# Patient Record
Sex: FEMALE | Race: WHITE | Employment: OTHER | URBAN - METROPOLITAN AREA
[De-identification: names, ages, dates, MRNs, and addresses within clinical notes are randomized per-mention and may not be internally consistent; named-entity substitution may affect disease eponyms.]

---

## 2021-05-28 ENCOUNTER — APPOINTMENT (OUTPATIENT)
Dept: CT IMAGING | Age: 66
DRG: 481 | End: 2021-05-28
Attending: PHYSICIAN ASSISTANT
Payer: MEDICARE

## 2021-05-28 ENCOUNTER — APPOINTMENT (OUTPATIENT)
Dept: GENERAL RADIOLOGY | Age: 66
DRG: 481 | End: 2021-05-28
Attending: PHYSICIAN ASSISTANT
Payer: MEDICARE

## 2021-05-28 ENCOUNTER — APPOINTMENT (OUTPATIENT)
Dept: ULTRASOUND IMAGING | Age: 66
DRG: 481 | End: 2021-05-28
Attending: INTERNAL MEDICINE
Payer: MEDICARE

## 2021-05-28 ENCOUNTER — APPOINTMENT (OUTPATIENT)
Dept: GENERAL RADIOLOGY | Age: 66
DRG: 481 | End: 2021-05-28
Attending: EMERGENCY MEDICINE
Payer: MEDICARE

## 2021-05-28 ENCOUNTER — HOSPITAL ENCOUNTER (INPATIENT)
Age: 66
LOS: 8 days | Discharge: HOME HEALTH | DRG: 481 | End: 2021-06-05
Attending: EMERGENCY MEDICINE | Admitting: INTERNAL MEDICINE
Payer: MEDICARE

## 2021-05-28 DIAGNOSIS — I48.0 PAF (PAROXYSMAL ATRIAL FIBRILLATION) (HCC): ICD-10-CM

## 2021-05-28 DIAGNOSIS — I10 ESSENTIAL HYPERTENSION: ICD-10-CM

## 2021-05-28 DIAGNOSIS — S42.201A CLOSED FRACTURE OF PROXIMAL END OF RIGHT HUMERUS, UNSPECIFIED FRACTURE MORPHOLOGY, INITIAL ENCOUNTER: ICD-10-CM

## 2021-05-28 DIAGNOSIS — S72.409S CLOSED FRACTURE OF DISTAL END OF FEMUR, UNSPECIFIED FRACTURE MORPHOLOGY, SEQUELA: Primary | ICD-10-CM

## 2021-05-28 PROBLEM — G40.909 SEIZURE DISORDER (HCC): Status: ACTIVE | Noted: 2021-05-28

## 2021-05-28 PROBLEM — R79.89 ELEVATED LFTS: Status: ACTIVE | Noted: 2021-05-28

## 2021-05-28 PROBLEM — S42.309A HUMERUS FRACTURE: Status: ACTIVE | Noted: 2021-05-28

## 2021-05-28 PROBLEM — E78.5 HLD (HYPERLIPIDEMIA): Status: ACTIVE | Noted: 2021-05-28

## 2021-05-28 LAB
ALBUMIN SERPL-MCNC: 3.6 G/DL (ref 3.5–5)
ALBUMIN/GLOB SERPL: 0.9 {RATIO} (ref 1.1–2.2)
ALP SERPL-CCNC: 284 U/L (ref 45–117)
ALT SERPL-CCNC: 88 U/L (ref 12–78)
ANION GAP SERPL CALC-SCNC: 5 MMOL/L (ref 5–15)
AST SERPL-CCNC: 70 U/L (ref 15–37)
BASOPHILS # BLD: 0.1 K/UL (ref 0–0.1)
BASOPHILS NFR BLD: 1 % (ref 0–1)
BILIRUB SERPL-MCNC: 0.3 MG/DL (ref 0.2–1)
BUN SERPL-MCNC: 9 MG/DL (ref 6–20)
BUN/CREAT SERPL: 12 (ref 12–20)
CALCIUM SERPL-MCNC: 8.6 MG/DL (ref 8.5–10.1)
CHLORIDE SERPL-SCNC: 100 MMOL/L (ref 97–108)
CO2 SERPL-SCNC: 28 MMOL/L (ref 21–32)
CREAT SERPL-MCNC: 0.75 MG/DL (ref 0.55–1.02)
DIFFERENTIAL METHOD BLD: ABNORMAL
EOSINOPHIL # BLD: 0.1 K/UL (ref 0–0.4)
EOSINOPHIL NFR BLD: 1 % (ref 0–7)
ERYTHROCYTE [DISTWIDTH] IN BLOOD BY AUTOMATED COUNT: 12 % (ref 11.5–14.5)
GLOBULIN SER CALC-MCNC: 3.9 G/DL (ref 2–4)
GLUCOSE SERPL-MCNC: 140 MG/DL (ref 65–100)
HCT VFR BLD AUTO: 32 % (ref 35–47)
HGB BLD-MCNC: 10.5 G/DL (ref 11.5–16)
IMM GRANULOCYTES # BLD AUTO: 0.1 K/UL (ref 0–0.04)
IMM GRANULOCYTES NFR BLD AUTO: 1 % (ref 0–0.5)
LYMPHOCYTES # BLD: 1 K/UL (ref 0.8–3.5)
LYMPHOCYTES NFR BLD: 10 % (ref 12–49)
MCH RBC QN AUTO: 31.7 PG (ref 26–34)
MCHC RBC AUTO-ENTMCNC: 32.8 G/DL (ref 30–36.5)
MCV RBC AUTO: 96.7 FL (ref 80–99)
MONOCYTES # BLD: 0.4 K/UL (ref 0–1)
MONOCYTES NFR BLD: 4 % (ref 5–13)
NEUTS SEG # BLD: 9 K/UL (ref 1.8–8)
NEUTS SEG NFR BLD: 83 % (ref 32–75)
NRBC # BLD: 0 K/UL (ref 0–0.01)
NRBC BLD-RTO: 0 PER 100 WBC
PLATELET # BLD AUTO: 289 K/UL (ref 150–400)
PMV BLD AUTO: 9.5 FL (ref 8.9–12.9)
POTASSIUM SERPL-SCNC: 3.2 MMOL/L (ref 3.5–5.1)
PROT SERPL-MCNC: 7.5 G/DL (ref 6.4–8.2)
RBC # BLD AUTO: 3.31 M/UL (ref 3.8–5.2)
SODIUM SERPL-SCNC: 133 MMOL/L (ref 136–145)
WBC # BLD AUTO: 10.7 K/UL (ref 3.6–11)

## 2021-05-28 PROCEDURE — 73060 X-RAY EXAM OF HUMERUS: CPT

## 2021-05-28 PROCEDURE — 76705 ECHO EXAM OF ABDOMEN: CPT

## 2021-05-28 PROCEDURE — 99285 EMERGENCY DEPT VISIT HI MDM: CPT

## 2021-05-28 PROCEDURE — 73706 CT ANGIO LWR EXTR W/O&W/DYE: CPT

## 2021-05-28 PROCEDURE — 80053 COMPREHEN METABOLIC PANEL: CPT

## 2021-05-28 PROCEDURE — 96374 THER/PROPH/DIAG INJ IV PUSH: CPT

## 2021-05-28 PROCEDURE — 80074 ACUTE HEPATITIS PANEL: CPT

## 2021-05-28 PROCEDURE — 74011250636 HC RX REV CODE- 250/636: Performed by: INTERNAL MEDICINE

## 2021-05-28 PROCEDURE — 85025 COMPLETE CBC W/AUTO DIFF WBC: CPT

## 2021-05-28 PROCEDURE — 73552 X-RAY EXAM OF FEMUR 2/>: CPT

## 2021-05-28 PROCEDURE — 74011250636 HC RX REV CODE- 250/636: Performed by: EMERGENCY MEDICINE

## 2021-05-28 PROCEDURE — 87635 SARS-COV-2 COVID-19 AMP PRB: CPT

## 2021-05-28 PROCEDURE — 86923 COMPATIBILITY TEST ELECTRIC: CPT

## 2021-05-28 PROCEDURE — 73562 X-RAY EXAM OF KNEE 3: CPT

## 2021-05-28 PROCEDURE — 73030 X-RAY EXAM OF SHOULDER: CPT

## 2021-05-28 PROCEDURE — 74011000636 HC RX REV CODE- 636: Performed by: EMERGENCY MEDICINE

## 2021-05-28 PROCEDURE — P9045 ALBUMIN (HUMAN), 5%, 250 ML: HCPCS

## 2021-05-28 PROCEDURE — 74011250637 HC RX REV CODE- 250/637: Performed by: INTERNAL MEDICINE

## 2021-05-28 PROCEDURE — 74011000250 HC RX REV CODE- 250: Performed by: INTERNAL MEDICINE

## 2021-05-28 PROCEDURE — 86901 BLOOD TYPING SEROLOGIC RH(D): CPT

## 2021-05-28 PROCEDURE — 71045 X-RAY EXAM CHEST 1 VIEW: CPT

## 2021-05-28 PROCEDURE — 72170 X-RAY EXAM OF PELVIS: CPT

## 2021-05-28 PROCEDURE — 93005 ELECTROCARDIOGRAM TRACING: CPT

## 2021-05-28 PROCEDURE — 94640 AIRWAY INHALATION TREATMENT: CPT

## 2021-05-28 PROCEDURE — 74011250636 HC RX REV CODE- 250/636: Performed by: PHYSICIAN ASSISTANT

## 2021-05-28 PROCEDURE — 65270000029 HC RM PRIVATE

## 2021-05-28 PROCEDURE — 36415 COLL VENOUS BLD VENIPUNCTURE: CPT

## 2021-05-28 PROCEDURE — 74011250636 HC RX REV CODE- 250/636

## 2021-05-28 RX ORDER — HEPARIN SODIUM 5000 [USP'U]/ML
5000 INJECTION, SOLUTION INTRAVENOUS; SUBCUTANEOUS ONCE
Status: COMPLETED | OUTPATIENT
Start: 2021-05-28 | End: 2021-05-28

## 2021-05-28 RX ORDER — DICLOFENAC SODIUM 75 MG/1
75 TABLET, DELAYED RELEASE ORAL 2 TIMES DAILY
COMMUNITY
End: 2021-06-05

## 2021-05-28 RX ORDER — DEXTROMETHORPHAN HYDROBROMIDE, GUAIFENESIN 5; 100 MG/5ML; MG/5ML
1300 LIQUID ORAL 2 TIMES DAILY
COMMUNITY
End: 2021-11-15

## 2021-05-28 RX ORDER — AMLODIPINE BESYLATE 5 MG/1
5 TABLET ORAL EVERY EVENING
Status: DISCONTINUED | OUTPATIENT
Start: 2021-05-28 | End: 2021-06-01

## 2021-05-28 RX ORDER — SODIUM CHLORIDE 0.9 % (FLUSH) 0.9 %
5-40 SYRINGE (ML) INJECTION EVERY 8 HOURS
Status: DISCONTINUED | OUTPATIENT
Start: 2021-05-28 | End: 2021-06-05 | Stop reason: HOSPADM

## 2021-05-28 RX ORDER — OXYCODONE HYDROCHLORIDE 5 MG/1
5 TABLET ORAL
Status: DISCONTINUED | OUTPATIENT
Start: 2021-05-28 | End: 2021-05-31 | Stop reason: SDUPTHER

## 2021-05-28 RX ORDER — ALBUTEROL SULFATE 90 UG/1
2 AEROSOL, METERED RESPIRATORY (INHALATION)
COMMUNITY

## 2021-05-28 RX ORDER — PROMETHAZINE HYDROCHLORIDE 25 MG/1
12.5 TABLET ORAL
Status: DISCONTINUED | OUTPATIENT
Start: 2021-05-28 | End: 2021-06-05 | Stop reason: HOSPADM

## 2021-05-28 RX ORDER — LISINOPRIL AND HYDROCHLOROTHIAZIDE 12.5; 2 MG/1; MG/1
2 TABLET ORAL EVERY EVENING
COMMUNITY
End: 2021-06-05

## 2021-05-28 RX ORDER — PHENOBARBITAL 32.4 MG/1
129.6 TABLET ORAL
Status: DISCONTINUED | OUTPATIENT
Start: 2021-05-28 | End: 2021-06-05 | Stop reason: HOSPADM

## 2021-05-28 RX ORDER — POTASSIUM CHLORIDE 750 MG/1
40 TABLET, FILM COATED, EXTENDED RELEASE ORAL
Status: ACTIVE | OUTPATIENT
Start: 2021-05-28 | End: 2021-05-29

## 2021-05-28 RX ORDER — HYDROMORPHONE HYDROCHLORIDE 1 MG/ML
1 INJECTION, SOLUTION INTRAMUSCULAR; INTRAVENOUS; SUBCUTANEOUS
Status: DISCONTINUED | OUTPATIENT
Start: 2021-05-28 | End: 2021-05-31

## 2021-05-28 RX ORDER — SODIUM CHLORIDE AND POTASSIUM CHLORIDE .9; .15 G/100ML; G/100ML
SOLUTION INTRAVENOUS CONTINUOUS
Status: DISCONTINUED | OUTPATIENT
Start: 2021-05-28 | End: 2021-05-31

## 2021-05-28 RX ORDER — FLUTICASONE PROPIONATE AND SALMETEROL 250; 50 UG/1; UG/1
1 POWDER RESPIRATORY (INHALATION) EVERY 12 HOURS
COMMUNITY

## 2021-05-28 RX ORDER — POLYETHYLENE GLYCOL 3350 17 G/17G
17 POWDER, FOR SOLUTION ORAL DAILY PRN
Status: DISCONTINUED | OUTPATIENT
Start: 2021-05-28 | End: 2021-06-05 | Stop reason: HOSPADM

## 2021-05-28 RX ORDER — ENOXAPARIN SODIUM 100 MG/ML
40 INJECTION SUBCUTANEOUS DAILY
Status: DISCONTINUED | OUTPATIENT
Start: 2021-05-30 | End: 2021-06-05 | Stop reason: HOSPADM

## 2021-05-28 RX ORDER — ALBUTEROL SULFATE 0.83 MG/ML
2.5 SOLUTION RESPIRATORY (INHALATION)
Status: DISCONTINUED | OUTPATIENT
Start: 2021-05-28 | End: 2021-06-05 | Stop reason: HOSPADM

## 2021-05-28 RX ORDER — MORPHINE SULFATE 2 MG/ML
2 INJECTION, SOLUTION INTRAMUSCULAR; INTRAVENOUS
Status: COMPLETED | OUTPATIENT
Start: 2021-05-28 | End: 2021-05-28

## 2021-05-28 RX ORDER — AMLODIPINE BESYLATE 5 MG/1
5 TABLET ORAL EVERY EVENING
COMMUNITY
End: 2021-06-05

## 2021-05-28 RX ORDER — SODIUM CHLORIDE 0.9 % (FLUSH) 0.9 %
5-40 SYRINGE (ML) INJECTION AS NEEDED
Status: DISCONTINUED | OUTPATIENT
Start: 2021-05-28 | End: 2021-06-05 | Stop reason: HOSPADM

## 2021-05-28 RX ORDER — PHENOBARBITAL 32.4 MG/1
129.6 TABLET ORAL
COMMUNITY

## 2021-05-28 RX ORDER — ACETAMINOPHEN 650 MG/1
650 SUPPOSITORY RECTAL
Status: DISCONTINUED | OUTPATIENT
Start: 2021-05-28 | End: 2021-06-05 | Stop reason: HOSPADM

## 2021-05-28 RX ORDER — LISINOPRIL 20 MG/1
40 TABLET ORAL EVERY EVENING
Status: DISCONTINUED | OUTPATIENT
Start: 2021-05-28 | End: 2021-06-02

## 2021-05-28 RX ORDER — ACETAMINOPHEN 325 MG/1
650 TABLET ORAL
Status: DISCONTINUED | OUTPATIENT
Start: 2021-05-28 | End: 2021-06-05 | Stop reason: HOSPADM

## 2021-05-28 RX ORDER — ONDANSETRON 2 MG/ML
4 INJECTION INTRAMUSCULAR; INTRAVENOUS
Status: DISCONTINUED | OUTPATIENT
Start: 2021-05-28 | End: 2021-06-05 | Stop reason: HOSPADM

## 2021-05-28 RX ADMIN — PHENOBARBITAL 129.6 MG: 32.4 TABLET ORAL at 22:25

## 2021-05-28 RX ADMIN — POTASSIUM CHLORIDE AND SODIUM CHLORIDE: 900; 150 INJECTION, SOLUTION INTRAVENOUS at 20:56

## 2021-05-28 RX ADMIN — OXYCODONE 5 MG: 5 TABLET ORAL at 22:28

## 2021-05-28 RX ADMIN — MORPHINE SULFATE 2 MG: 2 INJECTION, SOLUTION INTRAMUSCULAR; INTRAVENOUS at 13:53

## 2021-05-28 RX ADMIN — ARFORMOTEROL TARTRATE: 15 SOLUTION RESPIRATORY (INHALATION) at 21:19

## 2021-05-28 RX ADMIN — HEPARIN SODIUM 5000 UNITS: 5000 INJECTION INTRAVENOUS; SUBCUTANEOUS at 18:20

## 2021-05-28 RX ADMIN — IOPAMIDOL 100 ML: 755 INJECTION, SOLUTION INTRAVENOUS at 16:11

## 2021-05-28 RX ADMIN — Medication 10 ML: at 22:25

## 2021-05-28 NOTE — ED PROVIDER NOTES
The history is provided by the patient. Fall  The accident occurred less than 1 hour ago. The fall occurred while walking. She fell from a height of ground level. She landed on dirt. There was no blood loss. The point of impact was the right shoulder. The pain is present in the right shoulder. The pain is moderate. She was not ambulatory at the scene. There was no entrapment after the fall. There was no drug use involved in the accident. There was no alcohol use involved in the accident. Pertinent negatives include no visual change, no fever, no numbness, no abdominal pain, no bowel incontinence, no nausea, no vomiting, no hematuria, no headaches, no extremity weakness, no hearing loss, no loss of consciousness, no tingling and no laceration. The risk factors include being elderly. The symptoms are aggravated by activity, use of injured limb, ambulation and pressure on injury. She has tried nothing for the symptoms. The treatment provided no relief. It is unknown when the patient last had a tetanus shot. No past medical history on file. No past surgical history on file. No family history on file. Social History     Socioeconomic History    Marital status: Not on file     Spouse name: Not on file    Number of children: Not on file    Years of education: Not on file    Highest education level: Not on file   Occupational History    Not on file   Tobacco Use    Smoking status: Not on file   Substance and Sexual Activity    Alcohol use: Not on file    Drug use: Not on file    Sexual activity: Not on file   Other Topics Concern    Not on file   Social History Narrative    Not on file     Social Determinants of Health     Financial Resource Strain:     Difficulty of Paying Living Expenses:    Food Insecurity:     Worried About Running Out of Food in the Last Year:     [address] in the Last Year:    Transportation Needs:     Lack of Transportation (Medical):      Lack of Transportation (Non-Medical):    Physical Activity:     Days of Exercise per Week:     Minutes of Exercise per Session:    Stress:     Feeling of Stress :    Social Connections:     Frequency of Communication with Friends and Family:     Frequency of Social Gatherings with Friends and Family:     Attends Sikh Services:     Active Member of Clubs or Organizations:     Attends Club or Organization Meetings:     Marital Status:    Intimate Partner Violence:     Fear of Current or Ex-Partner:     Emotionally Abused:     Physically Abused:     Sexually Abused: ALLERGIES: Seasonale [levonorgestrel-ethinyl estrad]    Review of Systems   Constitutional: Negative for activity change, chills and fever. HENT: Negative for nosebleeds, sore throat, trouble swallowing and voice change. Eyes: Negative for visual disturbance. Respiratory: Negative for shortness of breath. Cardiovascular: Negative for chest pain and palpitations. Gastrointestinal: Negative for abdominal pain, bowel incontinence, constipation, diarrhea, nausea and vomiting. Genitourinary: Negative for difficulty urinating, dysuria, hematuria and urgency. Musculoskeletal: Positive for arthralgias. Negative for back pain, extremity weakness, neck pain and neck stiffness. Skin: Negative for color change. Allergic/Immunologic: Negative for immunocompromised state. Neurological: Negative for dizziness, tingling, seizures, loss of consciousness, syncope, weakness, light-headedness, numbness and headaches. Psychiatric/Behavioral: Negative for behavioral problems, confusion, hallucinations, self-injury and suicidal ideas. Vitals:    05/28/21 1312   BP: (!) 175/52   Pulse: 70   Resp: 16   Temp: 98.1 °F (36.7 °C)   SpO2: 98%   Weight: 90.7 kg (200 lb)   Height: 5' 3\" (1.6 m)            Physical Exam  Vitals and nursing note reviewed. Constitutional:       General: She is not in acute distress.      Appearance: She is well-developed. She is not diaphoretic. HENT:      Head: Normocephalic and atraumatic. Eyes:      Pupils: Pupils are equal, round, and reactive to light. Cardiovascular:      Rate and Rhythm: Normal rate and regular rhythm. Heart sounds: Normal heart sounds. No murmur heard. No friction rub. No gallop. Pulmonary:      Effort: Pulmonary effort is normal. No respiratory distress. Breath sounds: Normal breath sounds. No wheezing. Abdominal:      General: Bowel sounds are normal. There is no distension. Palpations: Abdomen is soft. Tenderness: There is no abdominal tenderness. There is no guarding or rebound. Musculoskeletal:      Right shoulder: Bony tenderness present. Decreased range of motion. Right upper arm: Tenderness present. Right elbow: Normal.      Cervical back: Normal range of motion and neck supple. Right upper leg: Bony tenderness present. Right knee: Decreased range of motion. Skin:     General: Skin is warm. Findings: No laceration or rash. Neurological:      Mental Status: She is alert and oriented to person, place, and time. Psychiatric:         Behavior: Behavior normal.         Thought Content: Thought content normal.         Judgment: Judgment normal.          MDM     This is a 58-year-old female with past medical history, review of systems, physical exam as above, presenting with complaints of right arm pain, right leg pain following a ground-level fall. Patient states she was stepped outside onto an uneven sidewalk causing her to roll her right ankle and fall to her right side. She endorses immediate onset of right thigh pain, right arm pain. She endorse striking her nose but denies loss of consciousness or bleeding. She denies nose pain.   Physical exam remarkable for a well-appearing elderly female, in no acute distress with decreased range of motion and tenderness involving the right shoulder and right humerus, distal pulse motor and sensation intact, right elbow and right wrist without apparent pathology. Tenderness and decreased range of motion of the right upper leg, with normal tib-fib and nontender right ankle with free range of motion. Differential includes contusion versus fracture. Concerning for humerus fracture versus femur fracture following fall. Plan to provide pain control, and obtain plain films, preadmission lab work. We will reassess, and make a disposition. Procedures    SIGN OUT:  3:07 PM  Discussed pt's hx, disposition, and available diagnostic and imaging results with Dr. Bhanu Max. Reviewed care plans. Both providers and patient are in agreement with care plan. Dr. Delvis West is transferring care of the pt to Dr. Bhanu Max at this time.

## 2021-05-28 NOTE — PROGRESS NOTES
[facility]: MED RECONCILIATION    Information obtained from: patient, Rx query    Significant PMH/Disease States:   Past Medical History:   Diagnosis Date    Hyperlipidemia     Hypertension     Seizures ([address]      Chief Complaint for this Admission:   Chief Complaint   Patient presents with    Fall     Allergies: Seasonale [levonorgestrel-ethinyl estrad]    Prior to Admission Medications:     Medication Documentation Review Audit       Reviewed by MARLINE CamachoD (Pharmacist) on 05/28/21 at 1821      Medication Sig Documenting Provider Last Dose Status Taking?   acetaminophen (Tylenol Arthritis Pain) 650 mg TbER Take 1,300 mg by mouth two (2) times a day. Takes with diclofenac Provider, Historical 5/28/2021 am Active Yes   albuterol (PROVENTIL HFA, VENTOLIN HFA, PROAIR HFA) 90 mcg/actuation inhaler Take 2 Puffs by inhalation every four (4) hours as needed for Wheezing. Provider, Historical 5/28/2021 am Active Yes   amLODIPine (NORVASC) 5 mg tablet Take 5 mg by mouth every evening. Provider, Historical 5/27/2021 pm Active Yes   diclofenac EC (VOLTAREN) 75 mg EC tablet Take 75 mg by mouth two (2) times a day. Takes with Tylenol Arthritis Provider, Historical 5/28/2021 am Active Yes   fluticasone propion-salmeteroL (Wixela Inhub) 250-50 mcg/dose diskus inhaler Take 1 Puff by inhalation every twelve (12) hours. Provider, Historical 5/28/2021 am Active Yes   lisinopril-hydroCHLOROthiazide (PRINZIDE, ZESTORETIC) 20-12.5 mg per tablet Take 2 Tablets by mouth every evening. Provider, Historical 5/27/2021 pm Active Yes   PHENobarbitaL (LUMINAL) 32.4 mg tablet Take 129.6 mg by mouth nightly. 129.6 mg = 4 tablets Provider, Historical 5/27/2021 pm Active Yes                  Thank you,  Juaquin BHAKTA [facility]

## 2021-05-28 NOTE — ED NOTES
3:30 PM  I received signout from Dr. Jennifer Church for Ms. Grande. Pt. With a humerus fx and femur fx. I re-examined her. 2+ DP pulse on the right. Normal sensation. Cap refill less than 2 seconds. Will order CT, per ortho recs. Pt.  To be evaluated for admission by the hospitalist

## 2021-05-28 NOTE — ED TRIAGE NOTES
Pt to ED via ems from home for GLF onto grass. Pt reports mis stepping and fell to the ground, denies LOC, does not take blood thinners  Reports right upper arm pain and right knee pain.  +PMS

## 2021-05-28 NOTE — ED NOTES
TRANSFER - OUT REPORT:    Verbal report given to [facility] RN(name) on Krishna Oneal  being transferred to 413(unit) for routine progression of care       Report consisted of patients Situation, Background, Assessment and   Recommendations(SBAR). Information from the following report(s) SBAR, Kardex, ED Summary and MAR was reviewed with the receiving nurse. Lines:   Peripheral IV 05/28/21 Left Antecubital (Active)        Opportunity for questions and clarification was provided.       Patient transported with:   Monitor  Registered Nurse

## 2021-05-28 NOTE — ED NOTES
Perfect Serve Consult for Admission  3:33 PM    ED Room Number: ER07/07  Patient Name and age:  Michaela Morataya 76 y.o.  female  Working Diagnosis:   1. Closed fracture of distal end of femur, unspecified fracture morphology, sequela    2. Closed fracture of proximal end of right humerus, unspecified fracture morphology, initial encounter        COVID-19 Suspicion:  no  Sepsis present:  no  Reassessment needed: no  Readmission: no  Isolation Requirements:  no  Recommended Level of Care:  med/surg  Department:[facility] - [phone]  Other:  Ortho to see.

## 2021-05-28 NOTE — H&P
[address]  [address] [facility], [address] [place] [address]  [phone]    Admission History and Physical      NAME:  Malika Grimes   :   1955   MRN:  [id number]     PCP:  Caroline Luna MD     Date/Time of service:  2021  6:24 PM        Subjective:     CHIEF COMPLAINT: Fall    HISTORY OF PRESENT ILLNESS:     Ms. Martin Aldridge is a 76 y.o.  female the past medical history of hypertension, hyperlipidemia and seizure disorder who is admitted with a humeral and femoral fracture. Ms. Martin Aldridge states that she was walking today on level ground when she fell. She denies any prior chest pain or syncopal episode; she states she recollects the entire episode. She states she landed on her right side hitting her right shoulder and lower extremity. Allergies   Allergen Reactions    Seasonale [Levonorgestrel-Ethinyl Estrad] Runny Nose       Prior to Admission medications    Medication Sig Start Date End Date Taking? Authorizing Provider   albuterol (PROVENTIL HFA, VENTOLIN HFA, PROAIR HFA) 90 mcg/actuation inhaler Take 2 Puffs by inhalation every four (4) hours as needed for Wheezing. Yes Provider, Historical   amLODIPine (NORVASC) 5 mg tablet Take 5 mg by mouth every evening. Yes Provider, Historical   diclofenac EC (VOLTAREN) 75 mg EC tablet Take 75 mg by mouth two (2) times a day. Takes with Tylenol Arthritis   Yes Provider, Historical   fluticasone propion-salmeteroL (Wixela Inhub) 250-50 mcg/dose diskus inhaler Take 1 Puff by inhalation every twelve (12) hours. Yes Provider, Historical   lisinopril-hydroCHLOROthiazide (PRINZIDE, ZESTORETIC) 20-12.5 mg per tablet Take 2 Tablets by mouth every evening. Yes Provider, Historical   PHENobarbitaL (LUMINAL) 32.4 mg tablet Take 129.6 mg by mouth nightly. 129.6 mg = 4 tablets   Yes Provider, Historical   acetaminophen (Tylenol Arthritis Pain) 650 mg TbER Take 1,300 mg by mouth two (2) times a day.  Takes with diclofenac   Yes Provider, Historical       Past Medical History:   Diagnosis Date    Hyperlipidemia     Hypertension     Seizures ([address]         Past Surgical History:   Procedure Laterality Date    HX GYN      HX ORTHOPAEDIC         Social History     Tobacco Use    Smoking status: Never Smoker   Substance Use Topics    Alcohol use: Never        Family History   Problem Relation Age of Onset    Heart Disease Mother     Heart Disease Father         Review of Systems:  (bold if positive, if negative)    Gen:  Eyes:  ENT:  CVS:  Pulm:  GI:  :  MS: Pain of right upper and lower extremitySkin:  Psych:  Endo:  Hem:  Renal:  Neuro:            Objective:      VITALS:    Vital signs reviewed; most recent are:    Visit Vitals  BP (!) 136/39   Pulse 70   Temp 98.1 °F (36.7 °C)   Resp 16   Ht 5' 3\" (1.6 m)   Wt 90.7 kg (200 lb)   SpO2 97%   BMI 35.43 kg/m²     SpO2 Readings from Last 6 Encounters:   05/28/21 97%        No intake or output data in the 24 hours ending 05/28/21 1824     Exam:     Physical Exam:    Gen:  Well-developed, well-nourished, in no acute distress  HEENT:  Pink conjunctivae, PERRL, hearing intact to voice  Resp:  No accessory muscle use, clear breath sounds without wheezes rales or rhonchi  Card:  RRR, No murmurs, normal S1, S2, bilateral peripheral edema  Abd:  Soft, non-tender, non-distended, normoactive bowel sounds are present, no palpable organomegaly   Lymph:  No cervical adenopathy  Musc:  No cyanosis or clubbing  Skin:  No rashes or ulcers, skin turgor is good  Neuro:  Cranial nerves 3-12 are grossly intact, limited range of motion of right upper lower extremity, follows commands appropriately  Psych:  Oriented to person, place, and time, Alert with good insight      Labs:    Recent Labs     05/28/21  1354   WBC 10.7   HGB 10.5*   HCT 32.0*        Recent Labs     05/28/21  1354   *   K 3.2*      CO2 28   *   BUN 9   CREA 0.75   CA 8.6   ALB 3.6   TBILI 0.3   ALT 88*     No results found for: GLUCPOC  No results for input(s): PH, PCO2, PO2, HCO3, FIO2 in the last 72 hours. No results for input(s): INR, INREXT in the last 72 hours. Radiology and EKG reviewed:   CT with right femoral fracture   Old Records reviewed in [facility]       Assessment/Plan:           Right Humerus fracture (5/28/2021)/ right femoral fracture: Orthopedics consult. Acute pain of right humerus and femur:Oxycodone and IV Dilaudid for pain as needed. Elevated LFTs: obtain ABD US and acute hepatitis panel. Monitor     Hypokalemia: Replete as needed. Hold home HCTZ. Seizure disorder ([address] (5/28/2021): Continue home phenobarbital.      HTN (hypertension) (5/28/2021): Continue home amlodipine. Continue home losartan. HLD (hyperlipidemia) (5/28/2021): Not on home statin.          Risk of deterioration: high      Total time spent with patient: 79 Minutes **I personally saw and examined the patient during this time period**                 Care Plan discussed with: Patient, Family and Consultant/Specialist    Discussed:  Care Plan    Prophylaxis:  Hep SQ    Probable Disposition:   PT, OT, RN VERSUS REHAB           ___________________________________________________    Attending Physician: Yamileth Mcgee DO

## 2021-05-29 ENCOUNTER — APPOINTMENT (OUTPATIENT)
Dept: GENERAL RADIOLOGY | Age: 66
DRG: 481 | End: 2021-05-29
Attending: INTERNAL MEDICINE
Payer: MEDICARE

## 2021-05-29 ENCOUNTER — ANESTHESIA (OUTPATIENT)
Dept: SURGERY | Age: 66
DRG: 481 | End: 2021-05-29
Payer: MEDICARE

## 2021-05-29 ENCOUNTER — ANESTHESIA EVENT (OUTPATIENT)
Dept: SURGERY | Age: 66
DRG: 481 | End: 2021-05-29
Payer: MEDICARE

## 2021-05-29 LAB
ALBUMIN SERPL-MCNC: 3.4 G/DL (ref 3.5–5)
ALBUMIN/GLOB SERPL: 1 {RATIO} (ref 1.1–2.2)
ALP SERPL-CCNC: 260 U/L (ref 45–117)
ALT SERPL-CCNC: 68 U/L (ref 12–78)
ANION GAP SERPL CALC-SCNC: 7 MMOL/L (ref 5–15)
AST SERPL-CCNC: 36 U/L (ref 15–37)
ATRIAL RATE: 76 BPM
BASOPHILS # BLD: 0 K/UL (ref 0–0.1)
BASOPHILS NFR BLD: 0 % (ref 0–1)
BILIRUB SERPL-MCNC: 0.4 MG/DL (ref 0.2–1)
BUN SERPL-MCNC: 10 MG/DL (ref 6–20)
BUN/CREAT SERPL: 17 (ref 12–20)
CALCIUM SERPL-MCNC: 8.5 MG/DL (ref 8.5–10.1)
CALCULATED P AXIS, ECG09: 48 DEGREES
CALCULATED R AXIS, ECG10: 22 DEGREES
CALCULATED T AXIS, ECG11: 66 DEGREES
CHLORIDE SERPL-SCNC: 102 MMOL/L (ref 97–108)
CO2 SERPL-SCNC: 25 MMOL/L (ref 21–32)
COVID-19 RAPID TEST, COVR: NOT DETECTED
CREAT SERPL-MCNC: 0.59 MG/DL (ref 0.55–1.02)
DIAGNOSIS, 93000: NORMAL
DIFFERENTIAL METHOD BLD: ABNORMAL
EOSINOPHIL # BLD: 0 K/UL (ref 0–0.4)
EOSINOPHIL NFR BLD: 0 % (ref 0–7)
ERYTHROCYTE [DISTWIDTH] IN BLOOD BY AUTOMATED COUNT: 12 % (ref 11.5–14.5)
GLOBULIN SER CALC-MCNC: 3.5 G/DL (ref 2–4)
GLUCOSE SERPL-MCNC: 132 MG/DL (ref 65–100)
HAV IGM SER QL: NONREACTIVE
HBV CORE IGM SER QL: NONREACTIVE
HBV SURFACE AG SER QL: <0.1 INDEX
HBV SURFACE AG SER QL: NEGATIVE
HCT VFR BLD AUTO: 28 % (ref 35–47)
HCV AB SERPL QL IA: NONREACTIVE
HCV COMMENT,HCGAC: NORMAL
HGB BLD-MCNC: 9.2 G/DL (ref 11.5–16)
IMM GRANULOCYTES # BLD AUTO: 0 K/UL (ref 0–0.04)
IMM GRANULOCYTES NFR BLD AUTO: 0 % (ref 0–0.5)
LYMPHOCYTES # BLD: 0.9 K/UL (ref 0.8–3.5)
LYMPHOCYTES NFR BLD: 9 % (ref 12–49)
MAGNESIUM SERPL-MCNC: 2.1 MG/DL (ref 1.6–2.4)
MCH RBC QN AUTO: 31.4 PG (ref 26–34)
MCHC RBC AUTO-ENTMCNC: 32.9 G/DL (ref 30–36.5)
MCV RBC AUTO: 95.6 FL (ref 80–99)
MONOCYTES # BLD: 0.5 K/UL (ref 0–1)
MONOCYTES NFR BLD: 5 % (ref 5–13)
NEUTS SEG # BLD: 8.7 K/UL (ref 1.8–8)
NEUTS SEG NFR BLD: 86 % (ref 32–75)
NRBC # BLD: 0 K/UL (ref 0–0.01)
NRBC BLD-RTO: 0 PER 100 WBC
P-R INTERVAL, ECG05: 156 MS
PLATELET # BLD AUTO: 297 K/UL (ref 150–400)
PMV BLD AUTO: 9.9 FL (ref 8.9–12.9)
POTASSIUM SERPL-SCNC: 3.7 MMOL/L (ref 3.5–5.1)
PROT SERPL-MCNC: 6.9 G/DL (ref 6.4–8.2)
Q-T INTERVAL, ECG07: 434 MS
QRS DURATION, ECG06: 82 MS
QTC CALCULATION (BEZET), ECG08: 488 MS
RBC # BLD AUTO: 2.93 M/UL (ref 3.8–5.2)
SODIUM SERPL-SCNC: 134 MMOL/L (ref 136–145)
SOURCE, COVRS: NORMAL
SP1: NORMAL
SP2: NORMAL
SP3: NORMAL
VENTRICULAR RATE, ECG03: 76 BPM
WBC # BLD AUTO: 10.1 K/UL (ref 3.6–11)

## 2021-05-29 PROCEDURE — 77030031139 HC SUT VCRL2 J&J -A: Performed by: ORTHOPAEDIC SURGERY

## 2021-05-29 PROCEDURE — 74011250636 HC RX REV CODE- 250/636: Performed by: ORTHOPAEDIC SURGERY

## 2021-05-29 PROCEDURE — 77030020061 HC IV BLD WRMR ADMIN SET 3M -B: Performed by: ANESTHESIOLOGY

## 2021-05-29 PROCEDURE — 77030003862 HC BIT DRL SYNT -B: Performed by: ORTHOPAEDIC SURGERY

## 2021-05-29 PROCEDURE — 76010000132 HC OR TIME 2.5 TO 3 HR: Performed by: ORTHOPAEDIC SURGERY

## 2021-05-29 PROCEDURE — 74011250636 HC RX REV CODE- 250/636: Performed by: NURSE ANESTHETIST, CERTIFIED REGISTERED

## 2021-05-29 PROCEDURE — 74011250636 HC RX REV CODE- 250/636: Performed by: ANESTHESIOLOGY

## 2021-05-29 PROCEDURE — C1713 ANCHOR/SCREW BN/BN,TIS/BN: HCPCS | Performed by: ORTHOPAEDIC SURGERY

## 2021-05-29 PROCEDURE — C1769 GUIDE WIRE: HCPCS | Performed by: ORTHOPAEDIC SURGERY

## 2021-05-29 PROCEDURE — 74011250637 HC RX REV CODE- 250/637: Performed by: INTERNAL MEDICINE

## 2021-05-29 PROCEDURE — 77030000032 HC CUF TRNQT ZIMM -B: Performed by: ORTHOPAEDIC SURGERY

## 2021-05-29 PROCEDURE — 77030002933 HC SUT MCRYL J&J -A: Performed by: ORTHOPAEDIC SURGERY

## 2021-05-29 PROCEDURE — 77030020788: Performed by: ORTHOPAEDIC SURGERY

## 2021-05-29 PROCEDURE — 74011000250 HC RX REV CODE- 250: Performed by: INTERNAL MEDICINE

## 2021-05-29 PROCEDURE — 2709999900 HC NON-CHARGEABLE SUPPLY: Performed by: ORTHOPAEDIC SURGERY

## 2021-05-29 PROCEDURE — 74011000258 HC RX REV CODE- 258: Performed by: NURSE ANESTHETIST, CERTIFIED REGISTERED

## 2021-05-29 PROCEDURE — 77030026352 HC BIT DRL QC PERC SYNT -C: Performed by: ORTHOPAEDIC SURGERY

## 2021-05-29 PROCEDURE — 85025 COMPLETE CBC W/AUTO DIFF WBC: CPT

## 2021-05-29 PROCEDURE — 77030013079 HC BLNKT BAIR HGGR 3M -A: Performed by: ANESTHESIOLOGY

## 2021-05-29 PROCEDURE — 74011250636 HC RX REV CODE- 250/636: Performed by: INTERNAL MEDICINE

## 2021-05-29 PROCEDURE — 3E0T3BZ INTRODUCTION OF ANESTHETIC AGENT INTO PERIPHERAL NERVES AND PLEXI, PERCUTANEOUS APPROACH: ICD-10-PCS | Performed by: ANESTHESIOLOGY

## 2021-05-29 PROCEDURE — 80053 COMPREHEN METABOLIC PANEL: CPT

## 2021-05-29 PROCEDURE — 94640 AIRWAY INHALATION TREATMENT: CPT

## 2021-05-29 PROCEDURE — 77030010507 HC ADH SKN DERMBND J&J -B: Performed by: ORTHOPAEDIC SURGERY

## 2021-05-29 PROCEDURE — 65270000029 HC RM PRIVATE

## 2021-05-29 PROCEDURE — 36415 COLL VENOUS BLD VENIPUNCTURE: CPT

## 2021-05-29 PROCEDURE — 83735 ASSAY OF MAGNESIUM: CPT

## 2021-05-29 PROCEDURE — 65660000000 HC RM CCU STEPDOWN

## 2021-05-29 PROCEDURE — 77030033067 HC SUT PDO STRATFX SPIR J&J -B: Performed by: ORTHOPAEDIC SURGERY

## 2021-05-29 PROCEDURE — 77030026438 HC STYL ET INTUB CARD -A: Performed by: ANESTHESIOLOGY

## 2021-05-29 PROCEDURE — 76210000006 HC OR PH I REC 0.5 TO 1 HR: Performed by: ORTHOPAEDIC SURGERY

## 2021-05-29 PROCEDURE — 0QSB04Z REPOSITION RIGHT LOWER FEMUR WITH INTERNAL FIXATION DEVICE, OPEN APPROACH: ICD-10-PCS | Performed by: PHYSICIAN ASSISTANT

## 2021-05-29 PROCEDURE — 77030008684 HC TU ET CUF COVD -B: Performed by: ANESTHESIOLOGY

## 2021-05-29 PROCEDURE — 74011000250 HC RX REV CODE- 250: Performed by: NURSE ANESTHETIST, CERTIFIED REGISTERED

## 2021-05-29 PROCEDURE — 76060000036 HC ANESTHESIA 2.5 TO 3 HR: Performed by: ORTHOPAEDIC SURGERY

## 2021-05-29 DEVICE — IMPLANTABLE DEVICE
Type: IMPLANTABLE DEVICE | Site: FEMUR | Status: NON-FUNCTIONAL
Removed: 2021-11-29

## 2021-05-29 DEVICE — SCREW BNE L32MM DIA4.5MM PROX CORT TIB S STL ST LOK FULL
Type: IMPLANTABLE DEVICE | Site: FEMUR | Status: NON-FUNCTIONAL
Removed: 2021-11-29

## 2021-05-29 DEVICE — SCREW BNE L55MM DIA5MM CNDYL S STL ST VAR ANG LOK FULL THRD
Type: IMPLANTABLE DEVICE | Site: FEMUR | Status: NON-FUNCTIONAL
Removed: 2021-11-29

## 2021-05-29 DEVICE — SCREW BNE L36MM DIA4.5MM PROX CORT TIB S STL ST LOK FULL
Type: IMPLANTABLE DEVICE | Site: FEMUR | Status: NON-FUNCTIONAL
Removed: 2021-11-29

## 2021-05-29 DEVICE — SCREW BNE L34MM DIA4.5MM PROX CORT TIB S STL ST LOK FULL
Type: IMPLANTABLE DEVICE | Site: FEMUR | Status: NON-FUNCTIONAL
Removed: 2021-11-29

## 2021-05-29 DEVICE — PLATE BNE L230MM 10 H NONSTERILE R CNDYL S STL CRV LOK
Type: IMPLANTABLE DEVICE | Site: FEMUR | Status: NON-FUNCTIONAL
Removed: 2021-11-29

## 2021-05-29 DEVICE — SCREW BNE L70MM DIA5MM CNDYL S STL ST VAR ANG LOK FULL THRD
Type: IMPLANTABLE DEVICE | Site: FEMUR | Status: NON-FUNCTIONAL
Removed: 2021-11-29

## 2021-05-29 RX ORDER — DIPHENHYDRAMINE HYDROCHLORIDE 50 MG/ML
12.5 INJECTION, SOLUTION INTRAMUSCULAR; INTRAVENOUS AS NEEDED
Status: DISCONTINUED | OUTPATIENT
Start: 2021-05-29 | End: 2021-05-29 | Stop reason: HOSPADM

## 2021-05-29 RX ORDER — SODIUM CHLORIDE, SODIUM LACTATE, POTASSIUM CHLORIDE, CALCIUM CHLORIDE 600; 310; 30; 20 MG/100ML; MG/100ML; MG/100ML; MG/100ML
125 INJECTION, SOLUTION INTRAVENOUS CONTINUOUS
Status: DISCONTINUED | OUTPATIENT
Start: 2021-05-29 | End: 2021-05-30

## 2021-05-29 RX ORDER — GLYCOPYRROLATE 0.2 MG/ML
INJECTION INTRAMUSCULAR; INTRAVENOUS AS NEEDED
Status: DISCONTINUED | OUTPATIENT
Start: 2021-05-29 | End: 2021-05-29 | Stop reason: HOSPADM

## 2021-05-29 RX ORDER — NEOSTIGMINE METHYLSULFATE 1 MG/ML
INJECTION, SOLUTION INTRAVENOUS AS NEEDED
Status: DISCONTINUED | OUTPATIENT
Start: 2021-05-29 | End: 2021-05-29 | Stop reason: HOSPADM

## 2021-05-29 RX ORDER — SODIUM CHLORIDE 9 MG/ML
125 INJECTION, SOLUTION INTRAVENOUS CONTINUOUS
Status: DISCONTINUED | OUTPATIENT
Start: 2021-05-29 | End: 2021-05-30

## 2021-05-29 RX ORDER — ROCURONIUM BROMIDE 10 MG/ML
INJECTION, SOLUTION INTRAVENOUS AS NEEDED
Status: DISCONTINUED | OUTPATIENT
Start: 2021-05-29 | End: 2021-05-29 | Stop reason: HOSPADM

## 2021-05-29 RX ORDER — HYDROMORPHONE HYDROCHLORIDE 1 MG/ML
.25-1 INJECTION, SOLUTION INTRAMUSCULAR; INTRAVENOUS; SUBCUTANEOUS
Status: DISCONTINUED | OUTPATIENT
Start: 2021-05-29 | End: 2021-05-29 | Stop reason: HOSPADM

## 2021-05-29 RX ORDER — SODIUM CHLORIDE, SODIUM LACTATE, POTASSIUM CHLORIDE, CALCIUM CHLORIDE 600; 310; 30; 20 MG/100ML; MG/100ML; MG/100ML; MG/100ML
INJECTION, SOLUTION INTRAVENOUS
Status: DISCONTINUED | OUTPATIENT
Start: 2021-05-29 | End: 2021-05-29 | Stop reason: HOSPADM

## 2021-05-29 RX ORDER — HYDROMORPHONE HYDROCHLORIDE 2 MG/ML
INJECTION, SOLUTION INTRAMUSCULAR; INTRAVENOUS; SUBCUTANEOUS AS NEEDED
Status: DISCONTINUED | OUTPATIENT
Start: 2021-05-29 | End: 2021-05-29 | Stop reason: HOSPADM

## 2021-05-29 RX ORDER — FENTANYL CITRATE 50 UG/ML
INJECTION, SOLUTION INTRAMUSCULAR; INTRAVENOUS AS NEEDED
Status: DISCONTINUED | OUTPATIENT
Start: 2021-05-29 | End: 2021-05-29 | Stop reason: HOSPADM

## 2021-05-29 RX ORDER — CEFAZOLIN SODIUM 1 G/3ML
INJECTION, POWDER, FOR SOLUTION INTRAMUSCULAR; INTRAVENOUS AS NEEDED
Status: DISCONTINUED | OUTPATIENT
Start: 2021-05-29 | End: 2021-05-29 | Stop reason: HOSPADM

## 2021-05-29 RX ORDER — EPHEDRINE SULFATE/0.9% NACL/PF 50 MG/5 ML
SYRINGE (ML) INTRAVENOUS AS NEEDED
Status: DISCONTINUED | OUTPATIENT
Start: 2021-05-29 | End: 2021-05-29 | Stop reason: HOSPADM

## 2021-05-29 RX ORDER — ROPIVACAINE HYDROCHLORIDE 5 MG/ML
INJECTION, SOLUTION EPIDURAL; INFILTRATION; PERINEURAL AS NEEDED
Status: DISCONTINUED | OUTPATIENT
Start: 2021-05-29 | End: 2021-05-29 | Stop reason: HOSPADM

## 2021-05-29 RX ORDER — LIDOCAINE HYDROCHLORIDE 20 MG/ML
INJECTION, SOLUTION EPIDURAL; INFILTRATION; INTRACAUDAL; PERINEURAL AS NEEDED
Status: DISCONTINUED | OUTPATIENT
Start: 2021-05-29 | End: 2021-05-29 | Stop reason: HOSPADM

## 2021-05-29 RX ORDER — PROPOFOL 10 MG/ML
INJECTION, EMULSION INTRAVENOUS AS NEEDED
Status: DISCONTINUED | OUTPATIENT
Start: 2021-05-29 | End: 2021-05-29 | Stop reason: HOSPADM

## 2021-05-29 RX ORDER — DEXAMETHASONE SODIUM PHOSPHATE 4 MG/ML
INJECTION, SOLUTION INTRA-ARTICULAR; INTRALESIONAL; INTRAMUSCULAR; INTRAVENOUS; SOFT TISSUE AS NEEDED
Status: DISCONTINUED | OUTPATIENT
Start: 2021-05-29 | End: 2021-05-29 | Stop reason: HOSPADM

## 2021-05-29 RX ORDER — PHENYLEPHRINE HCL IN 0.9% NACL 0.4MG/10ML
SYRINGE (ML) INTRAVENOUS AS NEEDED
Status: DISCONTINUED | OUTPATIENT
Start: 2021-05-29 | End: 2021-05-29 | Stop reason: HOSPADM

## 2021-05-29 RX ORDER — SUCCINYLCHOLINE CHLORIDE 20 MG/ML
INJECTION INTRAMUSCULAR; INTRAVENOUS AS NEEDED
Status: DISCONTINUED | OUTPATIENT
Start: 2021-05-29 | End: 2021-05-29 | Stop reason: HOSPADM

## 2021-05-29 RX ORDER — MIDAZOLAM HYDROCHLORIDE 1 MG/ML
INJECTION, SOLUTION INTRAMUSCULAR; INTRAVENOUS AS NEEDED
Status: DISCONTINUED | OUTPATIENT
Start: 2021-05-29 | End: 2021-05-29 | Stop reason: HOSPADM

## 2021-05-29 RX ORDER — ALBUMIN HUMAN 50 G/1000ML
SOLUTION INTRAVENOUS
Status: DISCONTINUED | OUTPATIENT
Start: 2021-05-29 | End: 2021-05-29 | Stop reason: HOSPADM

## 2021-05-29 RX ORDER — PROPOFOL 10 MG/ML
INJECTION, EMULSION INTRAVENOUS
Status: DISCONTINUED | OUTPATIENT
Start: 2021-05-29 | End: 2021-05-29 | Stop reason: HOSPADM

## 2021-05-29 RX ADMIN — ROCURONIUM BROMIDE 20 MG: 10 INJECTION INTRAVENOUS at 16:31

## 2021-05-29 RX ADMIN — POTASSIUM CHLORIDE AND SODIUM CHLORIDE: 900; 150 INJECTION, SOLUTION INTRAVENOUS at 12:32

## 2021-05-29 RX ADMIN — MIDAZOLAM HYDROCHLORIDE 2 MG: 2 INJECTION, SOLUTION INTRAMUSCULAR; INTRAVENOUS at 15:24

## 2021-05-29 RX ADMIN — ROCURONIUM BROMIDE 10 MG: 10 INJECTION INTRAVENOUS at 16:10

## 2021-05-29 RX ADMIN — ALBUMIN HUMAN 500 ML/HR: 50 SOLUTION INTRAVENOUS at 17:17

## 2021-05-29 RX ADMIN — DEXAMETHASONE SODIUM PHOSPHATE 4 MG: 4 INJECTION, SOLUTION INTRAMUSCULAR; INTRAVENOUS at 16:19

## 2021-05-29 RX ADMIN — SUCCINYLCHOLINE CHLORIDE 140 MG: 20 INJECTION, SOLUTION INTRAMUSCULAR; INTRAVENOUS at 16:11

## 2021-05-29 RX ADMIN — PROPOFOL 50 MCG/KG/MIN: 10 INJECTION, EMULSION INTRAVENOUS at 16:20

## 2021-05-29 RX ADMIN — HYDROMORPHONE HYDROCHLORIDE 0.5 MG: 2 INJECTION INTRAMUSCULAR; INTRAVENOUS; SUBCUTANEOUS at 16:05

## 2021-05-29 RX ADMIN — Medication 10 MG: at 16:25

## 2021-05-29 RX ADMIN — GLYCOPYRROLATE 0.4 MG: 0.2 INJECTION INTRAMUSCULAR; INTRAVENOUS at 18:15

## 2021-05-29 RX ADMIN — Medication 10 MG: at 16:50

## 2021-05-29 RX ADMIN — FENTANYL CITRATE 50 MCG: 0.05 INJECTION, SOLUTION INTRAMUSCULAR; INTRAVENOUS at 15:24

## 2021-05-29 RX ADMIN — SODIUM CHLORIDE, POTASSIUM CHLORIDE, SODIUM LACTATE AND CALCIUM CHLORIDE: 600; 310; 30; 20 INJECTION, SOLUTION INTRAVENOUS at 16:00

## 2021-05-29 RX ADMIN — Medication 10 ML: at 12:31

## 2021-05-29 RX ADMIN — Medication 3 MG: at 18:15

## 2021-05-29 RX ADMIN — Medication 100 MCG: at 17:16

## 2021-05-29 RX ADMIN — SODIUM CHLORIDE, POTASSIUM CHLORIDE, SODIUM LACTATE AND CALCIUM CHLORIDE: 600; 310; 30; 20 INJECTION, SOLUTION INTRAVENOUS at 16:15

## 2021-05-29 RX ADMIN — TRANEXAMIC ACID 1 G: 100 INJECTION, SOLUTION INTRAVENOUS at 16:25

## 2021-05-29 RX ADMIN — LIDOCAINE HYDROCHLORIDE 100 MG: 20 INJECTION, SOLUTION EPIDURAL; INFILTRATION; INTRACAUDAL; PERINEURAL at 16:10

## 2021-05-29 RX ADMIN — Medication 200 MCG: at 17:28

## 2021-05-29 RX ADMIN — MIDAZOLAM HYDROCHLORIDE 2 MG: 2 INJECTION, SOLUTION INTRAMUSCULAR; INTRAVENOUS at 16:00

## 2021-05-29 RX ADMIN — Medication 10 ML: at 06:05

## 2021-05-29 RX ADMIN — PHENOBARBITAL 129.6 MG: 32.4 TABLET ORAL at 21:39

## 2021-05-29 RX ADMIN — Medication 100 MCG: at 17:41

## 2021-05-29 RX ADMIN — ARFORMOTEROL TARTRATE: 15 SOLUTION RESPIRATORY (INHALATION) at 08:39

## 2021-05-29 RX ADMIN — PROPOFOL 150 MG: 10 INJECTION, EMULSION INTRAVENOUS at 16:10

## 2021-05-29 RX ADMIN — SODIUM CHLORIDE 125 ML/HR: 9 INJECTION, SOLUTION INTRAVENOUS at 20:02

## 2021-05-29 RX ADMIN — ROPIVACAINE HYDROCHLORIDE 30 ML: 5 INJECTION, SOLUTION EPIDURAL; INFILTRATION; PERINEURAL at 15:33

## 2021-05-29 RX ADMIN — FENTANYL CITRATE 50 MCG: 0.05 INJECTION, SOLUTION INTRAMUSCULAR; INTRAVENOUS at 16:00

## 2021-05-29 RX ADMIN — ARFORMOTEROL TARTRATE: 15 SOLUTION RESPIRATORY (INHALATION) at 22:01

## 2021-05-29 RX ADMIN — Medication 100 MCG: at 16:47

## 2021-05-29 RX ADMIN — TRANEXAMIC ACID 1 G: 100 INJECTION, SOLUTION INTRAVENOUS at 18:05

## 2021-05-29 RX ADMIN — Medication 10 ML: at 21:39

## 2021-05-29 RX ADMIN — OXYCODONE 5 MG: 5 TABLET ORAL at 06:05

## 2021-05-29 RX ADMIN — CEFAZOLIN SODIUM 2 G: 1 POWDER, FOR SOLUTION INTRAMUSCULAR; INTRAVENOUS at 16:25

## 2021-05-29 NOTE — OP NOTES
OPERATIVE REPORT    Admit Date: 5/28/2021  Admit Diagnosis: Right Distal Femur Fracture  Date of Procedure: 5/29/2021   Preoperative Diagnosis: RIGHT DISTAL FEMUR FRACTURE  Postoperative Diagnosis: RIGHT DISTAL FEMUR FRACTURE    Procedure: Procedure(s):  RIGHT DISTAL FEMUR OPEN REDUCTION INTERNAL FIXATION  Surgeon: Charlene Lizama MD  Assistant(s): none  Anesthesia: General   Estimated Blood Loss: 600cc  Specimens: * No specimens in log *   Complications: None      INDICATIONS:  Karlie Delgado is a patient with a distal femur fracture with severe comminution and was indicated for surgery for a fracture around an implant which was stable. We discussed risks, alternatives and expectations prior to surgery. The patient was seen for medical clearance. PROCEDURE PERFORMED :   The patient was seen in the pre-operative holding area and the proper limb initialized. Questions were answered and the patient was taken to the operating room for anesthesia. They were positioned in the supine position with a bump under the hip on the OR table and the operative extremity was prepped and draped in a sterile fashion. The appropriate antibiotics were given and time-out was performed prior to the incision. Using a lateral based incision from the distal joint line and along the shaft of the femur the skin was incised. The Iliotibial band was identified and splint longitudinally along with the incision toward the knee exposing the fracture and implant. The vastus lateralus was incised and bleeders coagulated. A reduction was obtained with reduction clamps and the appropriate plate [id number] was applied to the lateral cortex of the femur and centered on the lateral view over the knee and shaft of the femur. The plate was fixed proximally and distally with guide-wires and the reduction again verified under fluoroscopy.      A proximal non-locking cortical screw was placed to reduced the plate to bone and then a separate screw just distal to the fracture was placed. Once the reduction had been obtained the distal locking screws were placed sequentially. The final reduction was verified under fluoroscopic imaging. Cerclage wires were passed to secure the reduction. The plate was brought down to bone and the cables were sequentially tightened. The wound was copiously irrigated with normal saline and closed in layers with 0-Vicryl, #5 Strata-fix, 2-0 Vicryl and Monocryl. A sterile dressing and splint were applied the patient then the patient recovered from anesthesia and was taken to the post-operative holding area in a stable condition. IMPLANTS :   Implant Name Type Inv.  Item Serial No.  Lot No. LRB No. Used Action   PLATE BNE [id number] 10 H NONSTERILE R CNDYL S STL CRV VIRAL - SN/A  PLATE BNE [id number] 10 H NONSTERILE R CNDYL S STL CRV VIRAL N/A DEPU[employer] [place] N/A Right 1 Implanted   SCREW BNE L80MM DIA5MM CNDYL S STL ST MARCIN ANG EUGENIO VIRAL FULL - SN/A  SCREW BNE L80MM DIA5MM CNDYL S STL ST MARCIN ANG EUGENIO VIRAL FULL N/A [employer] [place] N/A Right 3 Implanted   SCREW BNE L35MM DIA5MM CNDYL S STL ST MARCIN ANG EUGENIO VIRAL FULL - SN/A  SCREW BNE L35MM DIA5MM CNDYL S STL ST MARCIN ANG EUGENIO VIRAL FULL N/A [employer] [place] N/A Right 1 Implanted   SCREW BNE L70MM DIA5MM CNDYL S STL ST MARCIN ANG VIRAL FULL THRD - SN/A  SCREW BNE L70MM DIA5MM CNDYL S STL ST MARCIN ANG VIRAL FULL THRD N/A [employer] [place] N/A Right 1 Implanted   SCREW BNE L55MM DIA5MM CNDYL S STL ST MARCIN ANG VIRAL FULL THRD - SN/A  SCREW BNE L55MM DIA5MM CNDYL S STL ST MARCIN ANG VIRAL FULL THRD N/A [employer] [place] N/A Right 1 Implanted   SCR BNE CRTX ST 4.5X36MM SS --  - SN/A  SCR BNE CRTX ST 4.5X36MM SS --  N/A SYN[employer] [place] N/A Right 2 Implanted   SCR BNE CRTX ST 4.5X32MM SS --  - SN/A  SCR BNE CRTX ST 4.5X32MM SS --  N/A SYN[employer] [place] N/A Right 3 Implanted   SCR BNE CRTX ST 4.5X34MM SS --  - SN/A  SCR BNE CRTX ST 4.5X34MM SS --  N/A SYNTHES [place] N/A Right 2 Implanted       POST OPERATIVE CONSIDERATIONS :   TTWB w/ a cane on the left.  No walker due to a right proximal humerus fracture    Danie Arteaga MD  Pager [phone]

## 2021-05-29 NOTE — ANESTHESIA PROCEDURE NOTES
Peripheral Block    Start time: 5/29/2021 3:24 PM  End time: 5/29/2021 3:37 PM  Performed by: Giorgi Hope MD  Authorized by: Giorgi Hope MD       Pre-procedure:    Indications: post-op pain management    Preanesthetic Checklist: patient identified, risks and benefits discussed, site marked, timeout performed, anesthesia consent given and patient being monitored      Block Type:   Block Type:  Femoral single shot  Laterality:  Right  Monitoring:  Continuous pulse ox, frequent vital sign checks, heart rate and responsive to questions  Injection Technique:  Single shot  Procedures: ultrasound guided and nerve stimulator    Patient Position: supine  Prep: chlorhexidine    Needle Type:  Stimuplex  Needle Gauge:  21 G  Needle Localization:  Nerve stimulator and ultrasound guidance  Motor Response comment:   Motor Response: minimal motor response >0.4 mA     Assessment:  Number of attempts:  1  Injection Assessment:  Incremental injection every 5 mL, no paresthesia and no intravascular symptoms  Patient tolerance:  Patient tolerated the procedure well with no immediate complications

## 2021-05-29 NOTE — PROGRESS NOTES
SUBJECTIVE:     Dalia Vaughn is a 76 y.o. female  evaluated for a right distal femur fracture and right proximal . The patient does not have dementia and they are able to verbally converse during the exam. Their mechanism of injury was GLF - tripped while descending stairs. The patient localizes their pain to right shoulder and right knee. Intensity is noted to be severe with movement. This injury occured yesterday. Other concerns include previous ankle fx ORIF.     Past Medical History :   Past Medical History:   Diagnosis Date    Hyperlipidemia     Hypertension     Seizures ([facility] [address]        Past Surgical History :   Past Surgical History:   Procedure Laterality Date    HX GYN      HX ORTHOPAEDIC          Medications :  See med reconciliation    Allergies :   Seasonale [levonorgestrel-ethinyl estrad]     Social History :  Social History     Socioeconomic History    Marital status:      Spouse name: Not on file    Number of children: Not on file    Years of education: Not on file    Highest education level: Not on file   Occupational History    Not on file   Tobacco Use    Smoking status: Never Smoker   Substance and Sexual Activity    Alcohol use: Never    Drug use: Never    Sexual activity: Not on file   Other Topics Concern     Service Not Asked    Blood Transfusions Not Asked    Caffeine Concern Not Asked    Occupational Exposure Not Asked    Hobby Hazards Not Asked    Sleep Concern Not Asked    Stress Concern Not Asked    Weight Concern Not Asked    Special Diet Not Asked    Back Care Not Asked    Exercise Not Asked    Bike Helmet Not Asked   [address] Not Asked    Self-Exams Not Asked   Social History Narrative    Not on file     Social Determinants of Health     Financial Resource Strain:     Difficulty of Paying Living Expenses:    Food Insecurity:     Worried About Running Out of Food in the Last Year:     Nicolle of Food in the Last Year:    Transportation Needs:     Lack of Transportation (Medical):  Lack of Transportation (Non-Medical):    Physical Activity:     Days of Exercise per Week:     Minutes of Exercise per Session:    Stress:     Feeling of Stress :    Social Connections:     Frequency of Communication with Friends and Family:     Frequency of Social Gatherings with Friends and Family:     Attends Temple Services:     Active Member of Clubs or Organizations:     Attends Club or Organization Meetings:     Marital Status:    Intimate Partner Violence:     Fear of Current or Ex-Partner:     Emotionally Abused:     Physically Abused:     Sexually Abused:        Review of Systems:  (bold if positive, if negative)    Gen:  Eyes:  ENT:  CVS:  Pulm:  GI:  :  MS:  Skin:  Psych:  Endo:  Hem:  Renal:  Neuro:         FAMILY HISTORY :  Family History   Problem Relation Age of Onset    Heart Disease Mother     Heart Disease Father        SOCIAL :  Occupation - Homemaker, 2 sons      Objective:     Vitals :  Patient Vitals for the past 12 hrs:   BP Temp Pulse Resp SpO2   05/29/21 1518 (!) 162/56 98.9 °F (37.2 °C) 82 17 95 %   05/29/21 1133 (!) 147/77 99 °F (37.2 °C) 84 16 94 %   05/29/21 0800 (!) 119/57 99.4 °F (37.4 °C) 78 16 95 %   05/29/21 0700   77         Eyes with conjugate movement, PEARLA  Alert and Oriented x 3, Dementia is not present. No Acute Respiratory Distress  Heart and Lung exam normal    Focused Physical Exam :   R, Shoulder - MS intact R/U/M, ROM deferred. Right knee w/ thigh - ROM deferred, intact DF / PF of the foot / ankle. Palpable distal pulses of the UE / LE. No Lymphadenopathy   Palpable pulses  No skin trophic changes    Labs :   Recent Labs     05/29/21  0254   HGB 9.2*   HCT 28.0*   *   K 3.7      CO2 25   BUN 10   CREA 0.59   *       X-rays :   Right shoulder - min displaced / impracted prox. Humerus fx. Right femur / CT - displaced distal 1/3 femur fracture.  No evidence of a vascular injury. ASSESSMENT :  Right shoulder non-displaced prox humerus fx and right supracondylar femur fracture. 1.   Therapy / Weight Bearing Recommendations following surgery : WBAT w/ a cane for the left. 2.    DVT Prophylaxis (Following surgery) : Mechanical and Lovenox  3. Anticipated Disposition : Home  4. Surgery Recommendations : ORIF distal femur fracture. Thank you for allowing to participate in this patients care. Please do not hesitate to contact my office or contact me with any questions or concerns. I will continue to follow the patient in the hospital and arrange for follow-up after the surgery.               Claudette Love MD  Cell [phone]  Ulysses Pearce PA-C  Cell [phone]  Medical Staff : Araceli Mayes  Office : [phone]

## 2021-05-29 NOTE — PROGRESS NOTES
[facility]  [address] [facility], [address] [place] [address]  [phone]      Medical Progress Note      NAME: Andres Gomez   :  1955  MRM:  [id number]    Date of service: 2021  6:59 AM       Assessment and Plan:   1. Right femoral fracture: evaluated by orthopedics and plan for ORIF. Pain management     2. Right Humerus fracture (2021). Non operative management for now. Sling. NWB RUE. Pain management     3. Transaminitis. Resolved. ABD US showed Cholelithiasis. No evidence of acute cholecystitis. acute hepatitis panel is negative. Monitor      4.   Seizure disorder ([address] (2021): Continue home phenobarbital.     5. HTN (hypertension) (2021): Continue home amlodipine, lisinopril.      6. HLD (hyperlipidemia) (2021): Not on home statin. Subjective:     Chief Complaint[de-identified] Patient was seen and examined as a follow up for humeral and femoral fracture. Chart was reviewed. feels well     ROS:  (bold if positive, if negative)    Tolerating PT  Tolerating Diet        Objective:     Last 24hrs VS reviewed since prior progress note.  Most recent are:    Visit Vitals  /72 (BP 1 Location: Left upper arm, BP Patient Position: At rest)   Pulse 82   Temp 98.4 °F (36.9 °C)   Resp 15   Ht 5' 3\" (1.6 m)   Wt 90.7 kg (200 lb)   SpO2 94%   BMI 35.43 kg/m²     SpO2 Readings from Last 6 Encounters:   21 94%        No intake or output data in the 24 hours ending 21 0659     Physical Exam:    Gen:  Well-developed, well-nourished, in no acute distress  HEENT:  Pink conjunctivae, PERRL, hearing intact to voice, moist mucous membranes  Neck:  Supple, without masses, thyroid non-tender  Resp:  No accessory muscle use, clear breath sounds without wheezes rales or rhonchi  Card:  No murmurs, normal S1, S2 without thrills, bruits or peripheral edema  Abd:  Soft, non-tender, non-distended, normoactive bowel sounds are present, no palpable organomegaly and no detectable hernias  Lymph:  No cervical or inguinal adenopathy  Musc:  No cyanosis or clubbing  Skin:  No rashes or ulcers, skin turgor is good  Neuro:  Cranial nerves are grossly intact, no focal motor weakness, follows commands appropriately  Psych:  Good insight, oriented to person, place and time, alert  __________________________________________________________________  Medications Reviewed: (see below)  Medications:     Current Facility-Administered Medications   Medication Dose Route Frequency    sodium chloride (NS) flush 5-40 mL  5-40 mL IntraVENous Q8H    sodium chloride (NS) flush 5-40 mL  5-40 mL IntraVENous PRN    acetaminophen (TYLENOL) tablet 650 mg  650 mg Oral Q6H PRN    Or    acetaminophen (TYLENOL) suppository 650 mg  650 mg Rectal Q6H PRN    polyethylene glycol (MIRALAX) packet 17 g  17 g Oral DAILY PRN    promethazine (PHENERGAN) tablet 12.5 mg  12.5 mg Oral Q6H PRN    Or    ondansetron (ZOFRAN) injection 4 mg  4 mg IntraVENous Q6H PRN    [START ON 5/30/2021] enoxaparin (LOVENOX) injection 40 mg  40 mg SubCUTAneous DAILY    0.9% sodium chloride with KCl 20 mEq/L infusion   IntraVENous CONTINUOUS    HYDROmorphone (DILAUDID) syringe 1 mg  1 mg IntraVENous Q6H PRN    oxyCODONE IR (ROXICODONE) tablet 5 mg  5 mg Oral Q6H PRN    amLODIPine (NORVASC) tablet 5 mg  5 mg Oral QPM    albuterol (PROVENTIL VENTOLIN) nebulizer solution 2.5 mg  2.5 mg Nebulization Q4H PRN    PHENobarbitaL (LUMINAL) tablet 129.6 mg  129.6 mg Oral QHS    lisinopriL (PRINIVIL, ZESTRIL) tablet 40 mg  40 mg Oral QPM    arformoterol 15 mcg/budesonide 0.5 mg neb solution   Nebulization BID RT        Lab Data Reviewed: (see below)  Lab Review:     Recent Labs     05/29/21  0254 05/28/21  1354   WBC 10.1 10.7   HGB 9.2* 10.5*   HCT 28.0* 32.0*    289     Recent Labs     05/29/21  0254 05/28/21  1354   * 133*   K 3.7 3.2*    100   CO2 25 28   * 140*   BUN 10 9   CREA 0.59 0.75   CA 8.5 8.6   MG 2.1  --    ALB 3.4* 3.6   TBILI 0.4 0.3   ALT 68 88*     No results found for: GLUCPOC  No results for input(s): PH, PCO2, PO2, HCO3, FIO2 in the last 72 hours. No results for input(s): INR, INREXT in the last 72 hours. All Micro Results     Procedure Component Value Units Date/Time    COVID-19 RAPID TEST [[id number]] Collected: 05/28/21 5002    Order Status: Completed Specimen: Nasopharyngeal Updated: 05/29/21 0043     Specimen source Nasopharyngeal        COVID-19 rapid test Not detected        Comment: Rapid Abbott ID Now       Rapid NAAT:  The specimen is NEGATIVE for SARS-CoV-2, the novel coronavirus associated with COVID-19. Negative results should be treated as presumptive and, if inconsistent with clinical signs and symptoms or necessary for patient management, should be tested with an alternative molecular assay. Negative results do not preclude SARS-CoV-2 infection and should not be used as the sole basis for patient management decisions. This test has been authorized by the FDA under an Emergency Use Authorization (EUA) for use by authorized laboratories. Fact sheet for Healthcare Providers: ConventionUpdate.co.nz  Fact sheet for Patients: ConventionUpdate.co.nz       Methodology: Isothermal Nucleic Acid Amplification               I have reviewed notes of prior 24hr. Other pertinent lab: Total time spent with patient: 28 I personally reviewed chart, notes, data and current medications in the medical record. I have personally examined and treated the patient at bedside during this period.                  Care Plan discussed with: Patient, Family, Nursing Staff and >50% of time spent in counseling and coordination of care    Discussed:  Care Plan    Prophylaxis:  Lovenox    Disposition:  SNF/LTC           ___________________________________________________    Attending Physician: Agnes Calles MD

## 2021-05-29 NOTE — ANESTHESIA POSTPROCEDURE EVALUATION
Procedure(s):  RIGHT DISTAL FEMUR OPEN REDUCTION INTERNAL FIXATION. general, regional    Anesthesia Post Evaluation      Multimodal analgesia: multimodal analgesia not used between 6 hours prior to anesthesia start to PACU discharge  Patient location during evaluation: PACU  Patient participation: complete - patient participated  Level of consciousness: awake  Pain management: adequate  Airway patency: patent  Anesthetic complications: no  Cardiovascular status: acceptable, blood pressure returned to baseline and hemodynamically stable  Respiratory status: acceptable  Hydration status: acceptable  Post anesthesia nausea and vomiting:  controlled      INITIAL Post-op Vital signs:   Vitals Value Taken Time   /41 05/29/21 1915   Temp 36.8 °C (98.2 °F) 05/29/21 1920   Pulse 77 05/29/21 1931   Resp 12 05/29/21 1931   SpO2 97 % 05/29/21 1931   Vitals shown include unvalidated device data.

## 2021-05-29 NOTE — PROGRESS NOTES
Problem: Falls - Risk of  Goal: *Absence of Falls  Description: Document Manav Leonard Fall Risk and appropriate interventions in the flowsheet. Outcome: Progressing Towards Goal  Note: Fall Risk Interventions:  Mobility Interventions: Patient to call before getting OOB, Strengthening exercises (ROM-active/passive), Utilize walker, cane, or other assistive device         Medication Interventions: Patient to call before getting OOB, Teach patient to arise slowly    Elimination Interventions: Call light in reach, Bed/chair exit alarm, Patient to call for help with toileting needs, Toilet paper/wipes in reach, Toileting schedule/hourly rounds    History of Falls Interventions: Bed/chair exit alarm, Utilize gait belt for transfer/ambulation         Problem: Pressure Injury - Risk of  Goal: *Prevention of pressure injury  Description: Document Elbert Scale and appropriate interventions in the flowsheet.   Outcome: Progressing Towards Goal  Note: Pressure Injury Interventions:  Sensory Interventions: Float heels, Minimize linen layers    Moisture Interventions: Absorbent underpads, Minimize layers    Activity Interventions: Increase time out of bed, PT/OT evaluation    Mobility Interventions: Float heels, HOB 30 degrees or less, PT/OT evaluation    Nutrition Interventions: Document food/fluid/supplement intake, Offer support with meals,snacks and hydration    Friction and Shear Interventions: HOB 30 degrees or less, Lift sheet, Minimize layers

## 2021-05-29 NOTE — CONSULTS
ORTHOPEDIC SURGERY CONSULT    Subjective:     Date of Consultation:  May 28, 2021    Referring Physician:  Dr. Kd Curtis is a 76 y.o. female who is being seen for a displaced and comminuted right distal femur fracture and a comminuted and minimally displaced right proximal humerus fracture. She has a past medical history of HLD, hypertension, and seizure disorder. She presented to the [facility] ER via EMS after suffering a ground level fall at home today. Per the patient and her  she was ambulating down some steps and then was walking near her house when she tripped over a 6 inch tall step in the yard. She fell landing awkwardly on her right side. She suffered the above fractures. We have been asked to manage the fractures. She had a right ankle ORIF done by Dr. José Miguel Manzo previously. She reports she ambulates daily without walker or cane. She did not have precipitating pain before the fall. She denies a history chest pain or syncopal episodes. She denies head trauma. Patient Active Problem List    Diagnosis Date Noted    Humerus fracture 05/28/2021    HTN (hypertension) 05/28/2021    HLD (hyperlipidemia) 05/28/2021    Seizure disorder ([address] 05/28/2021    Elevated LFTs 05/28/2021     Family History   Problem Relation Age of Onset    Heart Disease Mother     Heart Disease Father       Social History     Tobacco Use    Smoking status: Never Smoker   Substance Use Topics    Alcohol use: Never     Past Medical History:   Diagnosis Date    Hyperlipidemia     Hypertension     Seizures ([address]       Past Surgical History:   Procedure Laterality Date    HX GYN      HX ORTHOPAEDIC        Prior to Admission medications    Medication Sig Start Date End Date Taking? Authorizing Provider   albuterol (PROVENTIL HFA, VENTOLIN HFA, PROAIR HFA) 90 mcg/actuation inhaler Take 2 Puffs by inhalation every four (4) hours as needed for Wheezing.    Yes Provider, Historical   amLODIPine (NORVASC) 5 mg tablet Take 5 mg by mouth every evening. Yes Provider, Historical   diclofenac EC (VOLTAREN) 75 mg EC tablet Take 75 mg by mouth two (2) times a day. Takes with Tylenol Arthritis   Yes Provider, Historical   fluticasone propion-salmeteroL (Wixela Inhub) 250-50 mcg/dose diskus inhaler Take 1 Puff by inhalation every twelve (12) hours. Yes Provider, Historical   lisinopril-hydroCHLOROthiazide (PRINZIDE, ZESTORETIC) 20-12.5 mg per tablet Take 2 Tablets by mouth every evening. Yes Provider, Historical   PHENobarbitaL (LUMINAL) 32.4 mg tablet Take 129.6 mg by mouth nightly. 129.6 mg = 4 tablets   Yes Provider, Historical   acetaminophen (Tylenol Arthritis Pain) 650 mg TbER Take 1,300 mg by mouth two (2) times a day.  Takes with diclofenac   Yes Provider, Historical     Current Facility-Administered Medications   Medication Dose Route Frequency    sodium chloride (NS) flush 5-40 mL  5-40 mL IntraVENous Q8H    sodium chloride (NS) flush 5-40 mL  5-40 mL IntraVENous PRN    acetaminophen (TYLENOL) tablet 650 mg  650 mg Oral Q6H PRN    Or    acetaminophen (TYLENOL) suppository 650 mg  650 mg Rectal Q6H PRN    polyethylene glycol (MIRALAX) packet 17 g  17 g Oral DAILY PRN    promethazine (PHENERGAN) tablet 12.5 mg  12.5 mg Oral Q6H PRN    Or    ondansetron (ZOFRAN) injection 4 mg  4 mg IntraVENous Q6H PRN    [START ON 5/30/2021] enoxaparin (LOVENOX) injection 40 mg  40 mg SubCUTAneous DAILY    potassium chloride SR (KLOR-CON 10) tablet 40 mEq  40 mEq Oral NOW    0.9% sodium chloride with KCl 20 mEq/L infusion   IntraVENous CONTINUOUS    HYDROmorphone (DILAUDID) syringe 1 mg  1 mg IntraVENous Q6H PRN    oxyCODONE IR (ROXICODONE) tablet 5 mg  5 mg Oral Q6H PRN    amLODIPine (NORVASC) tablet 5 mg  5 mg Oral QPM    albuterol (PROVENTIL VENTOLIN) nebulizer solution 2.5 mg  2.5 mg Nebulization Q4H PRN    PHENobarbitaL (LUMINAL) tablet 129.6 mg  129.6 mg Oral QHS    lisinopriL (PRINIVIL, ZESTRIL) tablet 40 mg  40 mg Oral QPM    arformoterol 15 mcg/budesonide 0.5 mg neb solution   Nebulization BID RT     Allergies   Allergen Reactions    Seasonale [Levonorgestrel-Ethinyl Estrad] Runny Nose        Review of Systems:  Pertinent items are noted in HPI. Objective:     Patient Vitals for the past 8 hrs:   BP Temp Pulse Resp SpO2   21 2120     98 %   21 2018 (!) 141/59 98.3 °F (36.8 °C) 70 15 97 %   21 1845 (!) 140/33  69 14 100 %   21 1745 (!) 136/39    97 %   21 1715 (!) 132/37    96 %   21 1700 (!) 139/35    96 %   21 1645 (!) 152/47    98 %   21 1631 (!) 160/45    100 %   21 1545 (!) 157/43    97 %   21 1345 (!) 176/46    99 %     Temp (24hrs), Av.2 °F (36.8 °C), Min:98.1 °F (36.7 °C), Max:98.3 °F (36.8 °C)        EXAM: GEN: Well developed and nurtured in NAD  PSYCH:  AAO x 4  MUSC: Right leg with obvious deformity of the right distal femur. Skin is intact. There are no abrasions. Hip and knee ROM not tested due to fracture. DP and PT pulses present and palpable. BCR of all digits  Of the foot. SILT right LE. Right humerus: no abrasions. Deltoid contracts. +radial and ulnar pulses. ROM not tested due to fracture. SILT RUE. BCR of all digits. IMAGING:        FINDINGS:    There is no evidence for traumatic injury to the right lower extremity vessels. There is mild atherosclerosis of the right superficial femoral artery and  popliteal artery. There is three-vessel runoff to the right lower extremity. There is a comminuted distal right femur fracture with lateral rotation of the  distal fracture fragment and medial displacement. There is interarticular  extension. There is no evidence of an underlying bone lesion. There is  evidence of prior distal tibial and fibular ORIF. There are old right superior  and inferior pubic ramus fractures. There is a lipehemarthrosis.     IMPRESSION  1.  No evidence for vascular injury of the right lower extremity. 2. Comminuted intra-articular displaced rotated distal right femur fracture. No  underlying bone lesion. 3. Lipohemarthrosis.     Exam: Three views of the right shoulder and AP and lateral views of the right  humerus.     FINDINGS: There is an acute, comminuted, mildly displaced fracture of the right  humeral head. There is no dislocation. Osseous structures are diffusely  demineralized.     IMPRESSION  Acute, comminuted, mildly displaced fracture of the right humeral  head. Data Review   Recent Results (from the past 24 hour(s))   CBC WITH AUTOMATED DIFF    Collection Time: 05/28/21  1:54 PM   Result Value Ref Range    WBC 10.7 3.6 - 11.0 K/uL    RBC 3.31 (L) 3.80 - 5.20 M/uL    HGB 10.5 (L) 11.5 - 16.0 g/dL    HCT 32.0 (L) 35.0 - 47.0 %    MCV 96.7 80.0 - 99.0 FL    MCH 31.7 26.0 - 34.0 PG    MCHC 32.8 30.0 - 36.5 g/dL    RDW 12.0 11.5 - 14.5 %    PLATELET 777 775 - 556 K/uL    MPV 9.5 8.9 - 12.9 FL    NRBC 0.0 0  WBC    ABSOLUTE NRBC 0.00 0.00 - 0.01 K/uL    NEUTROPHILS 83 (H) 32 - 75 %    LYMPHOCYTES 10 (L) 12 - 49 %    MONOCYTES 4 (L) 5 - 13 %    EOSINOPHILS 1 0 - 7 %    BASOPHILS 1 0 - 1 %    IMMATURE GRANULOCYTES 1 (H) 0.0 - 0.5 %    ABS. NEUTROPHILS 9.0 (H) 1.8 - 8.0 K/UL    ABS. LYMPHOCYTES 1.0 0.8 - 3.5 K/UL    ABS. MONOCYTES 0.4 0.0 - 1.0 K/UL    ABS. EOSINOPHILS 0.1 0.0 - 0.4 K/UL    ABS. BASOPHILS 0.1 0.0 - 0.1 K/UL    ABS. IMM.  GRANS. 0.1 (H) 0.00 - 0.04 K/UL    DF AUTOMATED     METABOLIC PANEL, COMPREHENSIVE    Collection Time: 05/28/21  1:54 PM   Result Value Ref Range    Sodium 133 (L) 136 - 145 mmol/L    Potassium 3.2 (L) 3.5 - 5.1 mmol/L    Chloride 100 97 - 108 mmol/L    CO2 28 21 - 32 mmol/L    Anion gap 5 5 - 15 mmol/L    Glucose 140 (H) 65 - 100 mg/dL    BUN 9 6 - 20 MG/DL    Creatinine 0.75 0.55 - 1.02 MG/DL    BUN/Creatinine ratio 12 12 - 20      GFR est AA >60 >60 ml/min/1.73m2    GFR est non-AA >60 >60 ml/min/1.73m2    Calcium 8.6 8.5 - 10.1 MG/DL    Bilirubin, total 0.3 0.2 - 1.0 MG/DL    ALT (SGPT) 88 (H) 12 - 78 U/L    AST (SGOT) 70 (H) 15 - 37 U/L    Alk. phosphatase 284 (H) 45 - 117 U/L    Protein, total 7.5 6.4 - 8.2 g/dL    Albumin 3.6 3.5 - 5.0 g/dL    Globulin 3.9 2.0 - 4.0 g/dL    A-G Ratio 0.9 (L) 1.1 - 2.2           Assessment/Plan:   A: 1. Displaced and comminuted intra-articular distal femur fracture, right  2. Right proximal humerus surgical neck fracture displaced    P: 1. Right femur: Plan for ORIF of femur tomorrow with Dr. Cassy Devine. NPO after midnight. Case posted. Consent place. 1 dose of Heparin 5000 SC tonight. NWB. Bed rest.  Preop labs. Knee immobilizer placed in the ER. Neurovascular checks. 2. Right proximal humerus: Non operative management for now. Sling. NWB RUE. 3. Analgesics: Dilaudid IV 1 mg PRN. Roxicodone 5 mg PO q4 hours. 4. DISPO: SNF likely. 5. Orthopedics to follow. Discussed case with Dr. Cassy Devine who agrees with plan.        Emani Duty, [address] [facility]   Orthopaedic Surgery [facility]  [address] [facility]

## 2021-05-29 NOTE — PROGRESS NOTES
Problem: Falls - Risk of  Goal: *Absence of Falls  Description: Document Kranthi Oseiire Fall Risk and appropriate interventions in the flowsheet.   Outcome: Progressing Towards Goal  Note: Fall Risk Interventions:  Mobility Interventions: Bed/chair exit alarm, Patient to call before getting OOB         Medication Interventions: Bed/chair exit alarm    Elimination Interventions: Bed/chair exit alarm, Call light in reach, Patient to call for help with toileting needs    History of Falls Interventions: Bed/chair exit alarm         Problem: Patient Education: Go to Patient Education Activity  Goal: Patient/Family Education  Outcome: Progressing Towards Goal

## 2021-05-29 NOTE — PERIOP NOTES
Spoke to patients  Chantel Meneses and notified him of patient condition, any and all questions answered. Patient being transferred back to room 413 for further medical management.

## 2021-05-29 NOTE — ANESTHESIA PREPROCEDURE EVALUATION
Relevant Problems   No relevant active problems       Anesthetic History   No history of anesthetic complications            Review of Systems / Medical History  Patient summary reviewed, nursing notes reviewed and pertinent labs reviewed    Pulmonary  Within defined limits                 Neuro/Psych     seizures: well controlled         Cardiovascular    Hypertension              Exercise tolerance: >4 METS     GI/Hepatic/Renal  Within defined limits              Endo/Other        Obesity     Other Findings   Comments: Deconditioning - s/p ground level fall with femur and humerus fractures           Physical Exam    Airway  Mallampati: III    Neck ROM: normal range of motion   Mouth opening: Normal     Cardiovascular  Regular rate and rhythm,  S1 and S2 normal,  no murmur, click, rub, or gallop  Rhythm: regular  Rate: normal         Dental  No notable dental hx       Pulmonary  Breath sounds clear to auscultation               Abdominal  GI exam deferred       Other Findings            Anesthetic Plan    ASA: 3  Anesthesia type: general and regional - femoral single shot          Induction: Intravenous  Anesthetic plan and risks discussed with: Patient      Discussed fem/sciatic and spinal with patient - she would like to proceed with a femoral block and general anesthesia

## 2021-05-30 ENCOUNTER — APPOINTMENT (OUTPATIENT)
Dept: GENERAL RADIOLOGY | Age: 66
DRG: 481 | End: 2021-05-30
Attending: PHYSICIAN ASSISTANT
Payer: MEDICARE

## 2021-05-30 LAB
ALBUMIN SERPL-MCNC: 3.6 G/DL (ref 3.5–5)
ALBUMIN/GLOB SERPL: 1.3 {RATIO} (ref 1.1–2.2)
ALP SERPL-CCNC: 191 U/L (ref 45–117)
ALT SERPL-CCNC: 44 U/L (ref 12–78)
ANION GAP SERPL CALC-SCNC: 5 MMOL/L (ref 5–15)
AST SERPL-CCNC: 29 U/L (ref 15–37)
BASOPHILS # BLD: 0 K/UL (ref 0–0.1)
BASOPHILS NFR BLD: 0 % (ref 0–1)
BILIRUB SERPL-MCNC: 0.4 MG/DL (ref 0.2–1)
BUN SERPL-MCNC: 14 MG/DL (ref 6–20)
BUN/CREAT SERPL: 23 (ref 12–20)
CALCIUM SERPL-MCNC: 8 MG/DL (ref 8.5–10.1)
CHLORIDE SERPL-SCNC: 106 MMOL/L (ref 97–108)
CO2 SERPL-SCNC: 25 MMOL/L (ref 21–32)
CREAT SERPL-MCNC: 0.61 MG/DL (ref 0.55–1.02)
DIFFERENTIAL METHOD BLD: ABNORMAL
EOSINOPHIL # BLD: 0 K/UL (ref 0–0.4)
EOSINOPHIL NFR BLD: 0 % (ref 0–7)
ERYTHROCYTE [DISTWIDTH] IN BLOOD BY AUTOMATED COUNT: 12.1 % (ref 11.5–14.5)
GLOBULIN SER CALC-MCNC: 2.8 G/DL (ref 2–4)
GLUCOSE SERPL-MCNC: 118 MG/DL (ref 65–100)
HCT VFR BLD AUTO: 19.6 % (ref 35–47)
HCT VFR BLD AUTO: 20.3 % (ref 35–47)
HGB BLD-MCNC: 6.4 G/DL (ref 11.5–16)
HGB BLD-MCNC: 6.5 G/DL (ref 11.5–16)
HISTORY CHECKED?,CKHIST: NORMAL
IMM GRANULOCYTES # BLD AUTO: 0.1 K/UL (ref 0–0.04)
IMM GRANULOCYTES NFR BLD AUTO: 1 % (ref 0–0.5)
LYMPHOCYTES # BLD: 1.2 K/UL (ref 0.8–3.5)
LYMPHOCYTES NFR BLD: 10 % (ref 12–49)
MAGNESIUM SERPL-MCNC: 1.9 MG/DL (ref 1.6–2.4)
MCH RBC QN AUTO: 31.9 PG (ref 26–34)
MCHC RBC AUTO-ENTMCNC: 32 G/DL (ref 30–36.5)
MCV RBC AUTO: 99.5 FL (ref 80–99)
MONOCYTES # BLD: 0.8 K/UL (ref 0–1)
MONOCYTES NFR BLD: 7 % (ref 5–13)
NEUTS SEG # BLD: 9.8 K/UL (ref 1.8–8)
NEUTS SEG NFR BLD: 82 % (ref 32–75)
NRBC # BLD: 0 K/UL (ref 0–0.01)
NRBC BLD-RTO: 0 PER 100 WBC
PLATELET # BLD AUTO: 199 K/UL (ref 150–400)
PMV BLD AUTO: 10.5 FL (ref 8.9–12.9)
POTASSIUM SERPL-SCNC: 4.1 MMOL/L (ref 3.5–5.1)
PROT SERPL-MCNC: 6.4 G/DL (ref 6.4–8.2)
RBC # BLD AUTO: 2.04 M/UL (ref 3.8–5.2)
RBC MORPH BLD: ABNORMAL
SODIUM SERPL-SCNC: 136 MMOL/L (ref 136–145)
WBC # BLD AUTO: 11.9 K/UL (ref 3.6–11)

## 2021-05-30 PROCEDURE — 77010033678 HC OXYGEN DAILY

## 2021-05-30 PROCEDURE — 73552 X-RAY EXAM OF FEMUR 2/>: CPT

## 2021-05-30 PROCEDURE — 94760 N-INVAS EAR/PLS OXIMETRY 1: CPT

## 2021-05-30 PROCEDURE — 30233N1 TRANSFUSION OF NONAUTOLOGOUS RED BLOOD CELLS INTO PERIPHERAL VEIN, PERCUTANEOUS APPROACH: ICD-10-PCS | Performed by: INTERNAL MEDICINE

## 2021-05-30 PROCEDURE — 94640 AIRWAY INHALATION TREATMENT: CPT

## 2021-05-30 PROCEDURE — 80053 COMPREHEN METABOLIC PANEL: CPT

## 2021-05-30 PROCEDURE — 74011000250 HC RX REV CODE- 250: Performed by: ORTHOPAEDIC SURGERY

## 2021-05-30 PROCEDURE — 65660000000 HC RM CCU STEPDOWN

## 2021-05-30 PROCEDURE — 74011250636 HC RX REV CODE- 250/636: Performed by: INTERNAL MEDICINE

## 2021-05-30 PROCEDURE — 74011250636 HC RX REV CODE- 250/636: Performed by: ORTHOPAEDIC SURGERY

## 2021-05-30 PROCEDURE — 85025 COMPLETE CBC W/AUTO DIFF WBC: CPT

## 2021-05-30 PROCEDURE — 83735 ASSAY OF MAGNESIUM: CPT

## 2021-05-30 PROCEDURE — 74011000250 HC RX REV CODE- 250: Performed by: INTERNAL MEDICINE

## 2021-05-30 PROCEDURE — P9016 RBC LEUKOCYTES REDUCED: HCPCS

## 2021-05-30 PROCEDURE — 85018 HEMOGLOBIN: CPT

## 2021-05-30 PROCEDURE — 74011250637 HC RX REV CODE- 250/637: Performed by: INTERNAL MEDICINE

## 2021-05-30 PROCEDURE — 36415 COLL VENOUS BLD VENIPUNCTURE: CPT

## 2021-05-30 PROCEDURE — 36430 TRANSFUSION BLD/BLD COMPNT: CPT

## 2021-05-30 RX ORDER — SODIUM CHLORIDE 9 MG/ML
250 INJECTION, SOLUTION INTRAVENOUS AS NEEDED
Status: DISCONTINUED | OUTPATIENT
Start: 2021-05-30 | End: 2021-06-05 | Stop reason: HOSPADM

## 2021-05-30 RX ADMIN — CEFAZOLIN 2 G: 1 INJECTION, POWDER, FOR SOLUTION INTRAMUSCULAR; INTRAVENOUS at 09:06

## 2021-05-30 RX ADMIN — Medication 10 ML: at 05:22

## 2021-05-30 RX ADMIN — LISINOPRIL 40 MG: 20 TABLET ORAL at 16:52

## 2021-05-30 RX ADMIN — ENOXAPARIN SODIUM 40 MG: 40 INJECTION SUBCUTANEOUS at 09:06

## 2021-05-30 RX ADMIN — Medication 10 ML: at 22:14

## 2021-05-30 RX ADMIN — CEFAZOLIN 2 G: 1 INJECTION, POWDER, FOR SOLUTION INTRAMUSCULAR; INTRAVENOUS at 16:51

## 2021-05-30 RX ADMIN — Medication 10 ML: at 11:52

## 2021-05-30 RX ADMIN — ACETAMINOPHEN 650 MG: 325 TABLET ORAL at 05:19

## 2021-05-30 RX ADMIN — AMLODIPINE BESYLATE 5 MG: 5 TABLET ORAL at 16:53

## 2021-05-30 RX ADMIN — CEFAZOLIN 2 G: 1 INJECTION, POWDER, FOR SOLUTION INTRAMUSCULAR; INTRAVENOUS at 00:14

## 2021-05-30 RX ADMIN — SODIUM CHLORIDE 125 ML/HR: 9 INJECTION, SOLUTION INTRAVENOUS at 05:21

## 2021-05-30 RX ADMIN — ARFORMOTEROL TARTRATE: 15 SOLUTION RESPIRATORY (INHALATION) at 20:00

## 2021-05-30 RX ADMIN — ARFORMOTEROL TARTRATE: 15 SOLUTION RESPIRATORY (INHALATION) at 07:44

## 2021-05-30 RX ADMIN — PHENOBARBITAL 129.6 MG: 32.4 TABLET ORAL at 22:14

## 2021-05-30 RX ADMIN — OXYCODONE 5 MG: 5 TABLET ORAL at 00:15

## 2021-05-30 NOTE — PROGRESS NOTES
Orthopedic NP Progress Note  Post Op day: 1 Day Post-Op    May 30, 2021 9:24 AM     Hamida Jorden    Attending Physician: Treatment Team: Attending Provider: Aneesh Lynne MD; Consulting Provider: Nakia Contreras MD; Consulting Provider: Nirali Laughlin DO; Consulting Provider: JANETTE Glass; Primary Nurse: Rafael He RN; Care Manager: Colleen Clark     Vital Signs:    Patient Vitals for the past 8 hrs:   BP Temp Pulse Resp SpO2   05/30/21 0917 (!) 115/51 98.8 °F (37.1 °C) (!) 107 16 94 %   05/30/21 0744     95 %   05/30/21 0700   (!) 104     05/30/21 0404 116/69 (!) 100.8 °F (38.2 °C) 97 20    05/30/21 0206   80       BMI (calculated): 35.4 (05/28/21 1312)    Intake/Output:  No intake/output data recorded. 05/28 1901 - 05/30 0700  In: 5196.3 [I.V.:5196.3]  Out: 1550 [Urine:950]    Pain Control:   Pain Assessment  Pain Scale 1: Numeric (0 - 10)  Pain Intensity 1: 3  Pain Location 1: Leg  Pain Orientation 1: Right  Pain Description 1: Aching  Pain Intervention(s) 1: Medication (see MAR)    LAB:    Recent Labs     05/30/21  0404   HCT 20.3*   HGB 6.5*     Lab Results   Component Value Date/Time    Sodium 136 05/30/2021 12:40 AM    Potassium 4.1 05/30/2021 12:40 AM    Chloride 106 05/30/2021 12:40 AM    CO2 25 05/30/2021 12:40 AM    Glucose 118 (H) 05/30/2021 12:40 AM    BUN 14 05/30/2021 12:40 AM    Creatinine 0.61 05/30/2021 12:40 AM    Calcium 8.0 (L) 05/30/2021 12:40 AM       Subjective:  Hamida Leonardo is a 76 y.o. female s/p a  Procedure(s):  RIGHT DISTAL FEMUR OPEN REDUCTION INTERNAL FIXATION   Procedure(s):  RIGHT DISTAL FEMUR OPEN REDUCTION INTERNAL FIXATION. Tolerating diet. Reports pain well managed at this time        Objective: General: alert, cooperative, no distress. Neuro/Vascular: CNS Intact. Sensation stable. Brisk cap refill, 2+ pulses UE/LE  Musculoskeletal:  +ROM LUE/LLE, RUE in position of comfort with pillows supporting/ splinting, NVI .  RLE - +DF/PF, with min edema, NVI   Skin: Incision - clean, dry and intact. No significant erythema or swelling.     Dressing: clean, dry, and intact    Andrews - n  Drain - n       PT/OT:   Gait:                      Assessment:    s/p Procedure(s):  RIGHT DISTAL FEMUR OPEN REDUCTION INTERNAL FIXATION    Active Problems:    Humerus fracture (5/28/2021)      HTN (hypertension) (5/28/2021)      HLD (hyperlipidemia) (5/28/2021)      Seizure disorder ([facility] [address] (5/28/2021)      Elevated LFTs (5/28/2021)         Plan:   -  Continue PT/OT - TTWB RLE with cane, NWB RUE   -  Continue established methods of pain control  -  VTE Prophylaxes - TEDS &/or SCDs with lovenox 40 mg daily for 30 days   -  Acute Blood Anemia with chronic Anemia - hgb 6.4 this AM, monitoring       Discharge To:  TBD     Signed By: Melyssa Cruz NP    Orthopedic Nurse Practitioner

## 2021-05-30 NOTE — PROGRESS NOTES
Problem: Falls - Risk of  Goal: *Absence of Falls  Description: Document Alexis Schnicoláske Fall Risk and appropriate interventions in the flowsheet. Outcome: Progressing Towards Goal  Note: Fall Risk Interventions:  Mobility Interventions: Bed/chair exit alarm, Patient to call before getting OOB, Strengthening exercises (ROM-active/passive), Utilize walker, cane, or other assistive device         Medication Interventions: Bed/chair exit alarm, Patient to call before getting OOB, Teach patient to arise slowly    Elimination Interventions: Call light in reach    History of Falls Interventions: Utilize gait belt for transfer/ambulation         Problem: Pressure Injury - Risk of  Goal: *Prevention of pressure injury  Description: Document Elbert Scale and appropriate interventions in the flowsheet.   Outcome: Progressing Towards Goal  Note: Pressure Injury Interventions:  Sensory Interventions: Float heels, Minimize linen layers    Moisture Interventions: Absorbent underpads, Minimize layers    Activity Interventions: Increase time out of bed, PT/OT evaluation    Mobility Interventions: Float heels, HOB 30 degrees or less    Nutrition Interventions: Document food/fluid/supplement intake, Offer support with meals,snacks and hydration    Friction and Shear Interventions: HOB 30 degrees or less, Lift sheet, Minimize layers

## 2021-05-30 NOTE — PROGRESS NOTES
Bhavin Ndiaye [facility] [address]  [address]  [phone]      Medical Progress Note      NAME: Samina Boyer   :  1955  MRM:  [id number]    Date of service: 2021  6:59 AM       Assessment and Plan:   1. Right femoral fracture: evaluated by orthopedics and had ORIF on . Pain management     2. Right Humerus fracture (2021). Non operative management for now. Sling. NWB RUE. Pain management     3. Anemia. Significant drop from baseline. Likely blood loss from surgery. Repeat Hb and transfuse as needed to keep Hb > 7.     4.  Transaminitis. Resolved. ABD US showed Cholelithiasis. No evidence of acute cholecystitis. acute hepatitis panel is negative. Monitor      5.   Seizure disorder ([address] (2021): Continue home phenobarbital.     6. HTN (hypertension) (2021): Continue home amlodipine, lisinopril.      7. HLD (hyperlipidemia) (2021): Not on home statin. Addendum  Her repeated Hb is still low. Will transfuse one unit of PRBC. Subjective:     Chief Complaint[de-identified] Patient was seen and examined as a follow up for humeral and femoral fracture. Chart was reviewed. Fells well       ROS:  (bold if positive, if negative)    Tolerating PT  Tolerating Diet        Objective:     Last 24hrs VS reviewed since prior progress note.  Most recent are:    Visit Vitals  /69   Pulse 97   Temp (!) 100.8 °F (38.2 °C)   Resp 20   Ht 5' 3\" (1.6 m)   Wt 90.7 kg (200 lb)   SpO2 92%   BMI 35.43 kg/m²     SpO2 Readings from Last 6 Encounters:   21 92%    O2 Flow Rate (L/min): 2 l/min       Intake/Output Summary (Last 24 hours) at 2021 0716  Last data filed at 2021 1007  Gross per 24 hour   Intake 5196.25 ml   Output 1550 ml   Net 3646.25 ml        Physical Exam:    Gen:  Well-developed, well-nourished, in no acute distress  HEENT:  Pink conjunctivae, PERRL, hearing intact to voice, moist mucous membranes  Neck:  Supple, without masses, thyroid non-tender  Resp:  No accessory muscle use, clear breath sounds without wheezes rales or rhonchi  Card:  No murmurs, normal S1, S2 without thrills, bruits or peripheral edema  Abd:  Soft, non-tender, non-distended, normoactive bowel sounds are present, no palpable organomegaly and no detectable hernias  Lymph:  No cervical or inguinal adenopathy  Musc:  No cyanosis or clubbing  Skin:  No rashes or ulcers, skin turgor is good  Neuro:  Cranial nerves are grossly intact, no focal motor weakness, follows commands appropriately  Psych:  Good insight, oriented to person, place and time, alert  __________________________________________________________________  Medications Reviewed: (see below)  Medications:     Current Facility-Administered Medications   Medication Dose Route Frequency    lactated Ringers infusion  125 mL/hr IntraVENous CONTINUOUS    0.9% sodium chloride infusion  125 mL/hr IntraVENous CONTINUOUS    ceFAZolin (ANCEF) 2 g in sterile water (preservative free) 20 mL IV syringe  2 g IntraVENous Q8H    sodium chloride (NS) flush 5-40 mL  5-40 mL IntraVENous Q8H    sodium chloride (NS) flush 5-40 mL  5-40 mL IntraVENous PRN    acetaminophen (TYLENOL) tablet 650 mg  650 mg Oral Q6H PRN    Or    acetaminophen (TYLENOL) suppository 650 mg  650 mg Rectal Q6H PRN    polyethylene glycol (MIRALAX) packet 17 g  17 g Oral DAILY PRN    promethazine (PHENERGAN) tablet 12.5 mg  12.5 mg Oral Q6H PRN    Or    ondansetron (ZOFRAN) injection 4 mg  4 mg IntraVENous Q6H PRN    enoxaparin (LOVENOX) injection 40 mg  40 mg SubCUTAneous DAILY    0.9% sodium chloride with KCl 20 mEq/L infusion   IntraVENous CONTINUOUS    HYDROmorphone (DILAUDID) syringe 1 mg  1 mg IntraVENous Q6H PRN    oxyCODONE IR (ROXICODONE) tablet 5 mg  5 mg Oral Q6H PRN    amLODIPine (NORVASC) tablet 5 mg  5 mg Oral QPM    albuterol (PROVENTIL VENTOLIN) nebulizer solution 2.5 mg  2.5 mg Nebulization Q4H PRN    PHENobarbitaL (LUMINAL) tablet 129.6 mg  129.6 mg Oral QHS    lisinopriL (PRINIVIL, ZESTRIL) tablet 40 mg  40 mg Oral QPM    arformoterol 15 mcg/budesonide 0.5 mg neb solution   Nebulization BID RT        Lab Data Reviewed: (see below)  Lab Review:     Recent Labs     05/30/21  0404 05/29/21  0254 05/28/21  1354   WBC 11.9* 10.1 10.7   HGB 6.5* 9.2* 10.5*   HCT 20.3* 28.0* 32.0*    297 289     Recent Labs     05/30/21  0040 05/29/21  0254 05/28/21  1354    134* 133*   K 4.1 3.7 3.2*    102 100   CO2 25 25 28   * 132* 140*   BUN 14 10 9   CREA 0.61 0.59 0.75   CA 8.0* 8.5 8.6   MG 1.9 2.1  --    ALB 3.6 3.4* 3.6   TBILI 0.4 0.4 0.3   ALT 44 68 88*     No results found for: GLUCPOC  No results for input(s): PH, PCO2, PO2, HCO3, FIO2 in the last 72 hours. No results for input(s): INR, INREXT, INREXT in the last 72 hours. All Micro Results     Procedure Component Value Units Date/Time    COVID-19 RAPID TEST [[id number]] Collected: 05/28/21 6936    Order Status: Completed Specimen: Nasopharyngeal Updated: 05/29/21 0043     Specimen source Nasopharyngeal        COVID-19 rapid test Not detected        Comment: Rapid Abbott ID Now       Rapid NAAT:  The specimen is NEGATIVE for SARS-CoV-2, the novel coronavirus associated with COVID-19. Negative results should be treated as presumptive and, if inconsistent with clinical signs and symptoms or necessary for patient management, should be tested with an alternative molecular assay. Negative results do not preclude SARS-CoV-2 infection and should not be used as the sole basis for patient management decisions. This test has been authorized by the FDA under an Emergency Use Authorization (EUA) for use by authorized laboratories.    Fact sheet for Healthcare Providers: ConventionUpdate.co.nz  Fact sheet for Patients: ConventionUpdate.co.nz       Methodology: Isothermal Nucleic Acid Amplification               I have reviewed notes of prior 24hr. Other pertinent lab: Total time spent with patient: 28 I personally reviewed chart, notes, data and current medications in the medical record. I have personally examined and treated the patient at bedside during this period.                  Care Plan discussed with: Patient, Family, Nursing Staff and >50% of time spent in counseling and coordination of care    Discussed:  Care Plan    Prophylaxis:  Lovenox    Disposition:  SNF/LTC           ___________________________________________________    Attending Physician: Warden Sagar MD

## 2021-05-30 NOTE — PROGRESS NOTES
5/30/2021  8:38 AM  Case management note    Reason for Admission:  Femur and humerus fracture   Patient had a GLF resulting in femur and shoulder fracture. Prior to admission patient was able to drive, used a cane and . Patient has history of HLD, HTN and seizure disorder. [facility] [place]              RUR Score:          7%           Plan for utilizing home health:      PT/OT to eval    PCP: First and Last name:  Anita Zeng MD     Name of Practice:    Are you a current patient: Yes/No:    Approximate date of last visit:    Can you participate in a virtual visit with your PCP:                     Current Advanced Directive/Advance Care Plan: Full Code  No AD    Healthcare Decision MakerTelford Farheen [phone] spouse                    Transition of Care Plan:                      1. Home with family assistance  2. PCP follow up  3. Ortho follow up  4. AD planning  5. CM to follow for discharge planning    Care Management Interventions  Mode of Transport at Discharge:  Other (see comment)  Current Support Network: Lives with Spouse  Confirm Follow Up Transport: Family  The Plan for Transition of Care is Related to the Following Treatment Goals : rt distal femur fracture and humerous fracture  Discharge Location  Discharge Placement: Home with family assistance  Laura Navarrete

## 2021-05-30 NOTE — PROGRESS NOTES
ORTHO DAILY PROGRESS NOTE     ASSESSMENT / PLAN :   1. Pain Control : Acceptable - Some pain at times, but the patient does not wish to increase their medications  2. Overnight Issues : none reported  3. Wound or incisional issue : Healing incision with no visible drainage  4. Therapy / Weight Bearing Recommendations : TTWB RLE with use of a cane in left hand and two person assist while mobilizing. NWB RUE secondary to humerus fracture. 5. DVT Prophylaxis : Mechanical and Lovenox and mechanical lower extremity compression device  6. Disposition : [facility] with daily rehab  7. Medical Concerns : Stable - Seizure disorder, continue PTA phenobarbital  8. Comments :  No x-rays available for review - Post-op right femur films ordered. HgB 6.4. Dr. Sr has ordered transfusion 1 unit PRBCs - Dr. Isrrael Newton agrees with plan for transfusion. POD  1 Day Post-Op s/p Procedure(s):  ORIF RIGHT DISTAL FEMUR      SUBJECTIVE :     Patient notes the following issues : Right arm pain (humerus fx being treated non-op). Patient reports very little discomfort in the operative leg (R). OBJECTIVE :     Patient Vitals for the past 12 hrs:   BP Temp Pulse Resp SpO2   05/30/21 1139 128/61 98.8 °F (37.1 °C) (!) 107 16 93 %   05/30/21 0917 (!) 115/51 98.8 °F (37.1 °C) (!) 107 16 94 %   05/30/21 0744     95 %   05/30/21 0700   (!) 104     05/30/21 0404 116/69 (!) 100.8 °F (38.2 °C) 97 20    05/30/21 0206   84         Alert and oriented x3. Examination of the right leg reveals that the dressing is clean, dry and intact. Motor Exam is intact and normal  Sensation is intact to light touch. No calf pain. Patient complains of right upper arm pain w/ TTP. No AROM - Sling for comfort - Remain NWB RUE.     Labs:  Recent Labs     05/30/21  1016 05/30/21  0040   HGB 6.4*  --    HCT 19.6*  --    NA  --  136   K  --  4.1   CL  --  106   CO2  --  25   BUN  --  14   CREA  --  0.61   GLU  --  118* CULTURES :  No results found for: SDES  No results found for: CULT       Patient mobility           Chuy Salguero MD  Cell [phone]  Noelle Alonso  Cell [phone]  Medical Assistants: Estevan1 Niecy Walden [phone]                 Surgery Scheduler: RAFAELA Palomo [phone] ext [phone]

## 2021-05-30 NOTE — PROGRESS NOTES
Problem: Falls - Risk of  Goal: *Absence of Falls  Description: Document Derrek Christy Fall Risk and appropriate interventions in the flowsheet. Outcome: Progressing Towards Goal  Note: Fall Risk Interventions:  Mobility Interventions: Bed/chair exit alarm, Patient to call before getting OOB         Medication Interventions: Bed/chair exit alarm, Teach patient to arise slowly, Utilize gait belt for transfers/ambulation, Patient to call before getting OOB    Elimination Interventions: Bed/chair exit alarm, Call light in reach, Patient to call for help with toileting needs    History of Falls Interventions: Bed/chair exit alarm, Utilize gait belt for transfer/ambulation         Problem: Patient Education: Go to Patient Education Activity  Goal: Patient/Family Education  Outcome: Progressing Towards Goal     Problem: Pressure Injury - Risk of  Goal: *Prevention of pressure injury  Description: Document Elbert Scale and appropriate interventions in the flowsheet.   Outcome: Progressing Towards Goal  Note: Pressure Injury Interventions:  Sensory Interventions: Assess changes in LOC, Float heels, Keep linens dry and wrinkle-free    Moisture Interventions: Absorbent underpads, Check for incontinence Q2 hours and as needed    Activity Interventions: PT/OT evaluation, Increase time out of bed    Mobility Interventions: Float heels, HOB 30 degrees or less, PT/OT evaluation    Nutrition Interventions: Document food/fluid/supplement intake, Offer support with meals,snacks and hydration    Friction and Shear Interventions: HOB 30 degrees or less, Minimize layers, Transferring/repositioning devices                Problem: Patient Education: Go to Patient Education Activity  Goal: Patient/Family Education  Outcome: Progressing Towards Goal

## 2021-05-30 NOTE — ROUTINE PROCESS
TRANSFER - OUT REPORT: 
 
Verbal report given to Yakelin Luciano RN(name) on Siva Batch  being transferred to [facility](unit) for routine post - op Report consisted of patients Situation, Background, Assessment and  
Recommendations(SBAR). Information from the following report(s) SBAR and MAR was reviewed with the receiving nurse. Opportunity for questions and clarification was provided. Patient transported with: 
 Registered Nurse

## 2021-05-30 NOTE — PROGRESS NOTES
Paged Ortho On Call-patient's hemoglobin is 6.5 and temp at 100.8. Ortho on call was paged at 0225 with no return call.      0427: Ortho on call paged again

## 2021-05-31 LAB
ABO + RH BLD: NORMAL
ALBUMIN SERPL-MCNC: 3 G/DL (ref 3.5–5)
ALBUMIN/GLOB SERPL: 0.8 {RATIO} (ref 1.1–2.2)
ALP SERPL-CCNC: 194 U/L (ref 45–117)
ALT SERPL-CCNC: 45 U/L (ref 12–78)
ANION GAP SERPL CALC-SCNC: 8 MMOL/L (ref 5–15)
AST SERPL-CCNC: 214 U/L (ref 15–37)
BASOPHILS # BLD: 0 K/UL (ref 0–0.1)
BASOPHILS NFR BLD: 0 % (ref 0–1)
BILIRUB SERPL-MCNC: 0.6 MG/DL (ref 0.2–1)
BLD PROD TYP BPU: NORMAL
BLOOD GROUP ANTIBODIES SERPL: NORMAL
BPU ID: NORMAL
BUN SERPL-MCNC: 12 MG/DL (ref 6–20)
BUN/CREAT SERPL: 20 (ref 12–20)
CALCIUM SERPL-MCNC: 7.8 MG/DL (ref 8.5–10.1)
CHLORIDE SERPL-SCNC: 106 MMOL/L (ref 97–108)
CO2 SERPL-SCNC: 23 MMOL/L (ref 21–32)
CREAT SERPL-MCNC: 0.6 MG/DL (ref 0.55–1.02)
CROSSMATCH RESULT,%XM: NORMAL
DIFFERENTIAL METHOD BLD: ABNORMAL
EOSINOPHIL # BLD: 0 K/UL (ref 0–0.4)
EOSINOPHIL NFR BLD: 0 % (ref 0–7)
ERYTHROCYTE [DISTWIDTH] IN BLOOD BY AUTOMATED COUNT: 15.1 % (ref 11.5–14.5)
GLOBULIN SER CALC-MCNC: 3.6 G/DL (ref 2–4)
GLUCOSE SERPL-MCNC: 119 MG/DL (ref 65–100)
HCT VFR BLD AUTO: 24.6 % (ref 35–47)
HGB BLD-MCNC: 8 G/DL (ref 11.5–16)
IMM GRANULOCYTES # BLD AUTO: 0.1 K/UL (ref 0–0.04)
IMM GRANULOCYTES NFR BLD AUTO: 1 % (ref 0–0.5)
LYMPHOCYTES # BLD: 2 K/UL (ref 0.8–3.5)
LYMPHOCYTES NFR BLD: 15 % (ref 12–49)
MAGNESIUM SERPL-MCNC: 2.3 MG/DL (ref 1.6–2.4)
MCH RBC QN AUTO: 30.4 PG (ref 26–34)
MCHC RBC AUTO-ENTMCNC: 32.5 G/DL (ref 30–36.5)
MCV RBC AUTO: 93.5 FL (ref 80–99)
MONOCYTES # BLD: 1.2 K/UL (ref 0–1)
MONOCYTES NFR BLD: 9 % (ref 5–13)
NEUTS SEG # BLD: 10 K/UL (ref 1.8–8)
NEUTS SEG NFR BLD: 75 % (ref 32–75)
NRBC # BLD: 0.03 K/UL (ref 0–0.01)
NRBC BLD-RTO: 0.2 PER 100 WBC
PLATELET # BLD AUTO: 197 K/UL (ref 150–400)
PMV BLD AUTO: 10.8 FL (ref 8.9–12.9)
POTASSIUM SERPL-SCNC: 3.4 MMOL/L (ref 3.5–5.1)
PROT SERPL-MCNC: 6.6 G/DL (ref 6.4–8.2)
RBC # BLD AUTO: 2.63 M/UL (ref 3.8–5.2)
SODIUM SERPL-SCNC: 137 MMOL/L (ref 136–145)
SPECIMEN EXP DATE BLD: NORMAL
STATUS OF UNIT,%ST: NORMAL
UNIT DIVISION, %UDIV: 0
WBC # BLD AUTO: 13.3 K/UL (ref 3.6–11)

## 2021-05-31 PROCEDURE — 80053 COMPREHEN METABOLIC PANEL: CPT

## 2021-05-31 PROCEDURE — 74011250637 HC RX REV CODE- 250/637: Performed by: INTERNAL MEDICINE

## 2021-05-31 PROCEDURE — 97165 OT EVAL LOW COMPLEX 30 MIN: CPT

## 2021-05-31 PROCEDURE — 74011250637 HC RX REV CODE- 250/637: Performed by: ORTHOPAEDIC SURGERY

## 2021-05-31 PROCEDURE — 74011000250 HC RX REV CODE- 250: Performed by: ORTHOPAEDIC SURGERY

## 2021-05-31 PROCEDURE — 74011250636 HC RX REV CODE- 250/636: Performed by: ORTHOPAEDIC SURGERY

## 2021-05-31 PROCEDURE — 77030038269 HC DRN EXT URIN PURWCK BARD -A

## 2021-05-31 PROCEDURE — 85025 COMPLETE CBC W/AUTO DIFF WBC: CPT

## 2021-05-31 PROCEDURE — 2709999900 HC NON-CHARGEABLE SUPPLY

## 2021-05-31 PROCEDURE — 97116 GAIT TRAINING THERAPY: CPT

## 2021-05-31 PROCEDURE — 94640 AIRWAY INHALATION TREATMENT: CPT

## 2021-05-31 PROCEDURE — 97530 THERAPEUTIC ACTIVITIES: CPT

## 2021-05-31 PROCEDURE — 65660000000 HC RM CCU STEPDOWN

## 2021-05-31 PROCEDURE — 97535 SELF CARE MNGMENT TRAINING: CPT

## 2021-05-31 PROCEDURE — 74011000250 HC RX REV CODE- 250: Performed by: INTERNAL MEDICINE

## 2021-05-31 PROCEDURE — 94760 N-INVAS EAR/PLS OXIMETRY 1: CPT

## 2021-05-31 PROCEDURE — 97161 PT EVAL LOW COMPLEX 20 MIN: CPT

## 2021-05-31 PROCEDURE — 83735 ASSAY OF MAGNESIUM: CPT

## 2021-05-31 PROCEDURE — 36415 COLL VENOUS BLD VENIPUNCTURE: CPT

## 2021-05-31 RX ORDER — ACETAMINOPHEN 325 MG/1
650 TABLET ORAL EVERY 6 HOURS
Status: DISCONTINUED | OUTPATIENT
Start: 2021-05-31 | End: 2021-06-05 | Stop reason: HOSPADM

## 2021-05-31 RX ORDER — FAMOTIDINE 20 MG/1
20 TABLET, FILM COATED ORAL 2 TIMES DAILY
Status: DISCONTINUED | OUTPATIENT
Start: 2021-05-31 | End: 2021-06-05 | Stop reason: HOSPADM

## 2021-05-31 RX ORDER — ENOXAPARIN SODIUM 100 MG/ML
40 INJECTION SUBCUTANEOUS DAILY
Status: DISCONTINUED | OUTPATIENT
Start: 2021-05-31 | End: 2021-05-31

## 2021-05-31 RX ORDER — POTASSIUM CHLORIDE 750 MG/1
40 TABLET, FILM COATED, EXTENDED RELEASE ORAL
Status: COMPLETED | OUTPATIENT
Start: 2021-05-31 | End: 2021-05-31

## 2021-05-31 RX ORDER — OXYCODONE HYDROCHLORIDE 5 MG/1
10 TABLET ORAL
Status: DISCONTINUED | OUTPATIENT
Start: 2021-05-31 | End: 2021-06-05 | Stop reason: HOSPADM

## 2021-05-31 RX ORDER — NALOXONE HYDROCHLORIDE 0.4 MG/ML
0.4 INJECTION, SOLUTION INTRAMUSCULAR; INTRAVENOUS; SUBCUTANEOUS AS NEEDED
Status: DISCONTINUED | OUTPATIENT
Start: 2021-05-31 | End: 2021-06-05 | Stop reason: HOSPADM

## 2021-05-31 RX ORDER — SODIUM CHLORIDE 0.9 % (FLUSH) 0.9 %
5-40 SYRINGE (ML) INJECTION AS NEEDED
Status: DISCONTINUED | OUTPATIENT
Start: 2021-05-31 | End: 2021-06-05 | Stop reason: HOSPADM

## 2021-05-31 RX ORDER — CELECOXIB 100 MG/1
200 CAPSULE ORAL 2 TIMES DAILY
Status: DISCONTINUED | OUTPATIENT
Start: 2021-05-31 | End: 2021-06-05 | Stop reason: HOSPADM

## 2021-05-31 RX ORDER — OXYCODONE HYDROCHLORIDE 5 MG/1
5 TABLET ORAL
Status: DISCONTINUED | OUTPATIENT
Start: 2021-05-31 | End: 2021-06-05 | Stop reason: HOSPADM

## 2021-05-31 RX ORDER — SODIUM CHLORIDE 0.9 % (FLUSH) 0.9 %
5-40 SYRINGE (ML) INJECTION EVERY 8 HOURS
Status: DISCONTINUED | OUTPATIENT
Start: 2021-05-31 | End: 2021-06-05 | Stop reason: HOSPADM

## 2021-05-31 RX ORDER — HYDROMORPHONE HYDROCHLORIDE 1 MG/ML
0.5 INJECTION, SOLUTION INTRAMUSCULAR; INTRAVENOUS; SUBCUTANEOUS
Status: ACTIVE | OUTPATIENT
Start: 2021-05-31 | End: 2021-06-01

## 2021-05-31 RX ORDER — ASPIRIN 81 MG/1
81 TABLET ORAL 2 TIMES DAILY
Status: DISCONTINUED | OUTPATIENT
Start: 2021-05-31 | End: 2021-06-02

## 2021-05-31 RX ORDER — ONDANSETRON 2 MG/ML
4 INJECTION INTRAMUSCULAR; INTRAVENOUS
Status: DISCONTINUED | OUTPATIENT
Start: 2021-05-31 | End: 2021-05-31

## 2021-05-31 RX ORDER — FACIAL-BODY WIPES
10 EACH TOPICAL DAILY PRN
Status: DISCONTINUED | OUTPATIENT
Start: 2021-06-02 | End: 2021-06-05 | Stop reason: HOSPADM

## 2021-05-31 RX ORDER — POLYETHYLENE GLYCOL 3350 17 G/17G
17 POWDER, FOR SOLUTION ORAL DAILY
Status: DISCONTINUED | OUTPATIENT
Start: 2021-05-31 | End: 2021-06-05 | Stop reason: HOSPADM

## 2021-05-31 RX ORDER — AMOXICILLIN 250 MG
1 CAPSULE ORAL 2 TIMES DAILY
Status: DISCONTINUED | OUTPATIENT
Start: 2021-05-31 | End: 2021-06-05 | Stop reason: HOSPADM

## 2021-05-31 RX ORDER — DIPHENHYDRAMINE HYDROCHLORIDE 50 MG/ML
12.5 INJECTION, SOLUTION INTRAMUSCULAR; INTRAVENOUS
Status: ACTIVE | OUTPATIENT
Start: 2021-05-31 | End: 2021-06-01

## 2021-05-31 RX ADMIN — ENOXAPARIN SODIUM 40 MG: 40 INJECTION SUBCUTANEOUS at 08:52

## 2021-05-31 RX ADMIN — FAMOTIDINE 20 MG: 20 TABLET ORAL at 08:52

## 2021-05-31 RX ADMIN — Medication 10 ML: at 21:09

## 2021-05-31 RX ADMIN — AMLODIPINE BESYLATE 5 MG: 5 TABLET ORAL at 17:12

## 2021-05-31 RX ADMIN — PHENOBARBITAL 129.6 MG: 32.4 TABLET ORAL at 21:10

## 2021-05-31 RX ADMIN — ACETAMINOPHEN 650 MG: 325 TABLET ORAL at 09:00

## 2021-05-31 RX ADMIN — Medication 81 MG: at 08:52

## 2021-05-31 RX ADMIN — ACETAMINOPHEN 650 MG: 325 TABLET ORAL at 21:09

## 2021-05-31 RX ADMIN — CELECOXIB 200 MG: 100 CAPSULE ORAL at 08:52

## 2021-05-31 RX ADMIN — LISINOPRIL 40 MG: 20 TABLET ORAL at 17:12

## 2021-05-31 RX ADMIN — FAMOTIDINE 20 MG: 20 TABLET ORAL at 17:12

## 2021-05-31 RX ADMIN — Medication 10 ML: at 08:53

## 2021-05-31 RX ADMIN — ARFORMOTEROL TARTRATE: 15 SOLUTION RESPIRATORY (INHALATION) at 19:41

## 2021-05-31 RX ADMIN — ACETAMINOPHEN 650 MG: 325 TABLET ORAL at 04:33

## 2021-05-31 RX ADMIN — ACETAMINOPHEN 650 MG: 325 TABLET ORAL at 17:12

## 2021-05-31 RX ADMIN — Medication 10 ML: at 11:33

## 2021-05-31 RX ADMIN — POTASSIUM CHLORIDE 40 MEQ: 750 TABLET, EXTENDED RELEASE ORAL at 11:32

## 2021-05-31 RX ADMIN — ARFORMOTEROL TARTRATE: 15 SOLUTION RESPIRATORY (INHALATION) at 07:32

## 2021-05-31 RX ADMIN — POLYETHYLENE GLYCOL 3350 17 G: 17 POWDER, FOR SOLUTION ORAL at 08:52

## 2021-05-31 RX ADMIN — DOCUSATE SODIUM 50MG AND SENNOSIDES 8.6MG 1 TABLET: 8.6; 5 TABLET, FILM COATED ORAL at 08:52

## 2021-05-31 RX ADMIN — Medication 81 MG: at 17:13

## 2021-05-31 RX ADMIN — CELECOXIB 200 MG: 100 CAPSULE ORAL at 17:12

## 2021-05-31 RX ADMIN — Medication 10 ML: at 05:07

## 2021-05-31 NOTE — PROGRESS NOTES
ORIF DISTAL FEMUR DAILY NOTE     ASSESSMENT / PLAN :   1. Pain Control : Good  2. Overnight Issues : none  3. Wound or incisional issue : WHSS, dressing C/D/I  4. Therapy / Weight Bearing Recommendations : TTWB R LE, NWB R UE  5. DVT Prophylaxis : Mechanical and Lovenox  6. Disposition : SNF  7. Medical Concerns : MMP, elevated BMI  8. Comments : Likely slow to mobilize due to NWB status. Pt may require further surgery if not progressing to union. Once her Prox humerus fracture has progressed to the point of weight bearing (2-4 weeks) she will likely have an easier time with mobility. Given the large bony void at the site of the fracture, I would consider a DFR at 6 weeks if she is not demonstrating callus formation. POD  2 Days Post-Op s/p Procedure(s):  RIGHT DISTAL FEMUR OPEN REDUCTION INTERNAL FIXATION     SUBJECTIVE :     Overnight events, issues noted by the patient or family : none. Dementia is not present. OBJECTIVE :     Patient Vitals for the past 12 hrs:   BP Temp Pulse Resp SpO2   05/31/21 0733     94 %   05/31/21 0700   87     05/31/21 0334 106/70 99.4 °F (37.4 °C) 97 16 93 %   05/30/21 2302   93     05/30/21 2259 115/70 99.6 °F (37.6 °C) 97 16 91 %       Alert and oriented x 3. Examination of the right thigh reveals that the dressing is C/D/I  Motor Exam is intact and normal  Sensation is intact to light touch. No calf pain.      Labs:  Recent Labs     05/31/21  0115   HGB 8.0*   HCT 24.6*      K 3.4*      CO2 23   BUN 12   CREA 0.60   *      Patient mobility                                Eligio Gilmore MD  Cell [phone]  Davina Bello PA-C  Cell [phone]  Medical Staff : Cira Saavedra  Office : [phone]

## 2021-05-31 NOTE — PROGRESS NOTES
Problem: Mobility Impaired (Adult and Pediatric)  Goal: *Acute Goals and Plan of Care (Insert Text)  Note: FUNCTIONAL STATUS PRIOR TO ADMISSION: Patient was independent and active without use of DME.    HOME SUPPORT PRIOR TO ADMISSION: The patient lived with  but did not require assist.    Physical Therapy Goals  Initiated 5/31/2021  1. Patient will move from supine to sit and sit to supine  in bed with modified independence within 7 day(s). 2.  Patient will transfer from bed to chair and chair to bed with supervision/set-up using the least restrictive device within 7 day(s). 3.  Patient will perform sit to stand with supervision/set-up within 7 day(s). 4.  Patient will ambulate with minimal assistance/contact guard assist for 5 feet with the least restrictive device within 7 day(s). PHYSICAL THERAPY EVALUATION  Patient: Florecita Barahona (53 y.o. female)  Date: 5/31/2021  Primary Diagnosis: Humerus fracture [S42.309A]  Procedure(s) (LRB):  RIGHT DISTAL FEMUR OPEN REDUCTION INTERNAL FIXATION (Right) 2 Days Post-Op   Precautions:   Fall (NWB RUE; TTWB RLE)    ASSESSMENT  Based on the objective data described below, the patient presents with decreased ROM and strength to RLE, decreased bed mobility, transfers and gait following admission for GLF resulting in fracture to right humerus and right distal femur. Patient is s/p ORIF to right distal femur POD #2 and right humeral fracture being treated conservatively with sling. Patient prefers to go home with HHPT. She will need a hemiwalker for home use. Patient has good support and adequate help at home, as well as ramp, Wheelchair, shower bench and BSC. Current Level of Function Impacting Discharge (mobility/balance): Educated patient and her  regarding TTWB on RLE and NWB to RLE. OT present and secured RUE in sling. Patient sat on edge of bed with min assist of 2.   She was able to transfer to [facility], then to recliner using hemwalker +2 min assist. She maintains TTWB on RLE. Vitals stable. Functional Outcome Measure: The patient scored 40/100 on the Barthel outcome measure. Other factors to consider for discharge: will need 24/7 assist for safety     Patient will benefit from skilled therapy intervention to address the above noted impairments. PLAN :  Recommendations and Planned Interventions: bed mobility training, transfer training, gait training, and therapeutic exercises      Frequency/Duration: Patient will be followed by physical therapy:  daily to address goals. Recommendation for discharge: (in order for the patient to meet his/her long term goals)  To be determined: Patient preference is to go home with HHPT and 24/7 assist of family and friends    This discharge recommendation:  Has been made in collaboration with the attending provider and/or case management    IF patient discharges home will need the following DME: hemiwalker         SUBJECTIVE:   Patient stated It feels good to be out of that bed.     OBJECTIVE DATA SUMMARY:   HISTORY:    Past Medical History:   Diagnosis Date    Hyperlipidemia     Hypertension     Seizures ([facility] [address]      Past Surgical History:   Procedure Laterality Date    HX GYN      HX ORTHOPAEDIC         Personal factors and/or comorbidities impacting plan of care: NWB to RUE; TTWB RLE, fall risk    Home Situation  Home Environment: Private residence  # Steps to Enter: 6  Wheelchair Ramp: Yes  One/Two Story Residence: One story  Living Alone: No  Support Systems: Spouse/Significant Other/Partner, Family member(s), Friends \ neighbors  Patient Expects to be Discharged to[de-identified] Private residence  Current DME Used/Available at Home: Wheelchair, Walker, rollator, Commode, bedside, Cane, straight, Transfer bench, Lift chair    EXAMINATION/PRESENTATION/DECISION MAKING:   Critical Behavior:  Neurologic State: Alert  Orientation Level: Oriented X4  Cognition: Appropriate decision making, Appropriate for age attention/concentration, Appropriate safety awareness, Follows commands  Safety/Judgement: Insight into deficits, Good awareness of safety precautions  Hearing: Auditory  Auditory Impairment: None  Skin:  not fully observed    Range Of Motion:  AROM: Generally decreased, functional (LUE and LLE functional.  RUE in sling; decreased ROM to RLE due to surgery)                       Strength:    Strength: Generally decreased, functional                    Tone & Sensation:   Tone: Normal              Sensation: Intact                       Functional Mobility:  Bed Mobility:     Supine to Sit: Additional time;Assist x2; Moderate assistance     Scooting: Moderate assistance  Transfers:  Sit to Stand: Assist x2; Additional time; Moderate assistance  Stand to Sit: Assist x2;Minimum assistance        Bed to Chair: Assist x2; Moderate assistance              Balance:   Sitting: Intact  Standing: Impaired  Standing - Static: Constant support  Standing - Dynamic : Fair  Ambulation/Gait Training:  Distance (ft): 3 Feet (ft)  Assistive Device: Gait belt;Walker pia (sling to RUE)  Ambulation - Level of Assistance: Assist x2;Minimal assistance        Gait Abnormalities: Decreased step clearance; Step to gait  Right Side Weight Bearing: Toe touch     Base of Support: Widened  Stance: Right decreased  Speed/Laurence: Slow  Step Length: Right shortened;Left shortened                                     Functional Measure:  Barthel Index:    Bathin  Bladder: 5  Bowels: 10  Groomin  Dressin  Feedin  Mobility: 0  Stairs: 0  Toilet Use: 5  Transfer (Bed to Chair and Back): 5  Total: 40/100       The Barthel ADL Index: Guidelines  1. The index should be used as a record of what a patient does, not as a record of what a patient could do. 2. The main aim is to establish degree of independence from any help, physical or verbal, however minor and for whatever reason.   3. The need for supervision renders the patient not independent. 4. A patient's performance should be established using the best available evidence. Asking the patient, friends/relatives and nurses are the usual sources, but direct observation and common sense are also important. However direct testing is not needed. 5. Usually the patient's performance over the preceding 24-48 hours is important, but occasionally longer periods will be relevant. 6. Middle categories imply that the patient supplies over 50 per cent of the effort. 7. Use of aids to be independent is allowed. Naldo Regalado., Barthel, DSharronW. (3580). Functional evaluation: the Barthel Index. [address] [facility] (14)2. Saranya Arthur viridiana RAYA Hare, Lulu Reich., Semaj Li., Haris, [facility] Kennedy Spencer (1999). Measuring the change indisability after inpatient rehabilitation; comparison of the responsiveness of the Barthel Index and Functional [place] Measure. Journal of Neurology, Neurosurgery, and Psychiatry, 66(4), 638-041. ARMANI DelacruzJ.MARYCARMEN, YOKASTA Perez, & Fiona Cuba MAMARILIS. (2004.) Assessment of post-stroke quality of life in cost-effectiveness studies: The usefulness of the Barthel Index and the EuroQoL-5D.  Quality of Life Research, 15, 231-55           Physical Therapy Evaluation Charge Determination   History Examination Presentation Decision-Making   MEDIUM  Complexity : 1-2 comorbidities / personal factors will impact the outcome/ POC  LOW Complexity : 1-2 Standardized tests and measures addressing body structure, function, activity limitation and / or participation in recreation  LOW Complexity : Stable, uncomplicated  Other outcome measures Barthel  MEDIUM      Based on the above components, the patient evaluation is determined to be of the following complexity level: LOW     Pain Rating:  None at this time    Activity Tolerance:   Good    After treatment patient left in no apparent distress:   Sitting in chair, Call bell within reach, Bed / chair alarm activated, and Caregiver / family present    COMMUNICATION/EDUCATION:   The patients plan of care was discussed with: Occupational therapist, Registered nurse, and Physician. Fall prevention education was provided and the patient/caregiver indicated understanding. and Patient/family agree to work toward stated goals and plan of care.     Thank you for this referral.  Tej Kilgore, PT   Time Calculation: 40 mins

## 2021-05-31 NOTE — PROGRESS NOTES
Bhavin Ndiaye [facility] [address]  [address] [facility] [place], [place], [address] [place] [address]  [phone]      Medical Progress Note      NAME: Angie Priest   :  1955  MRM:  [id number]    Date of service: 2021  3:09 PM       Assessment and Plan:     Right femoral fracture: evaluated by orthopedics and had ORIF on . Oxycodone Prn and IV dilaudid PRN for pain and monitor for sedation. Right Humerus fracture (2021). Non operative management for now. Sling. NWB RUE. Oxycodone Prn and IV dilaudid PRN for pain and monitor for sedation. Anemia. Significant drop from baseline. Likely blood loss from surgery. transfuse as needed to keep Hb > 7. Transaminitis. Resolved. ABD US showed Cholelithiasis. No evidence of acute cholecystitis. acute hepatitis panel is negative. Monitor      Seizure disorder ([facility] [address] (2021): Continue home phenobarbital.     HTN (hypertension) (2021): Continue home amlodipine, lisinopril.      HLD (hyperlipidemia) (2021): Not on home statin. Subjective:     Chief Complaint[de-identified] Patient was seen and examined as a follow up for humeral and femoral fracture. Chart was reviewed. States pain controlled. ROS:  (bold if positive, if negative)    Tolerating PT  Tolerating Diet        Objective:     Last 24hrs VS reviewed since prior progress note.  Most recent are:    Visit Vitals  /65 (BP 1 Location: Left lower arm, BP Patient Position: At rest)   Pulse (!) 101   Temp 98.4 °F (36.9 °C)   Resp 16   Ht 5' 3\" (1.6 m)   Wt 90.7 kg (200 lb)   SpO2 97%   BMI 35.43 kg/m²     SpO2 Readings from Last 6 Encounters:   21 97%    O2 Flow Rate (L/min): 2 l/min       Intake/Output Summary (Last 24 hours) at 2021 1509  Last data filed at 2021 1438  Gross per 24 hour   Intake 1145 ml   Output 1100 ml   Net 45 ml        Physical Exam:    Gen:  Well-developed, well-nourished, in no acute distress  HEENT:  Pink conjunctivae, PERRL, hearing intact to voice, moist mucous membranes  Neck:  Supple, without masses, thyroid non-tender  Resp:  No accessory muscle use, clear breath sounds without wheezes rales or rhonchi  Card:  No murmurs, normal S1, S2 without thrills, bruits or peripheral edema  Abd:  Soft, non-tender, non-distended, normoactive bowel sounds are present, no palpable organomegaly and no detectable hernias  Lymph:  No cervical or inguinal adenopathy  Musc:  No cyanosis or clubbing  Skin:  No rashes or ulcers, skin turgor is good  Neuro:  Cranial nerves are grossly intact, no focal motor weakness, follows commands appropriately  Psych:  Good insight, oriented to person, place and time, alert  __________________________________________________________________  Medications Reviewed: (see below)  Medications:     Current Facility-Administered Medications   Medication Dose Route Frequency    sodium chloride 0.9 % bolus infusion 500 mL  500 mL IntraVENous ONCE PRN    sodium chloride (NS) flush 5-40 mL  5-40 mL IntraVENous Q8H    sodium chloride (NS) flush 5-40 mL  5-40 mL IntraVENous PRN    acetaminophen (TYLENOL) tablet 650 mg  650 mg Oral Q6H    oxyCODONE IR (ROXICODONE) tablet 5 mg  5 mg Oral Q3H PRN    oxyCODONE IR (ROXICODONE) tablet 10 mg  10 mg Oral Q3H PRN    celecoxib (CELEBREX) capsule 200 mg  200 mg Oral BID    HYDROmorphone (DILAUDID) syringe 0.5 mg  0.5 mg IntraVENous Q4H PRN    naloxone (NARCAN) injection 0.4 mg  0.4 mg IntraVENous PRN    diphenhydrAMINE (BENADRYL) injection 12.5 mg  12.5 mg IntraVENous Q6H PRN    famotidine (PEPCID) tablet 20 mg  20 mg Oral BID    senna-docusate (PERICOLACE) 8.6-50 mg per tablet 1 Tablet  1 Tablet Oral BID    polyethylene glycol (MIRALAX) packet 17 g  17 g Oral DAILY    [START ON 6/2/2021] bisacodyL (DULCOLAX) suppository 10 mg  10 mg Rectal DAILY PRN    aspirin delayed-release tablet 81 mg  81 mg Oral BID    0.9% sodium chloride infusion 250 mL  250 mL IntraVENous PRN    sodium chloride (NS) flush 5-40 mL  5-40 mL IntraVENous Q8H    sodium chloride (NS) flush 5-40 mL  5-40 mL IntraVENous PRN    acetaminophen (TYLENOL) tablet 650 mg  650 mg Oral Q6H PRN    Or    acetaminophen (TYLENOL) suppository 650 mg  650 mg Rectal Q6H PRN    polyethylene glycol (MIRALAX) packet 17 g  17 g Oral DAILY PRN    promethazine (PHENERGAN) tablet 12.5 mg  12.5 mg Oral Q6H PRN    Or    ondansetron (ZOFRAN) injection 4 mg  4 mg IntraVENous Q6H PRN    enoxaparin (LOVENOX) injection 40 mg  40 mg SubCUTAneous DAILY    amLODIPine (NORVASC) tablet 5 mg  5 mg Oral QPM    albuterol (PROVENTIL VENTOLIN) nebulizer solution 2.5 mg  2.5 mg Nebulization Q4H PRN    PHENobarbitaL (LUMINAL) tablet 129.6 mg  129.6 mg Oral QHS    lisinopriL (PRINIVIL, ZESTRIL) tablet 40 mg  40 mg Oral QPM    arformoterol 15 mcg/budesonide 0.5 mg neb solution   Nebulization BID RT        Lab Data Reviewed: (see below)  Lab Review:     Recent Labs     05/31/21  0115 05/30/21  1016 05/30/21  0404 05/29/21  0254   WBC 13.3*  --  11.9* 10.1   HGB 8.0* 6.4* 6.5* 9.2*   HCT 24.6* 19.6* 20.3* 28.0*     --  199 297     Recent Labs     05/31/21  0115 05/30/21  0040 05/29/21  0254    136 134*   K 3.4* 4.1 3.7    106 102   CO2 23 25 25   * 118* 132*   BUN 12 14 10   CREA 0.60 0.61 0.59   CA 7.8* 8.0* 8.5   MG 2.3 1.9 2.1   ALB 3.0* 3.6 3.4*   TBILI 0.6 0.4 0.4   ALT 45 44 68     No results found for: GLUCPOC  No results for input(s): PH, PCO2, PO2, HCO3, FIO2 in the last 72 hours. No results for input(s): INR, INREXT, INREXT in the last 72 hours.   All Micro Results     Procedure Component Value Units Date/Time    COVID-19 RAPID TEST [[id number]] Collected: 05/28/21 2631    Order Status: Completed Specimen: Nasopharyngeal Updated: 05/29/21 0043     Specimen source Nasopharyngeal        COVID-19 rapid test Not detected        Comment: Rapid Abbott ID Now       Rapid NAAT:  The specimen is NEGATIVE for SARS-CoV-2, the novel coronavirus associated with COVID-19. Negative results should be treated as presumptive and, if inconsistent with clinical signs and symptoms or necessary for patient management, should be tested with an alternative molecular assay. Negative results do not preclude SARS-CoV-2 infection and should not be used as the sole basis for patient management decisions. This test has been authorized by the FDA under an Emergency Use Authorization (EUA) for use by authorized laboratories. Fact sheet for Healthcare Providers: [employer]  Fact sheet for Patients: [employer]       Methodology: Isothermal Nucleic Acid Amplification               I have reviewed notes of prior 24hr. Other pertinent lab: Total time spent with patient: 28 I personally reviewed chart, notes, data and current medications in the medical record. I have personally examined and treated the patient at bedside during this period.                  Care Plan discussed with: Patient, Family, Nursing Staff and >50% of time spent in counseling and coordination of care    Discussed:  Care Plan    Prophylaxis:  Lovenox    Disposition:  SNF/LTC versus HH            ___________________________________________________    Attending Physician: Yuly Bridges DO

## 2021-05-31 NOTE — PROGRESS NOTES
Problem: Self Care Deficits Care Plan (Adult)  Goal: *Acute Goals and Plan of Care (Insert Text)  Description:   FUNCTIONAL STATUS PRIOR TO ADMISSION: Patient was independent and active without use of DME. Patient was independent for basic and instrumental ADLs. HOME SUPPORT: The patient lived with  but did not require assist.    Occupational Therapy Goals  Initiated 5/31/2021  1. Patient will perform grooming, seated, with supervision/set-up using R UE as fine motor assist within 7 day(s). 2.  Patient will perform upper body ADLs with minimal assistance/contact guard assist within 7 day(s). 3.  Patient will don/doff arm sling at minimal assistance/contact guard assist level within 7 days. 4.  Patient will perform toilet transfers to [facility] with minimal assistance/contact guard assist within 7 day(s). 5.  Patient will perform all aspects of toileting with minimal assistance within 7 day(s). 6.  Patient will utilize energy conservation techniques during functional activities with verbal cues within 7 day(s). Outcome: Progressing Towards Goal     OCCUPATIONAL THERAPY EVALUATION  Patient: Shubham Kim (89 y.o. female)  Date: 5/31/2021  Primary Diagnosis: Humerus fracture [S42.309A]  Procedure(s) (LRB):  RIGHT DISTAL FEMUR OPEN REDUCTION INTERNAL FIXATION (Right) 2 Days Post-Op   Precautions:  Fall (NWB RUE; TTWB RLE)    ASSESSMENT  Based on the objective data described below, the patient presents with decreased ROM and strength to R LE, and new NWB to R UE impacting performance with ADLs and mobility following admission for GLF resulting in R humerus fx and R femur fx. Pt is POD 2 s/p ORIF of R distal femur and currently with conservative tx for R UE with sling use. Pt is alert, oriented, and motivated to participate with therapy. She offers excellent efforts and is receptive to education regarding R UE sling use and mgmt, ADL adaptations, pia-walker use for ADL transfers, and energy conservation. Pt able to perform SPT to [facility] with A x 2 with good maintenance of TTWB to R LE while using pia-walker. She requires max A to manage R sling and UB dressing and increased assistance for standing portions of ADL tasks. VSS throughout and pt is eager to return home at discharge where she will have good family support. Current Level of Function Impacting Discharge (ADLs/self-care): up to max A for UB ADLs and LB dressing; mod to max A for toileting    Functional Outcome Measure: The patient scored 40/100 on the Barthel outcome measure which is indicative of moderate functional impairment. Other factors to consider for discharge: NWB to R UE; TTWB to R LE; fall risk; good family support; motivated and eager to participate and improve function; good pain control; will require 24 hour assist for safety and physical assist for all ADLs     Patient will benefit from skilled therapy intervention to address the above noted impairments. PLAN :  Recommendations and Planned Interventions: self care training, functional mobility training, therapeutic exercise, balance training, therapeutic activities, endurance activities, patient education, home safety training and family training/education    Frequency/Duration: Patient will be followed by occupational therapy 5 times a week to address goals. Recommendation for discharge: (in order for the patient to meet his/her long term goals)  To be determined: pt preference to retuurn home with HHOT/PT and 24/7 Assist of family/friends; if 24 hour assist unavailable, she will require rehab    This discharge recommendation:  Has been made in collaboration with the attending provider and/or case management    IF patient discharges home will need the following DME: BSC, pia-walker, shower chair, 24 hour assistance       SUBJECTIVE:   Patient stated I know I need to get up and move around or this won't get better.     OBJECTIVE DATA SUMMARY:   HISTORY:   Past Medical History:   Diagnosis Date    Hyperlipidemia     Hypertension     Seizures (HCC)      Past Surgical History:   Procedure Laterality Date    HX GYN      HX ORTHOPAEDIC         Expanded or extensive additional review of patient history:     Home Situation  Home Environment: Private residence  # Steps to Enter: 6  Wheelchair Ramp: Yes  One/Two Story Residence: One story  Living Alone: No  Support Systems: Spouse/Significant Other/Partner, Family member(s), Friends \ neighbors  Patient Expects to be Discharged to[de-identified] Private residence  Current DME Used/Available at [facility]: Wheelchair, Walker, rollator, Commode, bedside, Cane, straight, Transfer bench, Lift chair    Hand dominance: Right    EXAMINATION OF PERFORMANCE DEFICITS:  Cognitive/Behavioral Status:  Neurologic State: Alert; Appropriate for age  Orientation Level: Oriented X4  Cognition: Follows commands; Appropriate decision making; Appropriate for age attention/concentration; Appropriate safety awareness  Perception: Appears intact  Perseveration: No perseveration noted  Safety/Judgement: Insight into deficits;Good awareness of safety precautions    Hearing: Auditory  Auditory Impairment: None    Range of Motion:  AROM: Generally decreased, functional (LUE and LLE functional.  RUE in sling; decreased ROM to RLE due to surgery)    Strength:  Strength: Generally decreased, functional    Coordination:  Fine Motor Skills-Upper: Left Intact; Right Intact    Gross Motor Skills-Upper: Left Intact    Tone & Sensation:  Tone: Normal  Sensation: Intact    Balance:  Sitting: Intact  Standing: Impaired  Standing - Static: Constant support  Standing - Dynamic : Fair    Functional Mobility and Transfers for ADLs:  Bed Mobility:  Supine to Sit: Additional time;Assist x2; Moderate assistance  Scooting: Moderate assistance    Transfers:  Sit to Stand: Assist x2; Additional time; Moderate assistance  Stand to Sit: Assist x2;Minimum assistance  Bed to Chair: Assist x2; Moderate assistance  Toilet Transfer : Moderate assistance;Assist x2 (for SPT to [facility] with pia walker)    ADL Assessment:  Feeding: Setup;Minimum assistance    Oral Facial Hygiene/Grooming: Setup;Minimum assistance    Bathing: Moderate assistance    Upper Body Dressing: Moderate assistance;Maximum assistance (including R sling mgmt)    Lower Body Dressing: Maximum assistance    Toileting: Moderate assistance;Maximum assistance    ADL Intervention and task modifications:  Patient instructed and demonstrated R UE NWB and R LE TTWB during ADLs with verbal cues. Patient instructed and indicated understanding dress R UE first/undress last. Patient instructed and indicated understanding of compensatory strategies to don sling. Upper Body Dressing Assistance  Orthotics(Brace): Maximum assistance; Compensatory technique training (to don R UE sling)  Hospital Gown: Maximum assistance; Compensatory technique training  Cues: Physical assistance; Tactile cues provided;Verbal cues provided;Visual cues provided    Lower Body Dressing Assistance  Socks: Total assistance (dependent)  Position Performed: Seated edge of bed  Cues: Don;Physical assistance    Toileting  Bladder Hygiene: Maximum assistance  Clothing Management: Maximum assistance  Cues: Physical assistance for pants down;Physical assistance for pants up; Tactile cues provided;Verbal cues provided;Visual cues provided (physical assist for hygiene)    Cognitive Retraining  Safety/Judgement: Insight into deficits;Good awareness of safety precautions    Functional Measure:  Barthel Index:    Bathin  Bladder: 5  Bowels: 10  Groomin  Dressin  Feedin  Mobility: 0  Stairs: 0  Toilet Use: 5  Transfer (Bed to Chair and Back): 5  Total: 40/100        The Barthel ADL Index: Guidelines  1. The index should be used as a record of what a patient does, not as a record of what a patient could do.   2. The main aim is to establish degree of independence from any help, physical or verbal, however minor and for whatever reason. 3. The need for supervision renders the patient not independent. 4. A patient's performance should be established using the best available evidence. Asking the patient, friends/relatives and nurses are the usual sources, but direct observation and common sense are also important. However direct testing is not needed. 5. Usually the patient's performance over the preceding 24-48 hours is important, but occasionally longer periods will be relevant. 6. Middle categories imply that the patient supplies over 50 per cent of the effort. 7. Use of aids to be independent is allowed. Curtis Montelongo., Barthel, D.W. (9643). Functional evaluation: the Barthel Index. [facility] (14)2. Eloisa Wiggins viridiana RAYA Hare, Florecita Delgado., Kane Jolly., [place], [address] [place] [address] (1999). Measuring the change indisability after inpatient rehabilitation; comparison of the responsiveness of the Barthel Index and Functional [place] Measure. Journal of Neurology, Neurosurgery, and Psychiatry, 66(4), 470-847. Shaina Cox, N.J.A, YOKASTA Perez, & Gerardo Verduzco MSharronA. (2004.) Assessment of post-stroke quality of life in cost-effectiveness studies: The usefulness of the Barthel Index and the EuroQoL-5D.  Quality of Life Research, 15, 370-72     Occupational Therapy Evaluation Charge Determination   History Examination Decision-Making   LOW Complexity : Brief history review  HIGH Complexity : 5 or more performance deficits relating to physical, cognitive , or psychosocial skils that result in activity limitations and / or participation restrictions LOW Complexity : No comorbidities that affect functional and no verbal or physical assistance needed to complete eval tasks       Based on the above components, the patient evaluation is determined to be of the following complexity level: LOW   Pain Rating:  Pt reporting minimal pain this session    Activity Tolerance:   Good    After treatment patient left in no apparent distress:    Sitting in chair, Call bell within reach, Bed / chair alarm activated and Caregiver / family present    COMMUNICATION/EDUCATION:   The patients plan of care was discussed with: Physical therapist and Registered nurse. Home safety education was provided and the patient/caregiver indicated understanding., Patient/family have participated as able in goal setting and plan of care. and Patient/family agree to work toward stated goals and plan of care. This patients plan of care is appropriate for delegation to [facility].     Thank you for this referral.  Cathy Conley OT  Time Calculation: 25 mins

## 2021-05-31 NOTE — PROGRESS NOTES
Problem: Falls - Risk of  Goal: *Absence of Falls  Description: Document Audrey Ruts Fall Risk and appropriate interventions in the flowsheet. Outcome: Progressing Towards Goal  Note: Fall Risk Interventions:  Mobility Interventions: Bed/chair exit alarm, Patient to call before getting OOB         Medication Interventions: Bed/chair exit alarm, Patient to call before getting OOB    Elimination Interventions: Bed/chair exit alarm, Call light in reach, Patient to call for help with toileting needs    History of Falls Interventions: Bed/chair exit alarm         Problem: Patient Education: Go to Patient Education Activity  Goal: Patient/Family Education  Outcome: Progressing Towards Goal     Problem: Pressure Injury - Risk of  Goal: *Prevention of pressure injury  Description: Document Elbert Scale and appropriate interventions in the flowsheet. Outcome: Progressing Towards Goal  Note: Pressure Injury Interventions:  Sensory Interventions: Assess changes in LOC, Float heels    Moisture Interventions: Absorbent underpads, Minimize layers    Activity Interventions: Increase time out of bed, PT/OT evaluation    Mobility Interventions: HOB 30 degrees or less, PT/OT evaluation, Turn and reposition approx.  every two hours(pillow and wedges)    Nutrition Interventions: Document food/fluid/supplement intake, Offer support with meals,snacks and hydration    Friction and Shear Interventions: HOB 30 degrees or less, Lift sheet                Problem: Patient Education: Go to Patient Education Activity  Goal: Patient/Family Education  Outcome: Progressing Towards Goal

## 2021-06-01 ENCOUNTER — APPOINTMENT (OUTPATIENT)
Dept: NON INVASIVE DIAGNOSTICS | Age: 66
DRG: 481 | End: 2021-06-01
Attending: INTERNAL MEDICINE
Payer: MEDICARE

## 2021-06-01 LAB
ANION GAP SERPL CALC-SCNC: 8 MMOL/L (ref 5–15)
ATRIAL RATE: 86 BPM
BASOPHILS # BLD: 0.1 K/UL (ref 0–0.1)
BASOPHILS NFR BLD: 1 % (ref 0–1)
BUN SERPL-MCNC: 13 MG/DL (ref 6–20)
BUN/CREAT SERPL: 24 (ref 12–20)
CALCIUM SERPL-MCNC: 8.3 MG/DL (ref 8.5–10.1)
CALCULATED P AXIS, ECG09: 79 DEGREES
CALCULATED R AXIS, ECG10: 41 DEGREES
CALCULATED T AXIS, ECG11: 33 DEGREES
CHLORIDE SERPL-SCNC: 105 MMOL/L (ref 97–108)
CO2 SERPL-SCNC: 22 MMOL/L (ref 21–32)
CREAT SERPL-MCNC: 0.54 MG/DL (ref 0.55–1.02)
DIAGNOSIS, 93000: NORMAL
DIFFERENTIAL METHOD BLD: ABNORMAL
ECHO AO ASC DIAM: 2.99 CM
ECHO AO ROOT DIAM: 3.27 CM
ECHO AV AREA PEAK VELOCITY: 2.16 CM2
ECHO AV AREA VTI: 2.39 CM2
ECHO AV AREA/BSA PEAK VELOCITY: 1.1 CM2/M2
ECHO AV AREA/BSA VTI: 1.2 CM2/M2
ECHO AV MEAN GRADIENT: 5.87 MMHG
ECHO AV PEAK GRADIENT: 9.54 MMHG
ECHO AV PEAK VELOCITY: 154.44 CM/S
ECHO AV VTI: 35.35 CM
ECHO IVC PROX: 2.03 CM
ECHO LA AREA 4C: 21.9 CM2
ECHO LA MAJOR AXIS: 4.04 CM
ECHO LA MINOR AXIS: 2.09 CM
ECHO LA VOL 2C: 56.05 ML (ref 22–52)
ECHO LA VOL 4C: 65.01 ML (ref 22–52)
ECHO LA VOL BP: 65.86 ML (ref 22–52)
ECHO LA VOL/BSA BIPLANE: 34.12 ML/M2 (ref 16–28)
ECHO LA VOLUME INDEX A2C: 29.04 ML/M2 (ref 16–28)
ECHO LA VOLUME INDEX A4C: 33.68 ML/M2 (ref 16–28)
ECHO LV E' LATERAL VELOCITY: 8.35 CENTIMETER/SECOND
ECHO LV E' SEPTAL VELOCITY: 9.28 CENTIMETER/SECOND
ECHO LV INTERNAL DIMENSION DIASTOLIC: 4.74 CM (ref 3.9–5.3)
ECHO LV INTERNAL DIMENSION SYSTOLIC: 3.37 CM
ECHO LV IVSD: 1.23 CM (ref 0.6–0.9)
ECHO LV MASS 2D: 217.4 G (ref 67–162)
ECHO LV MASS INDEX 2D: 112.6 G/M2 (ref 43–95)
ECHO LV POSTERIOR WALL DIASTOLIC: 1.19 CM (ref 0.6–0.9)
ECHO LVOT DIAM: 2.02 CM
ECHO LVOT PEAK GRADIENT: 4.36 MMHG
ECHO LVOT PEAK VELOCITY: 104.43 CM/S
ECHO LVOT SV: 84.4 ML
ECHO LVOT VTI: 26.39 CM
ECHO MV A VELOCITY: 111.73 CENTIMETER/SECOND
ECHO MV AREA PHT: 3.62 CM2
ECHO MV E DECELERATION TIME (DT): 209.81 MS
ECHO MV E VELOCITY: 120.23 CENTIMETER/SECOND
ECHO MV PRESSURE HALF TIME (PHT): 60.84 MS
ECHO PV MAX VELOCITY: 110.68 CM/S
ECHO PV PEAK INSTANTANEOUS GRADIENT SYSTOLIC: 4.91 MMHG
ECHO RV INTERNAL DIMENSION: 3.66 CM
ECHO RV TAPSE: 2.3 CM (ref 1.5–2)
ECHO TV REGURGITANT MAX VELOCITY: 174.6 CM/S
ECHO TV REGURGITANT PEAK GRADIENT: 12.19 MMHG
EOSINOPHIL # BLD: 0.1 K/UL (ref 0–0.4)
EOSINOPHIL NFR BLD: 1 % (ref 0–7)
ERYTHROCYTE [DISTWIDTH] IN BLOOD BY AUTOMATED COUNT: 14.9 % (ref 11.5–14.5)
GLUCOSE SERPL-MCNC: 104 MG/DL (ref 65–100)
HCT VFR BLD AUTO: 26.7 % (ref 35–47)
HGB BLD-MCNC: 8.4 G/DL (ref 11.5–16)
IMM GRANULOCYTES # BLD AUTO: 0.1 K/UL (ref 0–0.04)
IMM GRANULOCYTES NFR BLD AUTO: 1 % (ref 0–0.5)
INR PPP: 1 (ref 0.9–1.1)
LA VOL DISK BP: 60.6 ML (ref 22–52)
LVOT MG: 2.18 MMHG
LYMPHOCYTES # BLD: 1.8 K/UL (ref 0.8–3.5)
LYMPHOCYTES NFR BLD: 17 % (ref 12–49)
MAGNESIUM SERPL-MCNC: 2.5 MG/DL (ref 1.6–2.4)
MCH RBC QN AUTO: 30.4 PG (ref 26–34)
MCHC RBC AUTO-ENTMCNC: 31.5 G/DL (ref 30–36.5)
MCV RBC AUTO: 96.7 FL (ref 80–99)
MONOCYTES # BLD: 0.7 K/UL (ref 0–1)
MONOCYTES NFR BLD: 7 % (ref 5–13)
NEUTS SEG # BLD: 7.9 K/UL (ref 1.8–8)
NEUTS SEG NFR BLD: 73 % (ref 32–75)
NRBC # BLD: 0.03 K/UL (ref 0–0.01)
NRBC BLD-RTO: 0.3 PER 100 WBC
P-R INTERVAL, ECG05: 162 MS
PLATELET # BLD AUTO: 225 K/UL (ref 150–400)
PMV BLD AUTO: 11 FL (ref 8.9–12.9)
POTASSIUM SERPL-SCNC: 3.8 MMOL/L (ref 3.5–5.1)
PROTHROMBIN TIME: 10.7 SEC (ref 9–11.1)
Q-T INTERVAL, ECG07: 378 MS
QRS DURATION, ECG06: 92 MS
QTC CALCULATION (BEZET), ECG08: 452 MS
RBC # BLD AUTO: 2.76 M/UL (ref 3.8–5.2)
SODIUM SERPL-SCNC: 135 MMOL/L (ref 136–145)
TSH SERPL DL<=0.05 MIU/L-ACNC: 0.49 UIU/ML (ref 0.36–3.74)
VENTRICULAR RATE, ECG03: 86 BPM
WBC # BLD AUTO: 10.7 K/UL (ref 3.6–11)

## 2021-06-01 PROCEDURE — 84443 ASSAY THYROID STIM HORMONE: CPT

## 2021-06-01 PROCEDURE — 74011250637 HC RX REV CODE- 250/637: Performed by: NURSE PRACTITIONER

## 2021-06-01 PROCEDURE — 85610 PROTHROMBIN TIME: CPT

## 2021-06-01 PROCEDURE — 97530 THERAPEUTIC ACTIVITIES: CPT

## 2021-06-01 PROCEDURE — 94760 N-INVAS EAR/PLS OXIMETRY 1: CPT

## 2021-06-01 PROCEDURE — 74011250636 HC RX REV CODE- 250/636: Performed by: ORTHOPAEDIC SURGERY

## 2021-06-01 PROCEDURE — 74011250637 HC RX REV CODE- 250/637: Performed by: ORTHOPAEDIC SURGERY

## 2021-06-01 PROCEDURE — 99223 1ST HOSP IP/OBS HIGH 75: CPT | Performed by: INTERNAL MEDICINE

## 2021-06-01 PROCEDURE — 93005 ELECTROCARDIOGRAM TRACING: CPT

## 2021-06-01 PROCEDURE — 80048 BASIC METABOLIC PNL TOTAL CA: CPT

## 2021-06-01 PROCEDURE — 83735 ASSAY OF MAGNESIUM: CPT

## 2021-06-01 PROCEDURE — 97116 GAIT TRAINING THERAPY: CPT

## 2021-06-01 PROCEDURE — 36415 COLL VENOUS BLD VENIPUNCTURE: CPT

## 2021-06-01 PROCEDURE — 85025 COMPLETE CBC W/AUTO DIFF WBC: CPT

## 2021-06-01 PROCEDURE — 94640 AIRWAY INHALATION TREATMENT: CPT

## 2021-06-01 PROCEDURE — 74011000250 HC RX REV CODE- 250: Performed by: ORTHOPAEDIC SURGERY

## 2021-06-01 PROCEDURE — 65660000000 HC RM CCU STEPDOWN

## 2021-06-01 PROCEDURE — 97535 SELF CARE MNGMENT TRAINING: CPT

## 2021-06-01 PROCEDURE — 93306 TTE W/DOPPLER COMPLETE: CPT

## 2021-06-01 RX ORDER — METOPROLOL TARTRATE 25 MG/1
25 TABLET, FILM COATED ORAL EVERY 6 HOURS
Status: DISCONTINUED | OUTPATIENT
Start: 2021-06-01 | End: 2021-06-02

## 2021-06-01 RX ADMIN — ACETAMINOPHEN 650 MG: 325 TABLET ORAL at 16:25

## 2021-06-01 RX ADMIN — Medication 10 ML: at 21:21

## 2021-06-01 RX ADMIN — ARFORMOTEROL TARTRATE: 15 SOLUTION RESPIRATORY (INHALATION) at 21:16

## 2021-06-01 RX ADMIN — FAMOTIDINE 20 MG: 20 TABLET ORAL at 17:31

## 2021-06-01 RX ADMIN — LISINOPRIL 40 MG: 20 TABLET ORAL at 17:31

## 2021-06-01 RX ADMIN — ACETAMINOPHEN 650 MG: 325 TABLET ORAL at 21:21

## 2021-06-01 RX ADMIN — Medication 10 ML: at 05:00

## 2021-06-01 RX ADMIN — METOPROLOL TARTRATE 25 MG: 25 TABLET, FILM COATED ORAL at 23:53

## 2021-06-01 RX ADMIN — Medication 10 ML: at 13:52

## 2021-06-01 RX ADMIN — ACETAMINOPHEN 650 MG: 325 TABLET ORAL at 09:04

## 2021-06-01 RX ADMIN — WARFARIN SODIUM 6 MG: 5 TABLET ORAL at 18:55

## 2021-06-01 RX ADMIN — PHENOBARBITAL 129.6 MG: 32.4 TABLET ORAL at 21:21

## 2021-06-01 RX ADMIN — Medication 10 ML: at 04:59

## 2021-06-01 RX ADMIN — Medication 81 MG: at 09:05

## 2021-06-01 RX ADMIN — CELECOXIB 200 MG: 100 CAPSULE ORAL at 09:05

## 2021-06-01 RX ADMIN — FAMOTIDINE 20 MG: 20 TABLET ORAL at 09:05

## 2021-06-01 RX ADMIN — CELECOXIB 200 MG: 100 CAPSULE ORAL at 17:31

## 2021-06-01 RX ADMIN — METOPROLOL TARTRATE 25 MG: 25 TABLET, FILM COATED ORAL at 17:31

## 2021-06-01 RX ADMIN — ENOXAPARIN SODIUM 40 MG: 40 INJECTION SUBCUTANEOUS at 09:04

## 2021-06-01 RX ADMIN — Medication 81 MG: at 17:31

## 2021-06-01 RX ADMIN — ARFORMOTEROL TARTRATE: 15 SOLUTION RESPIRATORY (INHALATION) at 07:28

## 2021-06-01 RX ADMIN — Medication 10 ML: at 21:20

## 2021-06-01 RX ADMIN — ACETAMINOPHEN 650 MG: 325 TABLET ORAL at 04:51

## 2021-06-01 NOTE — PROGRESS NOTES
Pt received in bed, spouse has not returned with personal w/c. Pt reports lateral transfers \" much easier\" than attempting SPT for return to bed. Declining to participate with therapy this afternoon, stating \"recovering from that ultrasound\" and awaiting cardiology. PT will follow up next treatment day. Fausto Reyes Paget

## 2021-06-01 NOTE — PROGRESS NOTES
Bhavin Ndiaye [facility] [address]  [address] [facility], [address] [place] [address]  [phone]      Medical Progress Note      NAME: Justus Car   :  1955  MRM:  [id number]    Date of service: 2021  1:20 PM       Assessment and Plan:     Heart Palpitations/ SVT: check tsh and echo. Telemetry. Consult cardiology. Right femoral fracture: evaluated by orthopedics and had ORIF on . Oxycodone Prn and IV dilaudid PRN for pain and monitor for sedation. Right Humerus fracture (2021). Non operative management for now. Sling. NWB RUE. Oxycodone Prn and IV dilaudid PRN for pain and monitor for sedation. Anemia. Significant drop from baseline but has been stable. Likely blood loss from surgery. transfuse as needed to keep Hb > 7. Transaminitis. Resolved. ABD US showed Cholelithiasis. No evidence of acute cholecystitis. acute hepatitis panel is negative. Monitor      Seizure disorder ([address] (2021): Continue home phenobarbital.     HTN (hypertension) (2021): Continue home amlodipine, lisinopril.      HLD (hyperlipidemia) (2021): Not on home statin. Subjective:     Chief Complaint[de-identified] Patient was seen and examined as a follow up for humeral and femoral fracture. Chart was reviewed. States pain controlled. ROS:  (bold if positive, if negative)    Tolerating PT  Tolerating Diet        Objective:     Last 24hrs VS reviewed since prior progress note.  Most recent are:    Visit Vitals  /67 (BP 1 Location: Left upper arm, BP Patient Position: Sitting)   Pulse 88   Temp 98.4 °F (36.9 °C)   Resp 16   Ht 5' 3\" (1.6 m)   Wt 90.7 kg (200 lb)   SpO2 98%   BMI 35.43 kg/m²     SpO2 Readings from Last 6 Encounters:   21 98%    O2 Flow Rate (L/min): 2 l/min       Intake/Output Summary (Last 24 hours) at 2021 1320  Last data filed at 2021 0858  Gross per 24 hour   Intake 1778.75 ml   Output 1050 ml   Net 728.75 ml        Physical Exam:    Gen: Well-developed, well-nourished, in no acute distress  HEENT:  Pink conjunctivae, PERRL, hearing intact to voice, moist mucous membranes  Neck:  Supple, without masses, thyroid non-tender  Resp:  No accessory muscle use, clear breath sounds without wheezes rales or rhonchi  Card:  No murmurs, normal S1, S2 without thrills, bruits or peripheral edema  Abd:  Soft, non-tender, non-distended, normoactive bowel sounds are present, no palpable organomegaly and no detectable hernias  Lymph:  No cervical or inguinal adenopathy  Musc:  No cyanosis or clubbing  Skin:  No rashes or ulcers, skin turgor is good  Neuro:  Cranial nerves are grossly intact, no focal motor weakness, follows commands appropriately  Psych:  Good insight, oriented to person, place and time, alert  __________________________________________________________________  Medications Reviewed: (see below)  Medications:     Current Facility-Administered Medications   Medication Dose Route Frequency    sodium chloride (NS) flush 5-40 mL  5-40 mL IntraVENous Q8H    sodium chloride (NS) flush 5-40 mL  5-40 mL IntraVENous PRN    acetaminophen (TYLENOL) tablet 650 mg  650 mg Oral Q6H    oxyCODONE IR (ROXICODONE) tablet 5 mg  5 mg Oral Q3H PRN    oxyCODONE IR (ROXICODONE) tablet 10 mg  10 mg Oral Q3H PRN    celecoxib (CELEBREX) capsule 200 mg  200 mg Oral BID    naloxone (NARCAN) injection 0.4 mg  0.4 mg IntraVENous PRN    famotidine (PEPCID) tablet 20 mg  20 mg Oral BID    senna-docusate (PERICOLACE) 8.6-50 mg per tablet 1 Tablet  1 Tablet Oral BID    polyethylene glycol (MIRALAX) packet 17 g  17 g Oral DAILY    [START ON 6/2/2021] bisacodyL (DULCOLAX) suppository 10 mg  10 mg Rectal DAILY PRN    aspirin delayed-release tablet 81 mg  81 mg Oral BID    0.9% sodium chloride infusion 250 mL  250 mL IntraVENous PRN    sodium chloride (NS) flush 5-40 mL  5-40 mL IntraVENous Q8H    sodium chloride (NS) flush 5-40 mL  5-40 mL IntraVENous PRN    acetaminophen (TYLENOL) tablet 650 mg  650 mg Oral Q6H PRN    Or    acetaminophen (TYLENOL) suppository 650 mg  650 mg Rectal Q6H PRN    polyethylene glycol (MIRALAX) packet 17 g  17 g Oral DAILY PRN    promethazine (PHENERGAN) tablet 12.5 mg  12.5 mg Oral Q6H PRN    Or    ondansetron (ZOFRAN) injection 4 mg  4 mg IntraVENous Q6H PRN    enoxaparin (LOVENOX) injection 40 mg  40 mg SubCUTAneous DAILY    amLODIPine (NORVASC) tablet 5 mg  5 mg Oral QPM    albuterol (PROVENTIL VENTOLIN) nebulizer solution 2.5 mg  2.5 mg Nebulization Q4H PRN    PHENobarbitaL (LUMINAL) tablet 129.6 mg  129.6 mg Oral QHS    lisinopriL (PRINIVIL, ZESTRIL) tablet 40 mg  40 mg Oral QPM    arformoterol 15 mcg/budesonide 0.5 mg neb solution   Nebulization BID RT        Lab Data Reviewed: (see below)  Lab Review:     Recent Labs     06/01/21  0503 05/31/21  0115 05/30/21  1016 05/30/21  0404   WBC 10.7 13.3*  --  11.9*   HGB 8.4* 8.0* 6.4* 6.5*   HCT 26.7* 24.6* 19.6* 20.3*    197  --  199     Recent Labs     06/01/21  0503 05/31/21  0115 05/30/21  0040   * 137 136   K 3.8 3.4* 4.1    106 106   CO2 22 23 25   * 119* 118*   BUN 13 12 14   CREA 0.54* 0.60 0.61   CA 8.3* 7.8* 8.0*   MG 2.5* 2.3 1.9   ALB  --  3.0* 3.6   TBILI  --  0.6 0.4   ALT  --  45 44     No results found for: GLUCPOC  No results for input(s): PH, PCO2, PO2, HCO3, FIO2 in the last 72 hours. No results for input(s): INR, INREXT, INREXT in the last 72 hours. All Micro Results     Procedure Component Value Units Date/Time    COVID-19 RAPID TEST [[id number]] Collected: 05/28/21 4878    Order Status: Completed Specimen: Nasopharyngeal Updated: 05/29/21 0043     Specimen source Nasopharyngeal        COVID-19 rapid test Not detected        Comment: Rapid Abbott ID Now       Rapid NAAT:  The specimen is NEGATIVE for SARS-CoV-2, the novel coronavirus associated with COVID-19.        Negative results should be treated as presumptive and, if inconsistent with clinical signs and symptoms or necessary for patient management, should be tested with an alternative molecular assay. Negative results do not preclude SARS-CoV-2 infection and should not be used as the sole basis for patient management decisions. This test has been authorized by the FDA under an Emergency Use Authorization (EUA) for use by authorized laboratories. Fact sheet for Healthcare Providers: ConventionUpdate.co.nz  Fact sheet for Patients: ConventionUpdate.co.nz       Methodology: Isothermal Nucleic Acid Amplification               I have reviewed notes of prior 24hr. Other pertinent lab: Total time spent with patient: 28 I personally reviewed chart, notes, data and current medications in the medical record. I have personally examined and treated the patient at bedside during this period.                  Care Plan discussed with: Patient, Family, Nursing Staff and >50% of time spent in counseling and coordination of care    Discussed:  Care Plan    Prophylaxis:  Lovenox    Disposition:  SNF/LTC versus HH            ___________________________________________________    Attending Physician: Odalys Restrepo DO

## 2021-06-01 NOTE — PROGRESS NOTES
Occupational Therapy Note; Spoke with PT who stated pt reporting \"heart palpitations\" with HR 97.  and RN aware. Will cont to follow.

## 2021-06-01 NOTE — PROGRESS NOTES
6/1/2021     2:56 PM  CM received acceptance from [facility] for [facility] services. Hemiwalker delivered by [employer] [facility]. 11:05 AM  RUR:  10%  Risk Level: [x]Low []Moderate []High  Value-based purchasing: [] Yes [x] No  Bundle patient: [] Yes [x] No   Specify:     Transition of care plan:  1. Awaiting medical clearance and DC order. PT/OT treating. Ortho following. 2. Home with [facility] - CM consult for [facility] noted. Pt indicated [facility] and [facility] as preference. CM completed referral to [facility], and is awaiting response. Pt's  will bring wheelchair to hospital for PT to evaluate. CM consult for hemiwalker noted. CM completed referral to [employer] [facility], and is awaiting response. 3. Medicare pt has received, reviewed, and signed 2nd IM letter informing them of their right to appeal the discharge. Signed copied has been placed on pt bedside chart. 4. Outpatient follow-up. 5. Transport need TBD.

## 2021-06-01 NOTE — PROGRESS NOTES
ORTHO PROGRESS NOTE      SUBJECTIVE:  aPnkaj Brian states her leg and shoulder pain are controlled. OBJECTIVE:  Patient Vitals for the past 24 hrs:   Temp Pulse BP   06/01/21 0800 98.8 °F (37.1 °C) 91 (!) 106/45   06/01/21 0700  83    06/01/21 0344 99.1 °F (37.3 °C) 89 (!) 116/56   05/31/21 2335  88    05/31/21 2238 98.7 °F (37.1 °C) 88 106/62   05/31/21 1922 98.8 °F (37.1 °C) 88 115/66   05/31/21 1554 97.1 °F (36.2 °C) 89 109/69   05/31/21 1500  92    05/31/21 1241 98.4 °F (36.9 °C) (!) 101 121/65   05/31/21 1025  95 139/60   05/31/21 1015  99 (!) 141/67       Alert, no distress. Lying in bed. Family present. Respirations unlabored. RUE shoulder ecchymosis, intact skin. Mild edema upper arm. Compartments soft. Moves digits, wrist, elbow OK. Sling bedside. Dressing RLE CDI. Thigh edema but soft. No pain with gentle PROM hip. No pain gentle PROM knee 0-45. SILT bilat foot. Calves non-tender. Moves ankles OK. Recent Labs     06/01/21  0503   HGB 8.4*   HCT 26.7*      BUN 13   CREA 0.54*   GFRAA >60   GFRNA >60       PT/OT:   Gait:  Gait  Base of Support: Widened  Speed/Laurence: Slow  Step Length: Right shortened, Left shortened  Stance: Right decreased  Gait Abnormalities: Decreased step clearance, Step to gait  Ambulation - Level of Assistance: Assist x2, Minimal assistance  Distance (ft): 3 Feet (ft)  Assistive Device: Gait belt, Walker reji (sling to RUE)                 ASSESSMENT:  Procedure: Procedure(s):  RIGHT DISTAL FEMUR OPEN REDUCTION INTERNAL FIXATION  Post Op day: 3 Days Post-Op  Proximal right humerus fracture. PLAN:  PT/OT: NWB RUE, sling (at least when OOB). OK AROM hand, wrist, elbow as vicki. TTWB RLE. OK knee ROM 0-45. Reji walker LUE. Analgesics: Tylenol  DVT proph: Lovenox min 30 days. Disp planning. Patient and  prefer to home. They have access to DME except reji walker. F/U: Dr. Meg Giraldo team 2 weeks. OK to home from Ortho perspective.     Daniela Winter JANETTE Hyman  Orthopedic Trauma Service  [facility]

## 2021-06-01 NOTE — CONSULTS
[facility] [address] [phone]  Mobile [phone]    Cardiology Consult Note    Date of  Admission: 2021  1:06 PM  PCP- MD Caroline Watt MD  :  1955   MRN:  [id number]     CC: s/p fall   Reason for consult: SVT/palpitations  Admission Diagnosis: Humerus fracture [S42.309A]    Malika Grimes is a 76 y.o. female admitted for Humerus fracture [S42.309A]. Consult requested by Chante Wooten,        Subjective:   Humerus fracture [S42.309A]        Impression Plan/Recommendation   PAF  S/p right femur frx s/p repair  Anemia  Hypertension   Hx of epileptic seizures                 ecg nsr, qtc 480 msec, only on pepcid. No prior ecg to compare  Reviewed telemetry, appears to be short burst of PAF and 6 beats of Aflutter  Change norvasc to metoprolol 25 mg q6h, change to 50 bid if she tolerates. [id number], will need anti-coagulation, unfortunately she cannot be on a DOAC d/t interaction w phenobarbital. Discussed starting warfarin and she is agreeable. Will consult pharmacy to start tonight, discussed w Ortho PA- Will , okay [place] [facility] [place]. Will need to d/c lovenox and asa once inr is btw 2-3  TSH wnl  Consider loop on d/c    Will arrange cardiology follow up w Dr Manish Meza. Pt personally seen and examined. Chart reviewed. Agree with advanced NP's history, exam and  A/P with changes/additons. Blood pressure 132/76, pulse 91, temperature 99 °F (37.2 °C), resp. rate 16, height 5' 3\" (1.6 m), weight 200 lb (90.7 kg), SpO2 99 %. CVS-S1-S2 present, reg; 2/6 systolic murmur present  RS-   CTAB     Abdomen-  soft/NT      A/P -PAF - on B Blockers; Coumadin           Anemia - needs close monitoring of Hb on coumadin    21    ECHO ADULT COMPLETE 2021    Interpretation Summary  · LV: Estimated LVEF is 55 - 60%.  Normal cavity size, wall thickness and systolic function (ejection fraction normal). Wall motion: normal.  · LA: Mildly dilated left atrium. · AV: Probably trileaflet aortic valve. · MV: Mild mitral valve regurgitation is present. Signed by: Alessandro Marley MD on 2021  4:09 PM          Ashvin Mina MD, [facility] [place]        Subjective:  Rickey Larsne is a 76 y.o. female I am seeing for heart palpitations. Patient is here s/p right femur repair d/t proximal right femur fracture. Pmhx includes hypertension, asthma, obesity, hyperlipidemia and seizures. She denies prior hx of cardiac problems/testing. Here s/p fall, no question of syncope. She reports \"heart fluttering\" she has occasionally been feeling 1 week prior to fall. No associated chest pain, SOB, LE edema. No hx of GI bleed. Family hx: mother  of \"heart attack\"? Social hx: denies tobacco or etoh use.  Does all ADLs at home independently       Past Medical History:   Diagnosis Date    Hyperlipidemia     Hypertension     Seizures ([address]       Past Surgical History:   Procedure Laterality Date    HX GYN      HX ORTHOPAEDIC         Hospital Medications:  Current Facility-Administered Medications   Medication Dose Route Frequency    sodium chloride (NS) flush 5-40 mL  5-40 mL IntraVENous Q8H    sodium chloride (NS) flush 5-40 mL  5-40 mL IntraVENous PRN    acetaminophen (TYLENOL) tablet 650 mg  650 mg Oral Q6H    oxyCODONE IR (ROXICODONE) tablet 5 mg  5 mg Oral Q3H PRN    oxyCODONE IR (ROXICODONE) tablet 10 mg  10 mg Oral Q3H PRN    celecoxib (CELEBREX) capsule 200 mg  200 mg Oral BID    naloxone (NARCAN) injection 0.4 mg  0.4 mg IntraVENous PRN    famotidine (PEPCID) tablet 20 mg  20 mg Oral BID    senna-docusate (PERICOLACE) 8.6-50 mg per tablet 1 Tablet  1 Tablet Oral BID    polyethylene glycol (MIRALAX) packet 17 g  17 g Oral DAILY    [START ON 2021] bisacodyL (DULCOLAX) suppository 10 mg  10 mg Rectal DAILY PRN    aspirin delayed-release tablet 81 mg  81 mg Oral BID 0.9% sodium chloride infusion 250 mL  250 mL IntraVENous PRN    sodium chloride (NS) flush 5-40 mL  5-40 mL IntraVENous Q8H    sodium chloride (NS) flush 5-40 mL  5-40 mL IntraVENous PRN    acetaminophen (TYLENOL) tablet 650 mg  650 mg Oral Q6H PRN    Or    acetaminophen (TYLENOL) suppository 650 mg  650 mg Rectal Q6H PRN    polyethylene glycol (MIRALAX) packet 17 g  17 g Oral DAILY PRN    promethazine (PHENERGAN) tablet 12.5 mg  12.5 mg Oral Q6H PRN    Or    ondansetron (ZOFRAN) injection 4 mg  4 mg IntraVENous Q6H PRN    enoxaparin (LOVENOX) injection 40 mg  40 mg SubCUTAneous DAILY    amLODIPine (NORVASC) tablet 5 mg  5 mg Oral QPM    albuterol (PROVENTIL VENTOLIN) nebulizer solution 2.5 mg  2.5 mg Nebulization Q4H PRN    PHENobarbitaL (LUMINAL) tablet 129.6 mg  129.6 mg Oral QHS    lisinopriL (PRINIVIL, ZESTRIL) tablet 40 mg  40 mg Oral QPM    arformoterol 15 mcg/budesonide 0.5 mg neb solution   Nebulization BID RT     No current facility-administered medications on file prior to encounter. Current Outpatient Medications on File Prior to Encounter   Medication Sig Dispense Refill    albuterol (PROVENTIL HFA, VENTOLIN HFA, PROAIR HFA) 90 mcg/actuation inhaler Take 2 Puffs by inhalation every four (4) hours as needed for Wheezing. amLODIPine (NORVASC) 5 mg tablet Take 5 mg by mouth every evening. diclofenac EC (VOLTAREN) 75 mg EC tablet Take 75 mg by mouth two (2) times a day. Takes with Tylenol Arthritis      fluticasone propion-salmeteroL (Wixela Inhub) 250-50 mcg/dose diskus inhaler Take 1 Puff by inhalation every twelve (12) hours. lisinopril-hydroCHLOROthiazide (PRINZIDE, ZESTORETIC) 20-12.5 mg per tablet Take 2 Tablets by mouth every evening. PHENobarbitaL (LUMINAL) 32.4 mg tablet Take 129.6 mg by mouth nightly. 129.6 mg = 4 tablets      acetaminophen (Tylenol Arthritis Pain) 650 mg TbER Take 1,300 mg by mouth two (2) times a day.  Takes with diclofenac           Allergies   Allergen Reactions    Seasonale [Levonorgestrel-Ethinyl Estrad] Runny Nose             Review of Symptoms:  All other systems reviewed are negative except as above. Physical Exam    Visit Vitals  /67 (BP 1 Location: Left upper arm, BP Patient Position: Sitting)   Pulse 88   Temp 98.4 °F (36.9 °C)   Resp 16   Ht 5' 3\" (1.6 m)   Wt 200 lb (90.7 kg)   SpO2 98%   BMI 35.43 kg/m²     General Appearance:  Well developed, obese,alert and oriented x 3, and individual in no acute distress. Ears/Nose/Mouth/Throat:   Hearing grossly normal.Normal oral mucosa,no scleral icterus     Neck: Supple no JVD    Chest:   Lungs clear to auscultation bilaterally,no rales rhonchi or wheezing   Cardiovascular:  Regular rate and rhythm, 2/6 Murmur rsb. Abdomen:   Soft, non-tender. Extremities: No edema bilaterally. Pulses detected, no varicosities   Skin: Warm and dry. No bruising  Neuro  Moves all extermities and neurologically intact                                                       Cardiographics    Telemetry: NSR, PAF v rates up 150-170s at times in burst  ECG: as above              Cardiac testing      No results for input(s): CPK, CKMB, TROIQ in the last 72 hours. No lab exists for component: CKQMB, CPKMB    Recent Labs     06/01/21  0503 05/31/21  0115 05/30/21  1016 05/30/21  0404 05/30/21  0040   * 137  --   --  136   K 3.8 3.4*  --   --  4.1   CO2 22 23  --   --  25   BUN 13 12  --   --  14   CREA 0.54* 0.60  --   --  0.61   * 119*  --   --  118*   MG 2.5* 2.3  --   --  1.9   WBC 10.7 13.3*  --  11.9*  --    HGB 8.4* 8.0* 6.4* 6.5*  --    HCT 26.7* 24.6* 19.6* 20.3*  --     197  --  199  --        I have discussed the diagnosis with the patient and the intended plan as seen in the above orders. Questions were answered concerning future plans. I have discussed medication side effects and warnings with the patient as well.     Florecita Barahona is in agreement to the plan listed above and wishes to proceed. she  was instructed not to smoke, eat heart healthy diet  and to exercise. Thank you for this consult.     Smitha Mai NP

## 2021-06-01 NOTE — PROGRESS NOTES
Problem: Falls - Risk of  Goal: *Absence of Falls  Description: Document Burrell Canavan Fall Risk and appropriate interventions in the flowsheet. Outcome: Progressing Towards Goal  Note: Fall Risk Interventions:  Mobility Interventions: Bed/chair exit alarm, Patient to call before getting OOB, Utilize walker, cane, or other assistive device, Utilize gait belt for transfers/ambulation         Medication Interventions: Bed/chair exit alarm, Patient to call before getting OOB, Teach patient to arise slowly, Utilize gait belt for transfers/ambulation    Elimination Interventions: Bed/chair exit alarm, Call light in reach, Patient to call for help with toileting needs    History of Falls Interventions: Bed/chair exit alarm, Room close to nurse's station         Problem: Patient Education: Go to Patient Education Activity  Goal: Patient/Family Education  Outcome: Progressing Towards Goal     Problem: Pressure Injury - Risk of  Goal: *Prevention of pressure injury  Description: Document Elbert Scale and appropriate interventions in the flowsheet.   Outcome: Progressing Towards Goal  Note: Pressure Injury Interventions:  Sensory Interventions: Assess changes in LOC, Float heels, Keep linens dry and wrinkle-free, Maintain/enhance activity level, Minimize linen layers    Moisture Interventions: Absorbent underpads, Minimize layers, Offer toileting Q_hr    Activity Interventions: Increase time out of bed, PT/OT evaluation    Mobility Interventions: Assess need for specialty bed, HOB 30 degrees or less, PT/OT evaluation    Nutrition Interventions: Document food/fluid/supplement intake, Offer support with meals,snacks and hydration    Friction and Shear Interventions: HOB 30 degrees or less, Lift team/patient mobility team, Minimize layers                Problem: Patient Education: Go to Patient Education Activity  Goal: Patient/Family Education  Outcome: Progressing Towards Goal     Problem: Patient Education: Go to Patient Education Activity  Goal: Patient/Family Education  Outcome: Progressing Towards Goal     Problem: Patient Education: Go to Patient Education Activity  Goal: Patient/Family Education  Outcome: Progressing Towards Goal

## 2021-06-01 NOTE — PROGRESS NOTES
1030: Notified by PT that patient was experiencing \"fast HR\". Per PT, her pulse at that time was in the mid 90s. EKG obtained and sent the following message to Dr Pa More @ [phone] via [employer]: I received a call from tele that patients HR went up in the 130's twice this morning. She did not sustain it and was NSR shortly after. I assessed her at that time and she did not report any symptoms. About an hour later, PT notified me that she was complaining of palpitations. No CP or SOB. The palpitations subsided after a few minutes. I obtained an EKG which said \"NSR, Septal infarct undetermined age. \" please advise. Dr Pa More added on a TSH to AM labs. 0915: received call from tele that patient had a run of SVT x 2 w/ HR in the 130s-140s. Assessed patient and she denied any CP,  Lightheadedness, SOB, HA, and palpitations. She was resting in bed w/  at bedside in no acute distress. Advised patient to call if she experienced any of the above symptoms. She verbalized understanding. Problem: Falls - Risk of  Goal: *Absence of Falls  Description: Document Marshall Maple Fall Risk and appropriate interventions in the flowsheet.   Outcome: Progressing Towards Goal  Note: Fall Risk Interventions:  Mobility Interventions: Bed/chair exit alarm, Communicate number of staff needed for ambulation/transfer    Medication Interventions: Teach patient to arise slowly, Patient to call before getting OOB    Elimination Interventions: Bed/chair exit alarm, Call light in reach    History of Falls Interventions: Bed/chair exit alarm, Door open when patient unattended    Problem: Patient Education: Go to Patient Education Activity  Goal: Patient/Family Education  Outcome: Progressing Towards Goal

## 2021-06-01 NOTE — PROGRESS NOTES
Problem: Mobility Impaired (Adult and Pediatric)  Goal: *Acute Goals and Plan of Care (Insert Text)  Outcome: Progressing Towards Goal  Note:   PHYSICAL THERAPY TREATMENT  Patient: Bakari Ramos (67 y.o. female)  Date: 6/1/2021  Diagnosis: Humerus fracture [S42.309A] <principal problem not specified>  Procedure(s) (LRB):  RIGHT DISTAL FEMUR OPEN REDUCTION INTERNAL FIXATION (Right) 3 Days Post-Op  Precautions: Fall (NWB RUE; TTWB RLE)  Chart, physical therapy assessment, plan of care and goals were reviewed. ASSESSMENT  Patient continues with skilled PT services and progressing towards goals. Spouse to bring w/c from home later today. Son as built ramp for entry into home. Pt with one LOB attempting SPT bed>chair using HW, requiring Max A for steadying. Performed lateral transfer chair<>bed with CGA/ Min A. Pt remained in chair at end of session BLE elevated. Current Level of Function Impacting Discharge (mobility/balance): up to max A for SPT transfer    Other factors to consider for discharge: good family support         PLAN :  Patient continues to benefit from skilled intervention to address the above impairments. Continue treatment per established plan of care. to address goals. Recommendation for discharge: (in order for the patient to meet his/her long term goals)  Physical therapy at least 2 days/week in the home AND ensure assist and/or supervision for safety with mobility    This discharge recommendation:  Has been made in collaboration with the attending provider and/or case management    IF patient discharges home will need the following DME: pia walker       SUBJECTIVE:   Patient stated that was easier (lateral trasnfer).     OBJECTIVE DATA SUMMARY:   Critical Behavior:  Neurologic State: Alert  Orientation Level: Oriented X4  Cognition: Appropriate decision making, Appropriate for age attention/concentration, Appropriate safety awareness, Follows commands  Safety/Judgement: Insight into deficits, Good awareness of safety precautions  Functional Mobility Training:  Bed Mobility:     Supine to Sit: Moderate assistance;Assist x1;Additional time     Scooting: Moderate assistance        Transfers:  Sit to Stand: Moderate assistance;Assist x1;Additional time  Stand to Sit: Moderate assistance;Assist x1;Adaptive equipment                             Balance:  Sitting: Intact  Standing: Impaired  Standing - Static: Constant support; Fair  Standing - Dynamic : Fair;Poor  Ambulation/Gait Training:  Distance (ft): 1 Feet (ft)  Assistive Device: Gait belt;Walker pia  Ambulation - Level of Assistance: Moderate assistance;Maximum assistance;Assist x1           Right Side Weight Bearing: Toe touch        Stance: Right decreased  Speed/Laurence: Slow                       Stairs: Therapeutic Exercises: Ankle pumps, heel slides to 45 degrees, ROM on elbow, wrist and hand  Pain Rating:  Denies pain    Activity Tolerance:   Fair    After treatment patient left in no apparent distress:   Sitting in chair, Call bell within reach, and Bed / chair alarm activated    COMMUNICATION/COLLABORATION:   The patients plan of care was discussed with: Registered nurse.      Angela Mendoza   Time Calculation: 27 mins

## 2021-06-01 NOTE — PROGRESS NOTES
3  Problem: Self Care Deficits Care Plan (Adult)  Goal: *Acute Goals and Plan of Care (Insert Text)  Description:   FUNCTIONAL STATUS PRIOR TO ADMISSION: Patient was independent and active without use of DME. Patient was independent for basic and instrumental ADLs. HOME SUPPORT: The patient lived with  but did not require assist.    Occupational Therapy Goals  Initiated 5/31/2021  1. Patient will perform grooming, seated, with supervision/set-up using R UE as fine motor assist within 7 day(s). 2.  Patient will perform upper body ADLs with minimal assistance/contact guard assist within 7 day(s). 3.  Patient will don/doff arm sling at minimal assistance/contact guard assist level within 7 days. 4.  Patient will perform toilet transfers to [facility] with minimal assistance/contact guard assist within 7 day(s). 5.  Patient will perform all aspects of toileting with minimal assistance within 7 day(s). 6.  Patient will utilize energy conservation techniques during functional activities with verbal cues within 7 day(s). Outcome: Progressing Towards Goal   OCCUPATIONAL THERAPY TREATMENT  Patient: Agata Galicia (52 y.o. female)  Date: 6/1/2021  Diagnosis: Humerus fracture [S42.309A] <principal problem not specified>  Procedure(s) (LRB):  RIGHT DISTAL FEMUR OPEN REDUCTION INTERNAL FIXATION (Right) 3 Days Post-Op  Precautions: Fall (NWB RUE; TTWB RLE)  Chart, occupational therapy assessment, plan of care, and goals were reviewed. ASSESSMENT  Patient continues with skilled OT services and is progressing towards goals. Pt seated in chair, HR 95 at rest. Pt brushed her teeth, including opening containers R hand.  Educated as to donning/doffing sling which she stated her  could assist.    Current Level of Function Impacting Discharge (ADLs): Contact guard oral hygiene, brushing hair    Other factors to consider for discharge:          PLAN :  Patient continues to benefit from skilled intervention to address the above impairments. Continue treatment per established plan of care to address goals. Recommend with staff: ADl's, there act seated in chair    Recommend next OT session: cont towards goals    Recommendation for discharge: (in order for the patient to meet his/her long term goals)  To be determined: pt preference to retuurn home with HHOT/PT and 24/7 Assist of family/friends; if 24 hour assist unavailable, she will require rehab    This discharge recommendation:  Has not yet been discussed the attending provider and/or case management    IF patient discharges home will need the following DME:        SUBJECTIVE:   Patient stated I have ramps at home.     OBJECTIVE DATA SUMMARY:   Cognitive/Behavioral Status:  Neurologic State: Alert  Orientation Level: Oriented X4  Cognition: Follows commands             Functional Mobility and Transfers for ADLs:  Bed Mobility:  Supine to Sit: Moderate assistance;Assist x1;Additional time  Scooting: Moderate assistance    Transfers:  Sit to Stand: Moderate assistance;Assist x1;Additional time          Balance:  Sitting: Intact  Standing: Impaired  Standing - Static: Constant support; Fair  Standing - Dynamic : Fair;Poor    ADL Intervention:       Grooming  Grooming Assistance: Contact guard assistance  Position Performed: Seated in chair  Brushing Teeth: Contact guard assistance  Brushing/Combing Hair: Contact guard assistance              Upper Body Dressing Assistance  Orthotics(Brace): Minimum assistance         Therapeutic Exercises:   Pt performing R hand flex/ext, supination/pronation and elbow flex/ext 3 sets of 10. Activity Tolerance:   Fair    After treatment patient left in no apparent distress:   Sitting in chair    COMMUNICATION/COLLABORATION:   The patients plan of care was discussed with: Physical therapy assistant, Occupational therapist, and Registered nurse.      WILFREDO Brooks  Time Calculation: 17 mins

## 2021-06-01 NOTE — PROGRESS NOTES
Physician Progress Note      Robert Smith  CSN #:                  [id number]  :                       1955  ADMIT DATE:       2021 1:06 PM  [address] [place] DATE:  RESPONDING  PROVIDER #:        DANA MONTES DO          QUERY TEXT:    Good Afternoon  This patient admitted for Right femoral and Right humerus fracture. The X-rays of the Right humerus fracture noted \"Acute comminuted, mildly displaced fx of the right humeral head. Osseous structures are diffusely demineralized\". Regarding the Right humerus fracture, could you please further specify the type of fracture ,and If possible, please document in progress notes and discharge summary if you are evaluating and/or treating any of the following: The medical record reflects the following:  Risk Factors:  Age, female Post menopausal,  Clinical Indicators:  Pt fell, Humeral and femoral fracture. Xray of the right humerus noted \"Osseous structures  are diffusely demineralized\" Acute communicated , midly displaced fracture of the right humeral head. Treatment:  Ortho consulted, Xrays, Right humerus non-operative management for now, placed in sling. Pain controlled. PT to Or for the right femoral fx (ORIF)    Thank you,  Yuliana Loyaywood, [address] [facility], [address], [address] [place] [address]  Options provided:  -- Osteoporotic Right humeral head  fracture following fall which would not usually break a normal, healthy bone  -- Traumatic RIght Humeral head  fracture  -- Other - I will add my own diagnosis  -- Disagree - Not applicable / Not valid  -- Disagree - Clinically unable to determine / Unknown  -- Refer to Clinical Documentation Reviewer    PROVIDER RESPONSE TEXT:    This patient has a traumatic fracture of Right humeral head.     Query created by: Kem Gonzalez on 2021 12:23 PM      Electronically signed by:  Aleida Lang DO 2021 5:17 PM

## 2021-06-01 NOTE — PROGRESS NOTES
Physical therapy    957 pt received in bed,pt reports \"heart palpitations\" HR 97 per telebox, RN notified. PT will follow up later this today    Aurelio Palacio. Alexi Collier

## 2021-06-01 NOTE — PROGRESS NOTES
[facility] Dosing Services: 6/1/2021    Consult for Warfarin Dosing by Pharmacy by SHARIFA Garcia NP. Consult provided for this 76 y.o.  female , for indication of Atrial Fibrillation. Day of Therapy 1  Dose to achieve an INR goal of 2-3    Order entered for  Warfarin  6 (mg) ordered to be given today at 18:00. Significant drug interactions: ASA & Enoxaparin prophylaxis, phenobarbital decrease INR  PT/INR Lab Results   Component Value Date/Time    INR 1.0 06/01/2021 05:25 PM      Platelets Lab Results   Component Value Date/Time    PLATELET 517 25/06/5158 05:03 AM      H/H Lab Results   Component Value Date/Time    HGB 8.4 (L) 06/01/2021 05:03 AM        Pharmacy to follow daily and will provide subsequent Warfarin dosing based on clinical status.   Loco Stinson)  Contact information [phone]

## 2021-06-02 LAB
BASOPHILS # BLD: 0.1 K/UL (ref 0–0.1)
BASOPHILS NFR BLD: 1 % (ref 0–1)
DIFFERENTIAL METHOD BLD: ABNORMAL
EOSINOPHIL # BLD: 0.2 K/UL (ref 0–0.4)
EOSINOPHIL NFR BLD: 2 % (ref 0–7)
ERYTHROCYTE [DISTWIDTH] IN BLOOD BY AUTOMATED COUNT: 14.7 % (ref 11.5–14.5)
HCT VFR BLD AUTO: 23.5 % (ref 35–47)
HCT VFR BLD AUTO: 24.8 % (ref 35–47)
HGB BLD-MCNC: 7.4 G/DL (ref 11.5–16)
HGB BLD-MCNC: 7.9 G/DL (ref 11.5–16)
IMM GRANULOCYTES # BLD AUTO: 0.1 K/UL (ref 0–0.04)
IMM GRANULOCYTES NFR BLD AUTO: 1 % (ref 0–0.5)
INR PPP: 1 (ref 0.9–1.1)
LYMPHOCYTES # BLD: 1.4 K/UL (ref 0.8–3.5)
LYMPHOCYTES NFR BLD: 18 % (ref 12–49)
MAGNESIUM SERPL-MCNC: 2.2 MG/DL (ref 1.6–2.4)
MCH RBC QN AUTO: 30.8 PG (ref 26–34)
MCHC RBC AUTO-ENTMCNC: 31.5 G/DL (ref 30–36.5)
MCV RBC AUTO: 97.9 FL (ref 80–99)
MONOCYTES # BLD: 0.4 K/UL (ref 0–1)
MONOCYTES NFR BLD: 6 % (ref 5–13)
NEUTS SEG # BLD: 5.7 K/UL (ref 1.8–8)
NEUTS SEG NFR BLD: 72 % (ref 32–75)
NRBC # BLD: 0.03 K/UL (ref 0–0.01)
NRBC BLD-RTO: 0.4 PER 100 WBC
PLATELET # BLD AUTO: 240 K/UL (ref 150–400)
PMV BLD AUTO: 10.9 FL (ref 8.9–12.9)
PROTHROMBIN TIME: 10.6 SEC (ref 9–11.1)
RBC # BLD AUTO: 2.4 M/UL (ref 3.8–5.2)
WBC # BLD AUTO: 7.9 K/UL (ref 3.6–11)

## 2021-06-02 PROCEDURE — 97535 SELF CARE MNGMENT TRAINING: CPT

## 2021-06-02 PROCEDURE — 74011000250 HC RX REV CODE- 250: Performed by: ORTHOPAEDIC SURGERY

## 2021-06-02 PROCEDURE — 85610 PROTHROMBIN TIME: CPT

## 2021-06-02 PROCEDURE — 97530 THERAPEUTIC ACTIVITIES: CPT

## 2021-06-02 PROCEDURE — 94640 AIRWAY INHALATION TREATMENT: CPT

## 2021-06-02 PROCEDURE — 74011250636 HC RX REV CODE- 250/636: Performed by: ORTHOPAEDIC SURGERY

## 2021-06-02 PROCEDURE — 36415 COLL VENOUS BLD VENIPUNCTURE: CPT

## 2021-06-02 PROCEDURE — 99232 SBSQ HOSP IP/OBS MODERATE 35: CPT | Performed by: INTERNAL MEDICINE

## 2021-06-02 PROCEDURE — 83735 ASSAY OF MAGNESIUM: CPT

## 2021-06-02 PROCEDURE — 74011250637 HC RX REV CODE- 250/637: Performed by: INTERNAL MEDICINE

## 2021-06-02 PROCEDURE — 97116 GAIT TRAINING THERAPY: CPT

## 2021-06-02 PROCEDURE — 74011250637 HC RX REV CODE- 250/637: Performed by: NURSE PRACTITIONER

## 2021-06-02 PROCEDURE — 85025 COMPLETE CBC W/AUTO DIFF WBC: CPT

## 2021-06-02 PROCEDURE — 74011250637 HC RX REV CODE- 250/637: Performed by: ORTHOPAEDIC SURGERY

## 2021-06-02 PROCEDURE — 94760 N-INVAS EAR/PLS OXIMETRY 1: CPT

## 2021-06-02 PROCEDURE — 77030038269 HC DRN EXT URIN PURWCK BARD -A

## 2021-06-02 PROCEDURE — 85014 HEMATOCRIT: CPT

## 2021-06-02 PROCEDURE — 65660000000 HC RM CCU STEPDOWN

## 2021-06-02 PROCEDURE — 94664 DEMO&/EVAL PT USE INHALER: CPT

## 2021-06-02 RX ORDER — METOPROLOL TARTRATE 50 MG/1
50 TABLET ORAL 2 TIMES DAILY
Status: DISCONTINUED | OUTPATIENT
Start: 2021-06-02 | End: 2021-06-02

## 2021-06-02 RX ORDER — LISINOPRIL 20 MG/1
20 TABLET ORAL EVERY EVENING
Status: DISCONTINUED | OUTPATIENT
Start: 2021-06-02 | End: 2021-06-03

## 2021-06-02 RX ORDER — METOPROLOL TARTRATE 50 MG/1
50 TABLET ORAL ONCE
Status: COMPLETED | OUTPATIENT
Start: 2021-06-02 | End: 2021-06-02

## 2021-06-02 RX ORDER — ASPIRIN 81 MG/1
81 TABLET ORAL DAILY
Status: DISCONTINUED | OUTPATIENT
Start: 2021-06-03 | End: 2021-06-05 | Stop reason: HOSPADM

## 2021-06-02 RX ORDER — METOPROLOL TARTRATE 25 MG/1
25 TABLET, FILM COATED ORAL ONCE
Status: DISCONTINUED | OUTPATIENT
Start: 2021-06-02 | End: 2021-06-02

## 2021-06-02 RX ADMIN — ACETAMINOPHEN 650 MG: 325 TABLET ORAL at 16:31

## 2021-06-02 RX ADMIN — ACETAMINOPHEN 650 MG: 325 TABLET ORAL at 05:12

## 2021-06-02 RX ADMIN — FAMOTIDINE 20 MG: 20 TABLET ORAL at 17:54

## 2021-06-02 RX ADMIN — Medication 10 ML: at 14:20

## 2021-06-02 RX ADMIN — Medication 10 ML: at 14:05

## 2021-06-02 RX ADMIN — Medication 10 ML: at 05:12

## 2021-06-02 RX ADMIN — CELECOXIB 200 MG: 100 CAPSULE ORAL at 09:38

## 2021-06-02 RX ADMIN — PHENOBARBITAL 129.6 MG: 32.4 TABLET ORAL at 21:32

## 2021-06-02 RX ADMIN — METOPROLOL TARTRATE 50 MG: 50 TABLET, FILM COATED ORAL at 09:48

## 2021-06-02 RX ADMIN — WARFARIN SODIUM 6 MG: 5 TABLET ORAL at 17:53

## 2021-06-02 RX ADMIN — CELECOXIB 200 MG: 100 CAPSULE ORAL at 17:54

## 2021-06-02 RX ADMIN — ARFORMOTEROL TARTRATE: 15 SOLUTION RESPIRATORY (INHALATION) at 20:43

## 2021-06-02 RX ADMIN — Medication 10 ML: at 05:13

## 2021-06-02 RX ADMIN — Medication 81 MG: at 09:37

## 2021-06-02 RX ADMIN — Medication 10 ML: at 21:32

## 2021-06-02 RX ADMIN — ACETAMINOPHEN 650 MG: 325 TABLET ORAL at 21:32

## 2021-06-02 RX ADMIN — ARFORMOTEROL TARTRATE: 15 SOLUTION RESPIRATORY (INHALATION) at 07:15

## 2021-06-02 RX ADMIN — ENOXAPARIN SODIUM 40 MG: 40 INJECTION SUBCUTANEOUS at 09:37

## 2021-06-02 RX ADMIN — LISINOPRIL 20 MG: 20 TABLET ORAL at 17:55

## 2021-06-02 RX ADMIN — METOPROLOL TARTRATE 25 MG: 25 TABLET, FILM COATED ORAL at 05:12

## 2021-06-02 RX ADMIN — METOPROLOL TARTRATE 75 MG: 50 TABLET, FILM COATED ORAL at 17:54

## 2021-06-02 RX ADMIN — ACETAMINOPHEN 650 MG: 325 TABLET ORAL at 09:38

## 2021-06-02 RX ADMIN — DOCUSATE SODIUM 50MG AND SENNOSIDES 8.6MG 1 TABLET: 8.6; 5 TABLET, FILM COATED ORAL at 09:38

## 2021-06-02 RX ADMIN — DOCUSATE SODIUM 50MG AND SENNOSIDES 8.6MG 1 TABLET: 8.6; 5 TABLET, FILM COATED ORAL at 17:53

## 2021-06-02 RX ADMIN — FAMOTIDINE 20 MG: 20 TABLET ORAL at 09:41

## 2021-06-02 NOTE — PROGRESS NOTES
[facility] [address] 462-2787  Mobile [phone]    Cardiology Progress Note    Date of  Admission: 2021  1:06 PM  PCP- MD Behzad Matthews MD  :  1955   MRN:  [id number]     CC: s/p fall   Reason for consult: SVT/palpitations  Admission Diagnosis: Humerus fracture [S42.309A]    Hamida Leonardo is a 76 y.o. female admitted for Humerus fracture [S42.309A]. Consult requested by Nirali Laughlin DO       Subjective:   Humerus fracture [S42.309A]        Impression Plan/Recommendation   1. PAF  2. S/p right femur frx s/p repair  3. Anemia  4. Hypertension   5. Hx of epileptic seizures                 · ecg nsr, qtc 480 msec, only on pepcid. No prior ecg to compare  · Tolerated BB- still w 2 burst of AF early this AM- patient asymptomatic will increase BB t 75 mg bid. Decrease lisinopril to 20  · CHADS2VASC-3, started coumadin last night. Unfortunately she cannot be on a DOAC d/t interaction w phenobarbital.   · Will need to d/c lovenox and asa once inr is btw 2-3  · TSH wnl  ·  loop on d/c- ordered    Will arrange cardiology follow up w Dr Mandy Valerio. No further cardiac recommendations at this time  Will see prn     Future Appointments   Date Time Provider Stuart Duncan   2021 10:20 AM Michelle Qauch MD [facility] BS AMB           Pt personally seen and examined. Chart reviewed. Agree with advanced NP's history, exam and  A/P with changes/additons. Blood pressure 118/62, pulse 86, temperature 99 °F (37.2 °C), resp. rate 16, height 5' 3\" (1.6 m), weight 200 lb (90.7 kg), SpO2 96 %. CVS-S1-S2 present, no murmur       A/P - PAF- on B Blockers/coumadin    21    ECHO ADULT COMPLETE 2021    Interpretation Summary  · LV: Estimated LVEF is 55 - 60%. Normal cavity size, wall thickness and systolic function (ejection fraction normal).  Wall motion: normal.  · LA: Mildly dilated left atrium. · AV: Probably trileaflet aortic valve. · MV: Mild mitral valve regurgitation is present. Signed by: Phil Renee MD on 6/1/2021  4:09 PM        Ashvin Renee MD, [facility] [place]          Subjective:  Pankaj Brian is a 76 y.o. female denies palpitations, irregular heart beat, SOB, chest pain or LE edema.          Past Medical History:   Diagnosis Date    Hyperlipidemia     Hypertension     Seizures ([address]       Past Surgical History:   Procedure Laterality Date   [de-identified] GYN      HX [facility] Medications:  Current Facility-Administered Medications   Medication Dose Route Frequency    metoprolol tartrate (LOPRESSOR) tablet 50 mg  50 mg Oral BID    metoprolol tartrate (LOPRESSOR) tablet 25 mg  25 mg Oral ONCE    Warfarin - Pharmacy to dose   Other Rx Dosing/Monitoring    sodium chloride (NS) flush 5-40 mL  5-40 mL IntraVENous Q8H    sodium chloride (NS) flush 5-40 mL  5-40 mL IntraVENous PRN    acetaminophen (TYLENOL) tablet 650 mg  650 mg Oral Q6H    oxyCODONE IR (ROXICODONE) tablet 5 mg  5 mg Oral Q3H PRN    oxyCODONE IR (ROXICODONE) tablet 10 mg  10 mg Oral Q3H PRN    celecoxib (CELEBREX) capsule 200 mg  200 mg Oral BID    naloxone (NARCAN) injection 0.4 mg  0.4 mg IntraVENous PRN    famotidine (PEPCID) tablet 20 mg  20 mg Oral BID    senna-docusate (PERICOLACE) 8.6-50 mg per tablet 1 Tablet  1 Tablet Oral BID    polyethylene glycol (MIRALAX) packet 17 g  17 g Oral DAILY    bisacodyL (DULCOLAX) suppository 10 mg  10 mg Rectal DAILY PRN    aspirin delayed-release tablet 81 mg  81 mg Oral BID    0.9% sodium chloride infusion 250 mL  250 mL IntraVENous PRN    sodium chloride (NS) flush 5-40 mL  5-40 mL IntraVENous Q8H    sodium chloride (NS) flush 5-40 mL  5-40 mL IntraVENous PRN    acetaminophen (TYLENOL) tablet 650 mg  650 mg Oral Q6H PRN    Or    acetaminophen (TYLENOL) suppository 650 mg  650 mg Rectal Q6H PRN    polyethylene glycol (MIRALAX) packet 17 g  17 g Oral DAILY PRN    promethazine (PHENERGAN) tablet 12.5 mg  12.5 mg Oral Q6H PRN    Or    ondansetron (ZOFRAN) injection 4 mg  4 mg IntraVENous Q6H PRN    enoxaparin (LOVENOX) injection 40 mg  40 mg SubCUTAneous DAILY    albuterol (PROVENTIL VENTOLIN) nebulizer solution 2.5 mg  2.5 mg Nebulization Q4H PRN    PHENobarbitaL (LUMINAL) tablet 129.6 mg  129.6 mg Oral QHS    lisinopriL (PRINIVIL, ZESTRIL) tablet 40 mg  40 mg Oral QPM    arformoterol 15 mcg/budesonide 0.5 mg neb solution   Nebulization BID RT     No current facility-administered medications on file prior to encounter. Current Outpatient Medications on File Prior to Encounter   Medication Sig Dispense Refill    albuterol (PROVENTIL HFA, VENTOLIN HFA, PROAIR HFA) 90 mcg/actuation inhaler Take 2 Puffs by inhalation every four (4) hours as needed for Wheezing.  amLODIPine (NORVASC) 5 mg tablet Take 5 mg by mouth every evening.  diclofenac EC (VOLTAREN) 75 mg EC tablet Take 75 mg by mouth two (2) times a day. Takes with Tylenol Arthritis      fluticasone propion-salmeteroL (Wixela Inhub) 250-50 mcg/dose diskus inhaler Take 1 Puff by inhalation every twelve (12) hours.  lisinopril-hydroCHLOROthiazide (PRINZIDE, ZESTORETIC) 20-12.5 mg per tablet Take 2 Tablets by mouth every evening.  PHENobarbitaL (LUMINAL) 32.4 mg tablet Take 129.6 mg by mouth nightly. 129.6 mg = 4 tablets      acetaminophen (Tylenol Arthritis Pain) 650 mg TbER Take 1,300 mg by mouth two (2) times a day. Takes with diclofenac           Allergies   Allergen Reactions    Seasonale [Levonorgestrel-Ethinyl Estrad] Runny Nose             Review of Symptoms:  All other systems reviewed are negative except as above.     Physical Exam    Visit Vitals  /62 (BP 1 Location: Left upper arm, BP Patient Position: At rest)   Pulse 86   Temp 99 °F (37.2 °C)   Resp 16   Ht 5' 3\" (1.6 m)   Wt 200 lb (90.7 kg)   SpO2 96%   BMI 35.43 kg/m² General Appearance:  Well developed, obese,alert and oriented x 3, and individual in no acute distress. Ears/Nose/Mouth/Throat:   Hearing grossly normal.Normal oral mucosa,no scleral icterus     Neck: Supple no JVD    Chest:   Lungs clear to auscultation bilaterally,no rales rhonchi or wheezing   Cardiovascular:  Regular rate and rhythm, 2/6 Murmur rsb. Abdomen:   Soft, non-tender. Extremities: No edema bilaterally. Pulses detected, no varicosities   Skin: Warm and dry. No bruising  Neuro  Moves all extermities and neurologically intact                                                       Cardiographics    Telemetry: NSR, PAF                Cardiac testing      No results for input(s): CPK, CKMB, TROIQ in the last 72 hours. No lab exists for component: CKQMB, CPKMB    Recent Labs     06/02/21  0530 06/01/21  0503 05/31/21  0115   NA  --  135* 137   K  --  3.8 3.4*   CO2  --  22 23   BUN  --  13 12   CREA  --  0.54* 0.60   GLU  --  104* 119*   MG 2.2 2.5* 2.3   WBC 7.9 10.7 13.3*   HGB 7.4* 8.4* 8.0*   HCT 23.5* 26.7* 24.6*    225 197       I have discussed the diagnosis with the patient and the intended plan as seen in the above orders. Questions were answered concerning future plans. I have discussed medication side effects and warnings with the patient as well. Malika Grimes is in agreement to the plan listed above and wishes to proceed. she  was instructed not to smoke, eat heart healthy diet  and to exercise. Thank you for this consult.     Smitha Mai NP

## 2021-06-02 NOTE — PROGRESS NOTES
Problem: Mobility Impaired (Adult and Pediatric)  Goal: *Acute Goals and Plan of Care (Insert Text)  Outcome: Progressing Towards Goal  Note:   PHYSICAL THERAPY TREATMENT  Patient: Jazzmine Horton (67 y.o. female)  Date: 6/2/2021  Diagnosis: Humerus fracture [S42.309A] <principal problem not specified>  Procedure(s) (LRB):  RIGHT DISTAL FEMUR OPEN REDUCTION INTERNAL FIXATION (Right) 4 Days Post-Op  Precautions: Fall (NWB RUE; TTWB RLE)  Chart, physical therapy assessment, plan of care and goals were reviewed. ASSESSMENT  Patient continues with skilled PT services and slowly progressing towards goals. Overall Mod A x1 for bed mobility and SPT using HW. SOB with  with trasnfers, O2 sat 97% Verbal cues for NWB on RUE. Max A for marylou hygiene. Spouse present throughout session. personal w/c has L sided break that does not lock securely, will need  new w/c for safety as pt can not ambulate due to RUE and RLE WB restrictions. May need w/c or ambulance transport to home as family vehicle is taller truck. Current Level of Function Impacting Discharge (mobility/balance): Mod A     Other factors to consider for discharge:       PLAN :  Patient continues to benefit from skilled intervention to address the above impairments. Continue treatment per established plan of care. to address goals. Recommendation for discharge: (in order for the patient to meet his/her long term goals)  Physical therapy at least 2 days/week in the home AND ensure assist and/or supervision for safety with transfers    This discharge recommendation:  Has been made in collaboration with the attending provider and/or case management    IF patient discharges home will need the following DME: wheelchair with B elevated leg rests and removable arm rests       SUBJECTIVE:   Patient stated i get SOB when I move.     OBJECTIVE DATA SUMMARY:   Critical Behavior:  Neurologic State: Alert  Orientation Level: Oriented X4  Cognition: Appropriate decision making, Appropriate for age attention/concentration, Appropriate safety awareness  Safety/Judgement: Insight into deficits, Good awareness of safety precautions  Functional Mobility Training:  Bed Mobility:     Supine to Sit: Moderate assistance;Assist x1;Additional time     Scooting: Additional time;Minimum assistance        Transfers:  Sit to Stand: Moderate assistance;Assist x1;Additional time  Stand to Sit: Moderate assistance;Assist x1;Additional time                             Balance:  Sitting: Intact  Standing: Impaired; With support  Standing - Static: Constant support; Fair  Standing - Dynamic : Fair;Constant support  Ambulation/Gait Training:  Distance (ft):  (1' and 2' )  Assistive Device: Gait belt;Walker pia  Ambulation - Level of Assistance: Moderate assistance;Assist x1;Additional time                       Speed/Laurence: Slow                       Stairs: Therapeutic Exercises:   Elbow, wrist and hand ROM. Pain Rating:  3/10    Activity Tolerance:   Good and observed SOB with activity    After treatment patient left in no apparent distress:   Sitting in chair, Call bell within reach, and Caregiver / family present    COMMUNICATION/COLLABORATION:   The patients plan of care was discussed with: Registered nurse.      Shaw Casas   Time Calculation: 38 mins

## 2021-06-02 NOTE — PROGRESS NOTES
Bhavin Ndiaye [facility]  [address] [facility], [address] [place] [address]  [phone]      Medical Progress Note      NAME: Andres Gomez   :  1955  MRM:  [id number]    Date of service: 2021  3:14 PM       Assessment and Plan:     Heart Palpitations/ SVT/ PAF:  tsh wnl. Echo with dilated left atrium. Started on warfarin; stop aspirin and Lovenox once therapeutic. Lopressor. Cardiology evaluated; event monitor on DC. Right femoral fracture: evaluated by orthopedics and had ORIF on . Oxycodone Prn and IV dilaudid PRN for pain and monitor for sedation. Right Humerus fracture (2021). Non operative management for now. Sling. NWB RUE. Oxycodone Prn and IV dilaudid PRN for pain and monitor for sedation. Anemia. Significant drop from baseline but has been stable. Likely blood loss from surgery. transfuse as needed to keep Hb<7.     Transaminitis. Resolved. ABD US showed Cholelithiasis. No evidence of acute cholecystitis. acute hepatitis panel is negative. Monitor      Seizure disorder ([facility] [address] (2021): Continue home phenobarbital.     HTN (hypertension) (2021): Continue lisinopril and lopressor.      HLD (hyperlipidemia) (2021): Not on home statin. Subjective:     Chief Complaint[de-identified] Patient was seen and examined as a follow up for humeral and femoral fracture. Chart was reviewed. Endorses some lightheadedness. States pain controlled. ROS:  (bold if positive, if negative)    Tolerating PT  Tolerating Diet  Lightheadedness         Objective:     Last 24hrs VS reviewed since prior progress note.  Most recent are:    Visit Vitals  /70 (BP 1 Location: Left lower arm, BP Patient Position: At rest)   Pulse 74   Temp 98.4 °F (36.9 °C)   Resp 16   Ht 5' 3\" (1.6 m)   Wt 90.7 kg (200 lb)   SpO2 97%   BMI 35.43 kg/m²     SpO2 Readings from Last 6 Encounters:   21 97%    O2 Flow Rate (L/min): 0 l/min       Intake/Output Summary (Last 24 hours) at 6/2/2021 1512  Last data filed at 6/2/2021 1354  Gross per 24 hour   Intake 720 ml   Output 800 ml   Net -80 ml        Physical Exam:    Gen:  Well-developed, well-nourished, in no acute distress  HEENT:  Pink conjunctivae, PERRL, hearing intact to voice, moist mucous membranes  Neck:  Supple, without masses, thyroid non-tender  Resp:  No accessory muscle use, clear breath sounds without wheezes rales or rhonchi  Card:  No murmurs, normal S1, S2 without thrills, bruits or peripheral edema  Abd:  Soft, non-tender, non-distended, normoactive bowel sounds are present, no palpable organomegaly and no detectable hernias  Lymph:  No cervical or inguinal adenopathy  Musc:  No cyanosis or clubbing  Skin:  No rashes or ulcers, skin turgor is good  Neuro:  Cranial nerves are grossly intact, no focal motor weakness, follows commands appropriately  Psych:  Good insight, oriented to person, place and time, alert  __________________________________________________________________  Medications Reviewed: (see below)  Medications:     Current Facility-Administered Medications   Medication Dose Route Frequency    metoprolol tartrate (LOPRESSOR) tablet 75 mg  75 mg Oral BID    lisinopriL (PRINIVIL, ZESTRIL) tablet 20 mg  20 mg Oral QPM    warfarin (COUMADIN) tablet 6 mg  6 mg Oral QPM    [START ON 6/3/2021] aspirin delayed-release tablet 81 mg  81 mg Oral DAILY    Warfarin - Pharmacy to dose   Other Rx Dosing/Monitoring    sodium chloride (NS) flush 5-40 mL  5-40 mL IntraVENous Q8H    sodium chloride (NS) flush 5-40 mL  5-40 mL IntraVENous PRN    acetaminophen (TYLENOL) tablet 650 mg  650 mg Oral Q6H    oxyCODONE IR (ROXICODONE) tablet 5 mg  5 mg Oral Q3H PRN    oxyCODONE IR (ROXICODONE) tablet 10 mg  10 mg Oral Q3H PRN    celecoxib (CELEBREX) capsule 200 mg  200 mg Oral BID    naloxone (NARCAN) injection 0.4 mg  0.4 mg IntraVENous PRN    famotidine (PEPCID) tablet 20 mg  20 mg Oral BID    senna-docusate (PERICOLACE) 8.6-50 mg per tablet 1 Tablet  1 Tablet Oral BID    polyethylene glycol (MIRALAX) packet 17 g  17 g Oral DAILY    bisacodyL (DULCOLAX) suppository 10 mg  10 mg Rectal DAILY PRN    0.9% sodium chloride infusion 250 mL  250 mL IntraVENous PRN    sodium chloride (NS) flush 5-40 mL  5-40 mL IntraVENous Q8H    sodium chloride (NS) flush 5-40 mL  5-40 mL IntraVENous PRN    acetaminophen (TYLENOL) tablet 650 mg  650 mg Oral Q6H PRN    Or    acetaminophen (TYLENOL) suppository 650 mg  650 mg Rectal Q6H PRN    polyethylene glycol (MIRALAX) packet 17 g  17 g Oral DAILY PRN    promethazine (PHENERGAN) tablet 12.5 mg  12.5 mg Oral Q6H PRN    Or    ondansetron (ZOFRAN) injection 4 mg  4 mg IntraVENous Q6H PRN    enoxaparin (LOVENOX) injection 40 mg  40 mg SubCUTAneous DAILY    albuterol (PROVENTIL VENTOLIN) nebulizer solution 2.5 mg  2.5 mg Nebulization Q4H PRN    PHENobarbitaL (LUMINAL) tablet 129.6 mg  129.6 mg Oral QHS    arformoterol 15 mcg/budesonide 0.5 mg neb solution   Nebulization BID RT        Lab Data Reviewed: (see below)  Lab Review:     Recent Labs     06/02/21  1227 06/02/21  0530 06/01/21  0503 05/31/21  0115   WBC  --  7.9 10.7 13.3*   HGB 7.9* 7.4* 8.4* 8.0*   HCT 24.8* 23.5* 26.7* 24.6*   PLT  --  240 225 197     Recent Labs     06/02/21  0530 06/01/21  1725 06/01/21  0503 05/31/21  0115   NA  --   --  135* 137   K  --   --  3.8 3.4*   CL  --   --  105 106   CO2  --   --  22 23   GLU  --   --  104* 119*   BUN  --   --  13 12   CREA  --   --  0.54* 0.60   CA  --   --  8.3* 7.8*   MG 2.2  --  2.5* 2.3   ALB  --   --   --  3.0*   TBILI  --   --   --  0.6   ALT  --   --   --  45   INR 1.0 1.0  --   --      No results found for: GLUCPOC  No results for input(s): PH, PCO2, PO2, HCO3, FIO2 in the last 72 hours.   Recent Labs     06/02/21  0530 06/01/21  1725   INR 1.0 1.0     All Micro Results     Procedure Component Value Units Date/Time    COVID-19 RAPID TEST [[id number]] Collected: 05/28/21 8251 Order Status: Completed Specimen: Nasopharyngeal Updated: 05/29/21 0043     Specimen source Nasopharyngeal        COVID-19 rapid test Not detected        Comment: Rapid Abbott ID Now       Rapid NAAT:  The specimen is NEGATIVE for SARS-CoV-2, the novel coronavirus associated with COVID-19. Negative results should be treated as presumptive and, if inconsistent with clinical signs and symptoms or necessary for patient management, should be tested with an alternative molecular assay. Negative results do not preclude SARS-CoV-2 infection and should not be used as the sole basis for patient management decisions. This test has been authorized by the FDA under an Emergency Use Authorization (EUA) for use by authorized laboratories. Fact sheet for Healthcare Providers: ConventionUpdate.co.nz  Fact sheet for Patients: ConventionUpdate.co.nz       Methodology: Isothermal Nucleic Acid Amplification               I have reviewed notes of prior 24hr. Other pertinent lab: Total time spent with patient: 28 I personally reviewed chart, notes, data and current medications in the medical record. I have personally examined and treated the patient at bedside during this period.                  Care Plan discussed with: Patient, Family, Nursing Staff and >50% of time spent in counseling and coordination of care    Discussed:  Care Plan    Prophylaxis:  Lovenox, transitioning to warfarin     Disposition:  [facility] PT, OT, RN            ___________________________________________________    Attending Physician: Jonas Chowdhury DO

## 2021-06-02 NOTE — PROGRESS NOTES
6/2/2021  2:21 PM  RUR:  10%  Risk Level: [x]Low []Moderate []High  Value-based purchasing: [] Yes [x] No  Bundle patient: [] Yes [x] No   Specify:     Transition of care plan:  1. Awaiting medical clearance and DC order. PT/OT treating. Pt will DC when INR is therapeutic per attending. 2. Home with [facility] with [facility]. Pt's wheelchair repaired in room where wheel locks are functional.   3. Outpatient follow-up. 4. Wheelchair transport likely required.

## 2021-06-02 NOTE — PROGRESS NOTES
Problem: Self Care Deficits Care Plan (Adult)  Goal: *Acute Goals and Plan of Care (Insert Text)  Description:   FUNCTIONAL STATUS PRIOR TO ADMISSION: Patient was independent and active without use of DME. Patient was independent for basic and instrumental ADLs. HOME SUPPORT: The patient lived with  but did not require assist.    Occupational Therapy Goals  Initiated 5/31/2021  1. Patient will perform grooming, seated, with supervision/set-up using R UE as fine motor assist within 7 day(s). 2.  Patient will perform upper body ADLs with minimal assistance/contact guard assist within 7 day(s). 3.  Patient will don/doff arm sling at minimal assistance/contact guard assist level within 7 days. 4.  Patient will perform toilet transfers to [facility] with minimal assistance/contact guard assist within 7 day(s). 5.  Patient will perform all aspects of toileting with minimal assistance within 7 day(s). 6.  Patient will utilize energy conservation techniques during functional activities with verbal cues within 7 day(s). Outcome: Progressing Towards Goal   OCCUPATIONAL THERAPY TREATMENT  Patient: Anthony Amaro (43 y.o. female)  Date: 6/2/2021  Diagnosis: Humerus fracture [S42.309A] <principal problem not specified>  Procedure(s) (LRB):  RIGHT DISTAL FEMUR OPEN REDUCTION INTERNAL FIXATION (Right) 4 Days Post-Op  Precautions: Fall (NWB RUE; TTWB RLE)  Chart, occupational therapy assessment, plan of care, and goals were reviewed. ASSESSMENT  Patient continues with skilled OT services and is progressing towards goals. Pts  demonstrated donning sling while pt sat in chair. Pt practiced transfer from chair to [facility] and back. After short rest break she returned to bed, lateral transfer. Pt wanted to try her shoe on L foot for transfer and thinks it helped her increase stability. Reviewed importance for finger flex/ext, forearm supination/pronation and elbow flex/ext as well as elevation R UE.  Pt fatigues easily to tasks. Current Level of Function Impacting Discharge (ADLs): Moderate assist BSC transfer, max assist hygiene, max assist LB dress, spouse assists    Other factors to consider for discharge:          PLAN :  Patient continues to benefit from skilled intervention to address the above impairments. Continue treatment per established plan of care to address goals. Recommend with staff: out of bed to chair for ADL's, there ex, there act    Recommend next OT session: Cont towards goals    Recommendation for discharge: (in order for the patient to meet his/her long term goals)  Therapy up to 5 days/week in SNF setting or an intensive home health therapy program    This discharge recommendation:  Has not yet been discussed the attending provider and/or case management    IF patient discharges home will need the following DME:        SUBJECTIVE:   Patient stated I did it this am. referring to transfer to [address] [place] [address] Rd:   Cognitive/Behavioral Status:  Neurologic State: Alert  Orientation Level: Oriented X4  Cognition: Follows commands             Functional Mobility and Transfers for ADLs:  Bed Mobility:  Supine to Sit: Moderate assistance;Assist x1;Additional time  Scooting: Additional time;Minimum assistance    Transfers:  Sit to Stand: Moderate assistance;Assist x1;Additional time  Functional Transfers  Toilet Transfer : Moderate assistance  Cues: Physical assistance  Adaptive Equipment: Bedside commode       Balance:  Sitting: Intact  Standing: Impaired; With support  Standing - Static: Constant support; Fair  Standing - Dynamic : Fair;Constant support    ADL Intervention:       Lower Body Dressing Assistance  Socks:  Total assistance (dependent)         Activity Tolerance:   Fair    After treatment patient left in no apparent distress:   Supine in bed    COMMUNICATION/COLLABORATION:   The patients plan of care was discussed with: Physical therapy assistant, Occupational therapist, and Registered nurse.      JESSE Brooks/L  Time Calculation: 33 mins

## 2021-06-02 NOTE — PROGRESS NOTES
[facility] Dosing Services: 6/2/2021  Consult for Warfarin Dosing by Pharmacy by SHARIFA Chery NP. Consult provided for this 75 yo female for indication of new onset Atrial Fibrillation. Day of Therapy: 2  Dose to achieve an INR goal of 2-3    Order entered for Warfarin  6 (mg) ordered to be given today at 18:00. Significant drug interactions: Asa/Lovenox (dc when INR 2-3 per cards), Phenobarb  Previous dose given 6 mg   PT/INR Lab Results   Component Value Date/Time    INR 1.0 06/02/2021 05:30 AM      Platelets Lab Results   Component Value Date/Time    PLATELET 193 56/32/2464 05:30 AM      H/H Lab Results   Component Value Date/Time    HGB 7.9 (L) 06/02/2021 12:27 PM        Pharmacy to follow daily and will provide subsequent Warfarin dosing based on clinical status.   Heather Tsai  Contact information [phone]

## 2021-06-02 NOTE — PROGRESS NOTES
Orthopaedic Progress Note  Post Op day: 4 Days Post-Op    June 2, 2021 12:30 PM     Patient: Sera Gupta MRN: [id number]  SSN: [id number]    YOB: 1955  Age: 76 y.o. Sex: female      Admit date:  5/28/2021  Date of Surgery:  5/29/2021   Procedures:  Procedure(s):  RIGHT DISTAL FEMUR OPEN REDUCTION INTERNAL FIXATION  Admitting Physician:  Vineet Davis DO   Surgeon:  Jean Garay) and Role:     Debby Hussein MD - Primary    Consulting Physician(s): Treatment Team: Attending Provider: Orlando Stone DO; Consulting Provider: Robin Opitz, MD; Consulting Provider: Orlando Stone DO; Consulting Provider: JANETTE Leon; Utilization Review: Christian Canela; Care Manager: Diamond Ahumada Consulting Provider: Greg Roland MD; Staff Nurse: Katerina Barboza LPN    SUBJECTIVE:     Sera Gupta is a 76 y.o. female is 4 Days Post-Op s/p Procedure(s):  RIGHT DISTAL FEMUR OPEN REDUCTION INTERNAL FIXATION with an appropriate level of post-operative pain. She is sitting in a chair at bedside. She has minimal complaints today. Sling is off and she was elbow flexing and extending in the room. No complaints of nausea, vomiting, dizziness, lightheadedness, chest pain, or shortness of breath. OBJECTIVE:       Physical Exam:  General: Alert, cooperative, no distress. Respiratory: Respirations unlabored  Neurological:  Neurovascular exam within normal limits. Motor: + DF/PF. Musculoskeletal: Calves soft, supple, non-tender upon palpation. Right hand with edema. Able to flex and extend at the elbow. Edema from mid humerus to hand. 1+ pitting in the forearm. +radial and ulnar pulses. +DP pulse with BCR of the toes of the right foot. Dressing/Wound:  Clean, dry and intact. No significant erythema. Moderate right thigh swelling, but thigh is soft and compressible. No erythema.         Vital Signs:        Patient Vitals for the past 8 hrs:   BP Temp Pulse Resp SpO2 21 1212 120/70 98.4 °F (36.9 °C) 74 16 97 %   21 0828 118/62 99 °F (37.2 °C) 86 16 96 %   21 0722     96 %   21 0715     96 %   21 0700   80                                            Temp (24hrs), Av.8 °F (37.1 °C), Min:98.4 °F (36.9 °C), Max:99 °F (37.2 °C)      Labs:        Recent Labs     21  0530   HCT 23.5*   HGB 7.4*   INR 1.0     Lab Results   Component Value Date/Time    Sodium 135 (L) 2021 05:03 AM    Potassium 3.8 2021 05:03 AM    Chloride 105 2021 05:03 AM    CO2 22 2021 05:03 AM    Glucose 104 (H) 2021 05:03 AM    BUN 13 2021 05:03 AM    Creatinine 0.54 (L) 2021 05:03 AM    Calcium 8.3 (L) 2021 05:03 AM       PT/OT:        Gait  Base of Support: Widened  Speed/Laurence: Slow  Step Length: Right shortened, Left shortened  Stance: Right decreased  Gait Abnormalities: Decreased step clearance, Step to gait  Ambulation - Level of Assistance: Moderate assistance, Assist x1, Additional time  Distance (ft):  (1' and 2' )  Assistive Device: Gait belt, Walker pia       Patient mobility  Bed Mobility Training  Supine to Sit: Moderate assistance, Assist x1, Additional time  Scooting: Additional time, Minimum assistance  Transfer Training  Sit to Stand: Moderate assistance, Assist x1, Additional time  Stand to Sit: Moderate assistance, Assist x1, Additional time  Bed to Chair: Assist x2, Moderate assistance      Gait Training  Assistive Device: Gait belt, Walker pia  Ambulation - Level of Assistance: Moderate assistance, Assist x1, Additional time  Distance (ft):  (1' and 2' )   Weight Bearing Status  Right Side Weight Bearing: Toe touch        ASSESSMENT / PLAN:   Active Problems:    Humerus fracture (2021)      HTN (hypertension) (2021)      HLD (hyperlipidemia) (2021)      Seizure disorder ([facility] [address] (2021)      Elevated LFTs (2021)            A: 1. S/P Right distal femur ORIF POD 4  2. Right proximal humerus fracture apex angulated on xray  3. PAF on coumadin now    P: 1. Right leg: TTWB. Knee ROM 0-45 NWB. 2. Right shoulder: NWB. Elbow ROM as tolerated. Finger/wrist ROM as tolerated. 3. DVT ppx: Lovenox until INR 2-3 then transition to Coumadin and ASA. 4. DISPO: Prefer home, but will likely need SNF  5. F/U with Dr. Emily Martin.       Signed By:  Margaret Stinson, [address] [id number]

## 2021-06-02 NOTE — PROGRESS NOTES
Problem: Falls - Risk of  Goal: *Absence of Falls  Description: Document Rosemary Hoyos Fall Risk and appropriate interventions in the flowsheet.   Outcome: Progressing Towards Goal  Note: Fall Risk Interventions:  Mobility Interventions: Patient to call before getting OOB         Medication Interventions: Patient to call before getting OOB    Elimination Interventions: Call light in reach    History of Falls Interventions: Bed/chair exit alarm

## 2021-06-03 LAB
BASOPHILS # BLD: 0.1 K/UL (ref 0–0.1)
BASOPHILS NFR BLD: 1 % (ref 0–1)
DIFFERENTIAL METHOD BLD: ABNORMAL
EOSINOPHIL # BLD: 0.2 K/UL (ref 0–0.4)
EOSINOPHIL NFR BLD: 3 % (ref 0–7)
ERYTHROCYTE [DISTWIDTH] IN BLOOD BY AUTOMATED COUNT: 14.6 % (ref 11.5–14.5)
HCT VFR BLD AUTO: 23.3 % (ref 35–47)
HGB BLD-MCNC: 7.2 G/DL (ref 11.5–16)
IMM GRANULOCYTES # BLD AUTO: 0.1 K/UL (ref 0–0.04)
IMM GRANULOCYTES NFR BLD AUTO: 2 % (ref 0–0.5)
INR PPP: 1.3 (ref 0.9–1.1)
LYMPHOCYTES # BLD: 1.4 K/UL (ref 0.8–3.5)
LYMPHOCYTES NFR BLD: 18 % (ref 12–49)
MAGNESIUM SERPL-MCNC: 2.1 MG/DL (ref 1.6–2.4)
MCH RBC QN AUTO: 30.8 PG (ref 26–34)
MCHC RBC AUTO-ENTMCNC: 30.9 G/DL (ref 30–36.5)
MCV RBC AUTO: 99.6 FL (ref 80–99)
MONOCYTES # BLD: 0.5 K/UL (ref 0–1)
MONOCYTES NFR BLD: 7 % (ref 5–13)
NEUTS SEG # BLD: 5.5 K/UL (ref 1.8–8)
NEUTS SEG NFR BLD: 69 % (ref 32–75)
NRBC # BLD: 0.02 K/UL (ref 0–0.01)
NRBC BLD-RTO: 0.3 PER 100 WBC
PLATELET # BLD AUTO: 273 K/UL (ref 150–400)
PMV BLD AUTO: 10.7 FL (ref 8.9–12.9)
PROTHROMBIN TIME: 13.1 SEC (ref 9–11.1)
RBC # BLD AUTO: 2.34 M/UL (ref 3.8–5.2)
WBC # BLD AUTO: 7.8 K/UL (ref 3.6–11)

## 2021-06-03 PROCEDURE — 74011250637 HC RX REV CODE- 250/637: Performed by: NURSE PRACTITIONER

## 2021-06-03 PROCEDURE — 74011250637 HC RX REV CODE- 250/637: Performed by: INTERNAL MEDICINE

## 2021-06-03 PROCEDURE — 77030038269 HC DRN EXT URIN PURWCK BARD -A

## 2021-06-03 PROCEDURE — 36415 COLL VENOUS BLD VENIPUNCTURE: CPT

## 2021-06-03 PROCEDURE — 74011250637 HC RX REV CODE- 250/637: Performed by: ORTHOPAEDIC SURGERY

## 2021-06-03 PROCEDURE — 74011000250 HC RX REV CODE- 250: Performed by: ORTHOPAEDIC SURGERY

## 2021-06-03 PROCEDURE — 94640 AIRWAY INHALATION TREATMENT: CPT

## 2021-06-03 PROCEDURE — 85610 PROTHROMBIN TIME: CPT

## 2021-06-03 PROCEDURE — 74011250636 HC RX REV CODE- 250/636: Performed by: ORTHOPAEDIC SURGERY

## 2021-06-03 PROCEDURE — 97530 THERAPEUTIC ACTIVITIES: CPT

## 2021-06-03 PROCEDURE — 94760 N-INVAS EAR/PLS OXIMETRY 1: CPT

## 2021-06-03 PROCEDURE — 83735 ASSAY OF MAGNESIUM: CPT

## 2021-06-03 PROCEDURE — 85025 COMPLETE CBC W/AUTO DIFF WBC: CPT

## 2021-06-03 PROCEDURE — 65660000000 HC RM CCU STEPDOWN

## 2021-06-03 RX ORDER — LISINOPRIL 5 MG/1
15 TABLET ORAL EVERY EVENING
Status: DISCONTINUED | OUTPATIENT
Start: 2021-06-04 | End: 2021-06-05 | Stop reason: HOSPADM

## 2021-06-03 RX ADMIN — METOPROLOL TARTRATE 75 MG: 50 TABLET, FILM COATED ORAL at 08:57

## 2021-06-03 RX ADMIN — ARFORMOTEROL TARTRATE: 15 SOLUTION RESPIRATORY (INHALATION) at 07:44

## 2021-06-03 RX ADMIN — ACETAMINOPHEN 650 MG: 325 TABLET ORAL at 04:56

## 2021-06-03 RX ADMIN — Medication 10 ML: at 04:57

## 2021-06-03 RX ADMIN — CELECOXIB 200 MG: 100 CAPSULE ORAL at 17:29

## 2021-06-03 RX ADMIN — LISINOPRIL 20 MG: 20 TABLET ORAL at 17:29

## 2021-06-03 RX ADMIN — CELECOXIB 200 MG: 100 CAPSULE ORAL at 08:57

## 2021-06-03 RX ADMIN — WARFARIN SODIUM 6 MG: 5 TABLET ORAL at 17:29

## 2021-06-03 RX ADMIN — ENOXAPARIN SODIUM 40 MG: 40 INJECTION SUBCUTANEOUS at 08:59

## 2021-06-03 RX ADMIN — FAMOTIDINE 20 MG: 20 TABLET ORAL at 08:58

## 2021-06-03 RX ADMIN — FAMOTIDINE 20 MG: 20 TABLET ORAL at 17:29

## 2021-06-03 RX ADMIN — Medication 10 ML: at 17:30

## 2021-06-03 RX ADMIN — Medication 81 MG: at 08:58

## 2021-06-03 RX ADMIN — ARFORMOTEROL TARTRATE: 15 SOLUTION RESPIRATORY (INHALATION) at 21:15

## 2021-06-03 RX ADMIN — Medication 10 ML: at 20:32

## 2021-06-03 RX ADMIN — ACETAMINOPHEN 650 MG: 325 TABLET ORAL at 20:29

## 2021-06-03 RX ADMIN — METOPROLOL TARTRATE 75 MG: 50 TABLET, FILM COATED ORAL at 17:29

## 2021-06-03 RX ADMIN — ACETAMINOPHEN 650 MG: 325 TABLET ORAL at 17:29

## 2021-06-03 RX ADMIN — ACETAMINOPHEN 650 MG: 325 TABLET ORAL at 09:00

## 2021-06-03 RX ADMIN — DOCUSATE SODIUM 50MG AND SENNOSIDES 8.6MG 1 TABLET: 8.6; 5 TABLET, FILM COATED ORAL at 17:29

## 2021-06-03 RX ADMIN — PHENOBARBITAL 129.6 MG: 32.4 TABLET ORAL at 20:29

## 2021-06-03 NOTE — PROGRESS NOTES
Problem: Mobility Impaired (Adult and Pediatric)  Goal: *Acute Goals and Plan of Care (Insert Text)  Description: Problem: Mobility Impaired (Adult and Pediatric)  Goal: *Acute Goals and Plan of Care (Insert Text)  Note: FUNCTIONAL STATUS PRIOR TO ADMISSION: Patient was independent and active without use of DME.     HOME SUPPORT PRIOR TO ADMISSION: The patient lived with  but did not require assist.     Physical Therapy Goals  Initiated 5/31/2021  1. Patient will move from supine to sit and sit to supine  in bed with modified independence within 7 day(s). 2.  Patient will transfer from bed to chair and chair to bed with supervision/set-up using the least restrictive device within 7 day(s). 3.  Patient will perform sit to stand with supervision/set-up within 7 day(s). 4.  Patient will ambulate with minimal assistance/contact guard assist for 5 feet with the least restrictive device within 7 day(s). Outcome: Progressing Towards Goal     PHYSICAL THERAPY TREATMENT  Patient: Jazzmine Horton (68 y.o. female)  Date: 6/3/2021  Diagnosis: Humerus fracture [S42.309A] <principal problem not specified>  Procedure(s) (LRB):  RIGHT DISTAL FEMUR OPEN REDUCTION INTERNAL FIXATION (Right) 5 Days Post-Op  Precautions: Fall (NWB RUE; TTWB RLE)  Chart, physical therapy assessment, plan of care and goals were reviewed. ASSESSMENT  Patient continues with skilled PT services and is progressing towards goals. PT greeted today sitting up in recliner chair. W/C for home use was delivered from family friend and brake repaired last night. Pt verbalized willingness to practice transfers to and from w/c during today's session.  present and instructed on w/c position set up and armrest removed and w/c positioned on pt's L. Pt transferred to standing with hemiwalker in LUE with Mod A but was unable to advance (scoot) L foot past w/c front wheel. She then sat and performed SQPT towards L.  Pt unable to clear w/c wheel after 2 attempts and required 3-4 scoots to position. One person needed to block w/c from sliding and second person required to assist pt. Room and furniture rearranged to allow pt to transfer towards L again back to chair. Chair arm dropped and pt scooted with Min/Mod A to chair. Pt fatigued post transfer. Additionally, pt required max cues to avoid use of RUE during transfer. Suggest attempting slide board transfers prior to dc d/t concern and difficulty clearing wheel during transfers. Pt and  in agreement. Acute PT will continue to follow pt while she remains in the acute setting. Rec SNF vs home with HHPT and 2 person assist for all mobility    Current Level of Function Impacting Discharge (mobility/balance): Mod A x 2 for transfers to and from w/c    Other factors to consider for discharge:          PLAN :  Patient continues to benefit from skilled intervention to address the above impairments. Continue treatment per established plan of care. to address goals. Recommendation for discharge: (in order for the patient to meet his/her long term goals)  Physical therapy at least 2 days/week in the home AND ensure 2 person assist for safety with all mobility    This discharge recommendation:  Has been made in collaboration with the attending provider and/or case management    IF patient discharges home will need the following DME: to be determined (TBD) possibly transfer bored       SUBJECTIVE:   Patient stated The doctor said I could balance with my right arm.     OBJECTIVE DATA SUMMARY:   Critical Behavior:  Neurologic State: Alert  Orientation Level: Oriented X4  Cognition: Follows commands  Safety/Judgement: Insight into deficits, Good awareness of safety precautions  Functional Mobility Training:  Bed Mobility:                    Transfers:  Sit to Stand: Moderate assistance  Stand to Sit: Moderate assistance  Stand Pivot Transfers:  Moderate assistance;Assist x2 (second person assisting with blocking w/c from sliding)                          Balance:  Sitting: Intact  Standing: Impaired; With support  Standing - Static: Constant support; Fair  Standing - Dynamic : Constant support;Poor  Ambulation/Gait Training:  Distance (ft): 1 Feet (ft)  Assistive Device: Gait belt (pia walker)  Ambulation - Level of Assistance: Moderate assistance                Pain Rating:  Unrated R hip and RUE pain    Activity Tolerance:   Fair and requires frequent rest breaks    After treatment patient left in no apparent distress:   Sitting in chair, Call bell within reach, and Caregiver / family present    COMMUNICATION/COLLABORATION:   The patients plan of care was discussed with: Occupational therapy assistant, Registered nurse, and Case management.      Sandy Hughes PT, DPT   Time Calculation: 40 mins

## 2021-06-03 NOTE — PROGRESS NOTES
Physical Therapy Note    Chart reviewed in prep for PT session. Pt greeted sitting up in chair. Reported to PT that she recently transfers to [facility] and then to chair with RN assistance. Pt requesting to defer PT until later in the day. Will follow up as able. Thank you.     Brooks Davis PT, DPT

## 2021-06-03 NOTE — PROGRESS NOTES
[facility] Pharmacy Dosing Services: 6/2/2021  Consult for Warfarin Dosing by Pharmacy by SHARIFA Mccord NP. Consult provided for this 75 yo female for indication of new onset Atrial Fibrillation. Day of Therapy: 3  Dose to achieve an INR goal of 2-3    Order entered for Warfarin  6 (mg) ordered to be given today at 18:00. Significant drug interactions: Asa/Lovenox (dc when INR 2-3 per cards), Phenobarb  Previous dose given 6 mg   PT/INR Lab Results   Component Value Date/Time    INR 1.3 (H) 06/03/2021 06:37 AM      Platelets Lab Results   Component Value Date/Time    PLATELET 271 90/00/0810 05:28 AM      H/H Lab Results   Component Value Date/Time    HGB 7.2 (L) 06/03/2021 05:28 AM        Pharmacy to follow daily and will provide subsequent Warfarin dosing based on clinical status.   Sampson Long  Contact information [phone]

## 2021-06-03 NOTE — PROGRESS NOTES
CMS Note  6/3/2021    Patient received and signed their 2nd IMM letter. Patient was provided with a copy for their record.    Beverley Li CMS

## 2021-06-03 NOTE — PROGRESS NOTES
ORTHO PROGRESS NOTE      SUBJECTIVE:  Tomas Perez states her thigh/arm pain are controlled with Tylenol. She doesn't like the sling for her arm. She has all necessary DME for home. OBJECTIVE:  Patient Vitals for the past 24 hrs:   Temp Pulse BP   06/03/21 1105 98.2 °F (36.8 °C) 71 109/68   06/03/21 0732 98.1 °F (36.7 °C) 83 126/68   06/03/21 0659  81    06/03/21 0400  93    06/03/21 0357 98.3 °F (36.8 °C) 90 (!) 110/55   06/03/21 0000  77    06/02/21 2335  75    06/02/21 2218 98.4 °F (36.9 °C) 91 (!) 98/57   06/02/21 2036 98.7 °F (37.1 °C) 76 (!) 113/53   06/02/21 2000  85    06/02/21 1525 98.3 °F (36.8 °C) 73 (!) 122/58       Alert, no distress. Sitting in chair. Family present. Respirations unlabored. RUE ecchymosis and edema as expected. Compartments soft. SILT. Moves elbow, wrist, digits OK. CR < 2 secs. Dressing right thigh CDI. Thigh edema but soft. No pain with gentle A/PROM hip/knee. SILT bilat foot. Calves non-tender. Moves ankles OK. Recent Labs     06/03/21  0637 06/03/21  0528 06/01/21  1725 06/01/21  0503   HGB  --  7.2*   < > 8.4*   HCT  --  23.3*   < > 26.7*   PLT  --  273   < > 225   INR 1.3*  --   --   --    BUN  --   --   --  13   CREA  --   --   --  0.54*   GFRAA  --   --   --  >60   GFRNA  --   --   --  >60    < > = values in this interval not displayed. PT/OT:   Gait:  Gait  Base of Support: Widened  Speed/Laurence: Slow  Step Length: Right shortened, Left shortened  Stance: Right decreased  Gait Abnormalities: Decreased step clearance, Step to gait  Ambulation - Level of Assistance: Moderate assistance, Assist x1, Additional time  Distance (ft):  (1' and 2' )  Assistive Device: Gait belt, Walker pia                 ASSESSMENT:  Procedure: Procedure(s):  RIGHT DISTAL FEMUR OPEN REDUCTION INTERNAL FIXATION  Post Op day: 5 Days Post-Op  Proximal right humerus fracture    PLAN:  PT/OT: CORTNEY WILKES, I prefer sling (at least when OOB).  OK AROM hand, wrist, elbow as vicki.  TTWB RLE. OK knee ROM 0-45. Reji walker LUE. Analgesics: Tylenol  DVT proph: Cardiology recs coumadin long term for arrhythmia. Disp planning. Patient and  prefer to home. They have access to DME except reji walker. F/U: Dr. Jennings Jump team 2 weeks. OK to home from Ortho perspective. Please call again if questions.     JANETTE Aparicio  Orthopedic Trauma Service  [facility] no

## 2021-06-03 NOTE — PROGRESS NOTES
Spiritual Care Assessment/Progress Note  [address]      NAME: Percy Gaston      MRN: [id number]  AGE: 76 y.o. SEX: female  Voodoo Affiliation: [place]out[place]   Language: English     6/3/2021     Total Time (in minutes): 9     Spiritual Assessment begun in [facility] POST SURG ORT 1 through conversation with:         [x]Patient        [] Family    [] Friend(s)        Reason for Consult: Initial/Spiritual assessment, patient floor     Spiritual beliefs: (Please include comment if needed)     [] Identifies with a mone tradition:         [] Supported by a mone community:            [] Claims no spiritual orientation:           [] Seeking spiritual identity:                [] Adheres to an individual form of spirituality:           [x] Not able to assess:                           Identified resources for coping:      [] Prayer                               [] Music                  [] Guided Imagery     [x] Family/friends                 [] Pet visits     [] Devotional reading                         [] Unknown     [] Other:                                             Interventions offered during this visit: (See comments for more details)    Patient Interventions: Catharsis/review of pertinent events in supportive environment, Coping skills reviewed/reinforced           Plan of Care:     [] Support spiritual and/or cultural needs    [] Support AMD and/or advance care planning process      [] Support grieving process   [] Coordinate Rites and/or Rituals    [] Coordination with community clergy   [] No spiritual needs identified at this time   [] Detailed Plan of Care below (See Comments)  [] Make referral to Music Therapy  [] Make referral to Pet Therapy     [] Make referral to Addiction services  [] Make referral to [facility]  [] Make referral to Spiritual Care Partner  [] No future visits requested        [x] Follow up upon further referrals     Comments:  visited Mrs. Grande for an initial spiritual assessment on the Post Surgical Ortho unit. Mrs. Grande was awake, alert, and sitting up in bed when the  came into the room. Her , Anastasiia Peterson, was sitting at the bedside.  introduced herself and they greeted the  with a smile.  was a listening ear as Mrs. Dragan Castro spoke of her recent surgeries and some unexpected health care concerns that were discovered while in the hospital. She spoke of her feelings of gratitude that these concerns were discovered here so she could get them addressed.  continued to be a supportive presence as Mrs. Grande engaged in storytelling and illness review. She thanked the  for stopping by and expressed no additional needs at this time. 's are available for further support upon referral  Douglas Alegre. Sulema Alston.      Paging Service: [phone])

## 2021-06-03 NOTE — PROGRESS NOTES
Comprehensive Nutrition Assessment      Type and Reason for Visit: Initial, RD nutrition re-screen/LOS    Nutrition Recommendations/Plan:   1. Continue Regular diet. 2. Monitor PO intake, weight      Nutrition Assessment:       Pt admitted for Humerus fracture [S42.760A]. Pt  has a past medical history of Hyperlipidemia, Hypertension, and Seizures ([address] .    Pt screened for LOS. Pt POD 5 femur fracture and humeral fracture. Pt with ope reduction internal fixation. Pt is eating well, no dietary concerns other than some food preferences  and pt avoids foods with lactose and doesn't like eggs. Pt denies chew/swallow difficulties. NKFA. No GI complaints. Pt states she had a BM yesterday and one this morning. No wt changes PTA. Noted pt still here for adjustment to coumadin and medication management. Wt Readings from Last 10 Encounters:   06/01/21 90.7 kg (200 lb)       Malnutrition Assessment:  Malnutrition Status:  No malnutrition        Estimated Daily Nutrient Needs:  Energy (kcal):  1840  Protein (g):  91       Fluid (ml/day):  1850    Documented meal intake:   Patient Vitals for the past 168 hrs:   % Diet Eaten   06/02/21 1930 76 - 100%   06/02/21 1354 76 - 100%   06/02/21 1007 76 - 100%   06/01/21 1351 51 - 75%   06/01/21 0858 51 - 75%   05/31/21 1833 76 - 100%   05/31/21 1438 51 - 75%   05/31/21 0839 51 - 75%   05/30/21 1658 51 - 75%   05/30/21 1329 0%   05/30/21 0820 0%   05/29/21 1232 0%   05/29/21 0800 0%       Nutrition Related Findings:     Last BM 6/3, 2+ edema RUE, RLE.      Nutritionally Significant Medications:   Pepcid, Lovenox, Miralax, Pericolace, Coumadin      Wounds:    Surgical incision       Current Nutrition Therapies:  DIET REGULAR    Anthropometric Measures:  · Height:  5' 2.99\" (160 cm)  · Current Body Wt:  90.7 kg (199 lb 15.3 oz)   · Admission Body Wt:  200 lb    · Usual Body Wt:  90.7 kg (200 lb)     · Ideal Body Wt:  115 lbs:  173.9 %   · BMI Category:  Obese class 2 (BMI 35.0-39. 9)       Nutrition Diagnosis:   No nutrition diagnosis at this time     Nutrition Interventions:   Food and/or Nutrient Delivery: Continue current diet  Nutrition Education and Counseling: No recommendations at this time  Coordination of Nutrition Care: Continue to monitor while inpatient, Interdisciplinary rounds    Goals:  Pt will continue to consume >50% of meals to meet estimated needs within 3-5 days       Nutrition Monitoring and Evaluation:   Behavioral-Environmental Outcomes: None identified  Food/Nutrient Intake Outcomes: Food and nutrient intake, Supplement intake  Physical Signs/Symptoms Outcomes: None identified    Discharge Planning:    No discharge needs at this time, Continue current diet     Electronically signed by Montserrat Lopez, [address]     Contact: [phone]

## 2021-06-03 NOTE — PROGRESS NOTES
Shift Change:    Bedside and Verbal shift change report given to HORTENCIA Sharma (oncoming nurse) by Marybeth Somers RN (offgoing nurse). Report included the following information SBAR, Kardex, Procedure Summary, Intake/Output, MAR, Recent Results and Cardiac Rhythm NSR.

## 2021-06-03 NOTE — PROGRESS NOTES
Bhavin Ndiaye [facility] 79  [address] [facility], [place], [address] [place] [address]  [phone]      Medical Progress Note      NAME: Michaela Morataya   :  1955  MRM:  [id number]    Date of service: 6/3/2021  5:56 PM       Assessment and Plan:     Heart Palpitations/ SVT/ PAF:  tsh wnl. Echo with dilated left atrium. Started on warfarin; stop aspirin and Lovenox once warfarin therapeutic. Lopressor. Cardiology evaluated; event monitor on DC. Right femoral fracture: evaluated by orthopedics and had ORIF on . Oxycodone Prn and IV dilaudid PRN for pain and monitor for sedation. Right Humerus fracture (2021). Non operative management for now. Sling. NWB RUE. Oxycodone Prn and IV dilaudid PRN for pain and monitor for sedation. Lightheadedness: check orthostatics. Monitor Hgb. Reduce lisinopril dose. Anemia. Significant drop from baseline but has been stable. Likely blood loss from surgery. transfuse as needed to keep Hb<7.     Transaminitis. Resolved. ABD US showed Cholelithiasis. No evidence of acute cholecystitis. acute hepatitis panel is negative. Monitor      Seizure disorder ([address] (2021): Continue home phenobarbital.     HTN (hypertension) (2021): Reduce lisinopril due to light headedness and c/w lopressor.      HLD (hyperlipidemia) (2021): Not on home statin. Subjective:     Chief Complaint[de-identified] Patient was seen and examined as a follow up for humeral and femoral fracture. Chart was reviewed. Endorses some lightheadedness with movement. States pain controlled. ROS:  (bold if positive, if negative)    Tolerating PT  Tolerating Diet  Lightheadedness         Objective:     Last 24hrs VS reviewed since prior progress note.  Most recent are:    Visit Vitals  /66 (BP 1 Location: Left upper arm, BP Patient Position: At rest)   Pulse 75   Temp 98 °F (36.7 °C)   Resp 16   Ht 5' 2.99\" (1.6 m)   Wt 90.7 kg (200 lb)   SpO2 98%   BMI 35.44 kg/m²     SpO2 Readings from Last 6 Encounters:   06/03/21 98%    O2 Flow Rate (L/min): 0 l/min       Intake/Output Summary (Last 24 hours) at 6/3/2021 1756  Last data filed at 6/3/2021 1547  Gross per 24 hour   Intake 1080 ml   Output 1600 ml   Net -520 ml        Physical Exam:    Gen:  Well-developed, well-nourished, in no acute distress  HEENT:  Pink conjunctivae, PERRL, hearing intact to voice, moist mucous membranes  Neck:  Supple, without masses, thyroid non-tender  Resp:  No accessory muscle use, clear breath sounds without wheezes rales or rhonchi  Card:  No murmurs, normal S1, S2 without thrills, bruits or peripheral edema  Abd:  Soft, non-tender, non-distended, normoactive bowel sounds are present, no palpable organomegaly and no detectable hernias  Lymph:  No cervical or inguinal adenopathy  Musc:  No cyanosis or clubbing  Skin:  No rashes or ulcers, skin turgor is good  Neuro:  Cranial nerves are grossly intact, no focal motor weakness, follows commands appropriately  Psych:  Good insight, oriented to person, place and time, alert  __________________________________________________________________  Medications Reviewed: (see below)  Medications:     Current Facility-Administered Medications   Medication Dose Route Frequency    metoprolol tartrate (LOPRESSOR) tablet 75 mg  75 mg Oral BID    lisinopriL (PRINIVIL, ZESTRIL) tablet 20 mg  20 mg Oral QPM    aspirin delayed-release tablet 81 mg  81 mg Oral DAILY    Warfarin - Pharmacy to dose   Other Rx Dosing/Monitoring    sodium chloride (NS) flush 5-40 mL  5-40 mL IntraVENous Q8H    sodium chloride (NS) flush 5-40 mL  5-40 mL IntraVENous PRN    acetaminophen (TYLENOL) tablet 650 mg  650 mg Oral Q6H    oxyCODONE IR (ROXICODONE) tablet 5 mg  5 mg Oral Q3H PRN    oxyCODONE IR (ROXICODONE) tablet 10 mg  10 mg Oral Q3H PRN    celecoxib (CELEBREX) capsule 200 mg  200 mg Oral BID    naloxone (NARCAN) injection 0.4 mg  0.4 mg IntraVENous PRN    famotidine (PEPCID) tablet 20 mg  20 mg Oral BID    senna-docusate (PERICOLACE) 8.6-50 mg per tablet 1 Tablet  1 Tablet Oral BID    polyethylene glycol (MIRALAX) packet 17 g  17 g Oral DAILY    bisacodyL (DULCOLAX) suppository 10 mg  10 mg Rectal DAILY PRN    0.9% sodium chloride infusion 250 mL  250 mL IntraVENous PRN    sodium chloride (NS) flush 5-40 mL  5-40 mL IntraVENous Q8H    sodium chloride (NS) flush 5-40 mL  5-40 mL IntraVENous PRN    acetaminophen (TYLENOL) tablet 650 mg  650 mg Oral Q6H PRN    Or    acetaminophen (TYLENOL) suppository 650 mg  650 mg Rectal Q6H PRN    polyethylene glycol (MIRALAX) packet 17 g  17 g Oral DAILY PRN    promethazine (PHENERGAN) tablet 12.5 mg  12.5 mg Oral Q6H PRN    Or    ondansetron (ZOFRAN) injection 4 mg  4 mg IntraVENous Q6H PRN    enoxaparin (LOVENOX) injection 40 mg  40 mg SubCUTAneous DAILY    albuterol (PROVENTIL VENTOLIN) nebulizer solution 2.5 mg  2.5 mg Nebulization Q4H PRN    PHENobarbitaL (LUMINAL) tablet 129.6 mg  129.6 mg Oral QHS    arformoterol 15 mcg/budesonide 0.5 mg neb solution   Nebulization BID RT        Lab Data Reviewed: (see below)  Lab Review:     Recent Labs     06/03/21  0528 06/02/21  1227 06/02/21  0530 06/01/21  0503   WBC 7.8  --  7.9 10.7   HGB 7.2* 7.9* 7.4* 8.4*   HCT 23.3* 24.8* 23.5* 26.7*     --  240 225     Recent Labs     06/03/21  0637 06/02/21  0530 06/01/21  1725 06/01/21  0503   NA  --   --   --  135*   K  --   --   --  3.8   CL  --   --   --  105   CO2  --   --   --  22   GLU  --   --   --  104*   BUN  --   --   --  13   CREA  --   --   --  0.54*   CA  --   --   --  8.3*   MG 2.1 2.2  --  2.5*   INR 1.3* 1.0 1.0  --      No results found for: GLUCPOC  No results for input(s): PH, PCO2, PO2, HCO3, FIO2 in the last 72 hours.   Recent Labs     06/03/21  0637 06/02/21  0530 06/01/21  1725   INR 1.3* 1.0 1.0     All Micro Results     Procedure Component Value Units Date/Time    COVID-19 RAPID TEST [[id number]] Collected: 05/28/21 2353    Order Status: Completed Specimen: Nasopharyngeal Updated: 05/29/21 0043     Specimen source Nasopharyngeal        COVID-19 rapid test Not detected        Comment: Rapid Abbott ID Now       Rapid NAAT:  The specimen is NEGATIVE for SARS-CoV-2, the novel coronavirus associated with COVID-19. Negative results should be treated as presumptive and, if inconsistent with clinical signs and symptoms or necessary for patient management, should be tested with an alternative molecular assay. Negative results do not preclude SARS-CoV-2 infection and should not be used as the sole basis for patient management decisions. This test has been authorized by the FDA under an Emergency Use Authorization (EUA) for use by authorized laboratories. Fact sheet for Healthcare Providers: ConventionUpdate.co.nz  Fact sheet for Patients: ConventionUpdate.co.nz       Methodology: Isothermal Nucleic Acid Amplification               I have reviewed notes of prior 24hr. Other pertinent lab: Total time spent with patient: 28 I personally reviewed chart, notes, data and current medications in the medical record. I have personally examined and treated the patient at bedside during this period.                  Care Plan discussed with: Patient, Family, Nursing Staff and >50% of time spent in counseling and coordination of care    Discussed:  Care Plan    Prophylaxis:  Lovenox, transitioning to warfarin     Disposition:  [facility] PT, OT, RN            ___________________________________________________    Attending Physician: Kali Gutiérrez DO

## 2021-06-03 NOTE — PROGRESS NOTES
Problem: Self Care Deficits Care Plan (Adult)  Goal: *Acute Goals and Plan of Care (Insert Text)  Description:   FUNCTIONAL STATUS PRIOR TO ADMISSION: Patient was independent and active without use of DME. Patient was independent for basic and instrumental ADLs. HOME SUPPORT: The patient lived with  but did not require assist.    Occupational Therapy Goals  Initiated 5/31/2021  1. Patient will perform grooming, seated, with supervision/set-up using R UE as fine motor assist within 7 day(s). 2.  Patient will perform upper body ADLs with minimal assistance/contact guard assist within 7 day(s). 3.  Patient will don/doff arm sling at minimal assistance/contact guard assist level within 7 days. 4.  Patient will perform toilet transfers to [facility] with minimal assistance/contact guard assist within 7 day(s). 5.  Patient will perform all aspects of toileting with minimal assistance within 7 day(s). 6.  Patient will utilize energy conservation techniques during functional activities with verbal cues within 7 day(s). Outcome: Progressing Towards Goal   OCCUPATIONAL THERAPY TREATMENT  Patient: Agata Galicia (57 y.o. female)  Date: 6/3/2021  Diagnosis: Humerus fracture [S42.309A] <principal problem not specified>  Procedure(s) (LRB):  RIGHT DISTAL FEMUR OPEN REDUCTION INTERNAL FIXATION (Right) 5 Days Post-Op  Precautions: Fall (NWB RUE; TTWB RLE)  Chart, occupational therapy assessment, plan of care, and goals were reviewed. ASSESSMENT  Patient continues with skilled OT services and is progressing towards goals. Pt seated in chair. She practiced transfer to w/c with removable arm rest in prep for transfers to drop arm BSC. She needs total assist to don L shoe. Pts spouse stated they have BSC at home but he is not sure if it has drop arm feature. Pts HR increase to 108 with activity. Pt and spouse educated as to importance of having sling on during functional mobility.     Current Level of Function Impacting Discharge (ADLs): Moderate assist x 2 BSC transfers, max assist LB dress    Other factors to consider for discharge:          PLAN :  Patient continues to benefit from skilled intervention to address the above impairments. Continue treatment per established plan of care to address goals. Recommend with staff: Out of bed for ADl's, there ex, there act    Recommend next OT session: Cont towards goals    Recommendation for discharge: (in order for the patient to meet his/her long term goals)  Therapy up to 5 days/week in SNF setting or an intensive home health therapy program    This discharge recommendation:  Has been made in collaboration with the attending provider and/or case management    IF patient discharges home will need the following DME: Drop arm BSC       SUBJECTIVE:   Patient stated I don't have to go    OBJECTIVE DATA SUMMARY:   Cognitive/Behavioral Status:  Neurologic State: Alert  Orientation Level: Oriented X4  Cognition: Follows commands             Functional Mobility and Transfers for ADLs:  Bed Mobility:   Not tested as pt already out of bed    Transfers:     Functional Transfers  Toilet Transfer : Moderate assistance;Assist x2  Cues: Physical assistance  Adaptive Equipment: Bedside commode (drop arm)       Balance:   Impaired standing     ADL Intervention:       Lower Body Dressing Assistance  Shoes with Cloth Laces:  Total assistance (dependent) (left shoe)         Activity Tolerance:   Fair    After treatment patient left in no apparent distress:   Sitting in chair    COMMUNICATION/COLLABORATION:   The patients plan of care was discussed with: Physical therapist and Occupational therapist.     WILFREDO Robledo  Time Calculation: 29 mins

## 2021-06-04 LAB
BASOPHILS # BLD: 0.1 K/UL (ref 0–0.1)
BASOPHILS NFR BLD: 1 % (ref 0–1)
DIFFERENTIAL METHOD BLD: ABNORMAL
EOSINOPHIL # BLD: 0.4 K/UL (ref 0–0.4)
EOSINOPHIL NFR BLD: 5 % (ref 0–7)
ERYTHROCYTE [DISTWIDTH] IN BLOOD BY AUTOMATED COUNT: 14.8 % (ref 11.5–14.5)
HCT VFR BLD AUTO: 22.3 % (ref 35–47)
HGB BLD-MCNC: 7.1 G/DL (ref 11.5–16)
IMM GRANULOCYTES # BLD AUTO: 0 K/UL
IMM GRANULOCYTES NFR BLD AUTO: 0 %
INR PPP: 1.6 (ref 0.9–1.1)
LYMPHOCYTES # BLD: 1.4 K/UL (ref 0.8–3.5)
LYMPHOCYTES NFR BLD: 17 % (ref 12–49)
MAGNESIUM SERPL-MCNC: 2.1 MG/DL (ref 1.6–2.4)
MCH RBC QN AUTO: 31 PG (ref 26–34)
MCHC RBC AUTO-ENTMCNC: 31.8 G/DL (ref 30–36.5)
MCV RBC AUTO: 97.4 FL (ref 80–99)
MONOCYTES # BLD: 0.2 K/UL (ref 0–1)
MONOCYTES NFR BLD: 2 % (ref 5–13)
MYELOCYTES NFR BLD MANUAL: 1 %
NEUTS SEG # BLD: 5.9 K/UL (ref 1.8–8)
NEUTS SEG NFR BLD: 74 % (ref 32–75)
NRBC # BLD: 0.02 K/UL (ref 0–0.01)
NRBC BLD-RTO: 0.2 PER 100 WBC
PLATELET # BLD AUTO: 271 K/UL (ref 150–400)
PMV BLD AUTO: 10.1 FL (ref 8.9–12.9)
PROTHROMBIN TIME: 16.2 SEC (ref 9–11.1)
RBC # BLD AUTO: 2.29 M/UL (ref 3.8–5.2)
RBC MORPH BLD: ABNORMAL
RBC MORPH BLD: ABNORMAL
WBC # BLD AUTO: 8 K/UL (ref 3.6–11)

## 2021-06-04 PROCEDURE — 94760 N-INVAS EAR/PLS OXIMETRY 1: CPT

## 2021-06-04 PROCEDURE — 97530 THERAPEUTIC ACTIVITIES: CPT

## 2021-06-04 PROCEDURE — 94640 AIRWAY INHALATION TREATMENT: CPT

## 2021-06-04 PROCEDURE — 85610 PROTHROMBIN TIME: CPT

## 2021-06-04 PROCEDURE — 77030038269 HC DRN EXT URIN PURWCK BARD -A

## 2021-06-04 PROCEDURE — 74011250637 HC RX REV CODE- 250/637: Performed by: ORTHOPAEDIC SURGERY

## 2021-06-04 PROCEDURE — 74011000250 HC RX REV CODE- 250: Performed by: ORTHOPAEDIC SURGERY

## 2021-06-04 PROCEDURE — 36415 COLL VENOUS BLD VENIPUNCTURE: CPT

## 2021-06-04 PROCEDURE — 83735 ASSAY OF MAGNESIUM: CPT

## 2021-06-04 PROCEDURE — 74011250637 HC RX REV CODE- 250/637: Performed by: INTERNAL MEDICINE

## 2021-06-04 PROCEDURE — 74011250636 HC RX REV CODE- 250/636: Performed by: ORTHOPAEDIC SURGERY

## 2021-06-04 PROCEDURE — 74011250637 HC RX REV CODE- 250/637: Performed by: NURSE PRACTITIONER

## 2021-06-04 PROCEDURE — 85025 COMPLETE CBC W/AUTO DIFF WBC: CPT

## 2021-06-04 PROCEDURE — 65660000000 HC RM CCU STEPDOWN

## 2021-06-04 RX ADMIN — CELECOXIB 200 MG: 100 CAPSULE ORAL at 17:33

## 2021-06-04 RX ADMIN — ACETAMINOPHEN 650 MG: 325 TABLET ORAL at 15:54

## 2021-06-04 RX ADMIN — WARFARIN SODIUM 6 MG: 5 TABLET ORAL at 17:33

## 2021-06-04 RX ADMIN — Medication 10 ML: at 21:45

## 2021-06-04 RX ADMIN — DOCUSATE SODIUM 50MG AND SENNOSIDES 8.6MG 1 TABLET: 8.6; 5 TABLET, FILM COATED ORAL at 17:33

## 2021-06-04 RX ADMIN — ACETAMINOPHEN 650 MG: 325 TABLET ORAL at 09:57

## 2021-06-04 RX ADMIN — ACETAMINOPHEN 650 MG: 325 TABLET ORAL at 21:44

## 2021-06-04 RX ADMIN — DOCUSATE SODIUM 50MG AND SENNOSIDES 8.6MG 1 TABLET: 8.6; 5 TABLET, FILM COATED ORAL at 09:57

## 2021-06-04 RX ADMIN — ARFORMOTEROL TARTRATE: 15 SOLUTION RESPIRATORY (INHALATION) at 09:56

## 2021-06-04 RX ADMIN — FAMOTIDINE 20 MG: 20 TABLET ORAL at 17:34

## 2021-06-04 RX ADMIN — ARFORMOTEROL TARTRATE: 15 SOLUTION RESPIRATORY (INHALATION) at 20:41

## 2021-06-04 RX ADMIN — Medication 10 ML: at 15:50

## 2021-06-04 RX ADMIN — PHENOBARBITAL 129.6 MG: 32.4 TABLET ORAL at 21:44

## 2021-06-04 RX ADMIN — METOPROLOL TARTRATE 75 MG: 50 TABLET, FILM COATED ORAL at 09:57

## 2021-06-04 RX ADMIN — Medication 10 ML: at 06:00

## 2021-06-04 RX ADMIN — FAMOTIDINE 20 MG: 20 TABLET ORAL at 09:57

## 2021-06-04 RX ADMIN — ACETAMINOPHEN 650 MG: 325 TABLET ORAL at 04:14

## 2021-06-04 RX ADMIN — LISINOPRIL 15 MG: 5 TABLET ORAL at 17:34

## 2021-06-04 RX ADMIN — CELECOXIB 200 MG: 100 CAPSULE ORAL at 09:57

## 2021-06-04 RX ADMIN — ENOXAPARIN SODIUM 40 MG: 40 INJECTION SUBCUTANEOUS at 09:57

## 2021-06-04 RX ADMIN — Medication 81 MG: at 09:57

## 2021-06-04 RX ADMIN — METOPROLOL TARTRATE 75 MG: 50 TABLET, FILM COATED ORAL at 17:33

## 2021-06-04 NOTE — PROGRESS NOTES
Problem: Self Care Deficits Care Plan (Adult)  Goal: *Acute Goals and Plan of Care (Insert Text)  Description:   FUNCTIONAL STATUS PRIOR TO ADMISSION: Patient was independent and active without use of DME. Patient was independent for basic and instrumental ADLs. HOME SUPPORT: The patient lived with  but did not require assist.    Occupational Therapy Goals  Initiated 5/31/2021  1. Patient will perform grooming, seated, with supervision/set-up using R UE as fine motor assist within 7 day(s). 2.  Patient will perform upper body ADLs with minimal assistance/contact guard assist within 7 day(s). 3.  Patient will don/doff arm sling at minimal assistance/contact guard assist level within 7 days. 4.  Patient will perform toilet transfers to [facility] with minimal assistance/contact guard assist within 7 day(s). 5.  Patient will perform all aspects of toileting with minimal assistance within 7 day(s). 6.  Patient will utilize energy conservation techniques during functional activities with verbal cues within 7 day(s). Outcome: Progressing Towards Goal   OCCUPATIONAL THERAPY TREATMENT  Patient: Anthony Amaro (32 y.o. female)  Date: 6/4/2021  Diagnosis: Humerus fracture [S42.309A] <principal problem not specified>  Procedure(s) (LRB):  RIGHT DISTAL FEMUR OPEN REDUCTION INTERNAL FIXATION (Right) 6 Days Post-Op  Precautions: Fall, TTWB (TTWB RLE, NWB RUE in sling when up)  Chart, occupational therapy assessment, plan of care, and goals were reviewed. ASSESSMENT  Patient continues with skilled OT services and is progressing towards goals. Pt present on [facility] stating feeling \"numb\" for sitting so long. Nursing assist for hygiene. Today she was able to perform stand pivot transfer back to chair. Min assist to don sling, cont to encourage hand, wrist and elbow exercises. Pt utilized energy conservation techniques with verbal cueing.      Current Level of Function Impacting Discharge (ADLs): Min assist BSC transfers, sling management    Other factors to consider for discharge:          PLAN :  Patient continues to benefit from skilled intervention to address the above impairments. Continue treatment per established plan of care to address goals. Recommend with staff: Out of bed for ADL's, there act, there ex    Recommend next OT session: Cont towards goals    Recommendation for discharge: (in order for the patient to meet his/her long term goals)  Occupational therapy at least 2 days/week in the home     This discharge recommendation:  Has been made in collaboration with the attending provider and/or case management    IF patient discharges home will need the following DME: gera SHAY       SUBJECTIVE:   Patient stated I get numb if I sit too long.     OBJECTIVE DATA SUMMARY:   Cognitive/Behavioral Status:  Neurologic State: Alert  Orientation Level: Oriented X4  Cognition: Follows commands             Functional Mobility and Transfers for ADLs:  Bed Mobility:  Supine to Sit: Moderate assistance  Sit to Supine:  (up to chair)  Scooting: Additional time    Transfers:  Sit to Stand: Moderate assistance; Additional time;Assist x1  Functional Transfers  Toilet Transfer : Moderate assistance;Assist x2  Cues: Physical assistance  Adaptive Equipment: Bedside commode  Bed to Chair: Moderate assistance; Additional time;Assist x2    Balance:  Sitting: Intact; Without support  Standing: Impaired; With support  Standing - Static: Constant support; Fair  Standing - Dynamic : Constant support;Poor    ADL Intervention:       Grooming  Grooming Assistance: Contact guard assistance  Brushing/Combing Hair: Contact guard assistance         Therapeutic Exercises:   Encouraged pt to engage with R UE hand, wrist and elbow exercises      Activity Tolerance:   Fair    After treatment patient left in no apparent distress:   Sitting in chair    COMMUNICATION/COLLABORATION:   The patients plan of care was discussed with: Physical therapist, Occupational therapist, and Registered nurse.      JESSE Brooks/L  Time Calculation: 28 mins

## 2021-06-04 NOTE — PROGRESS NOTES
6/4/2021  12:15 PM  RUR:  10%  Risk Level: [x]Low []Moderate []High  Value-based purchasing: [] Yes [x] No  Bundle patient: [] Yes [x] No   Specify:     Transition of care plan:  1. Awaiting medical clearance and DC order. PT/OT treating. Discharge planned for tomorrow. 2. Home with [facility] with [facility]. Revised order for SN (INR draws), PT, OT attached in [employer]. [facility] agreed to draw INR as instructed. They are aware of pt's likely DC Sat.   3. Outpatient follow-up. 4. Wheelchair transport likely required.

## 2021-06-04 NOTE — PROGRESS NOTES
Bhavin Senthil [facility] 79  [address] [facility], [address] [place] [address]  [phone]      Medical Progress Note      NAME: Analia Michaud   :  1955  MRM:  [id number]    Date of service: 2021  4:08 PM       Assessment and Plan:     Heart Palpitations/ SVT/ PAF:  tsh wnl. Echo with dilated left atrium. Started on warfarin; stop aspirin and Lovenox once warfarin therapeutic. Lopressor. Cardiology evaluated; event monitor on DC. Right femoral fracture: evaluated by orthopedics and had ORIF on . Oxycodone Prn and IV dilaudid PRN for pain and monitor for sedation. Right Humerus fracture (2021). Non operative management for now. Sling. NWB RUE. Oxycodone Prn and IV dilaudid PRN for pain and monitor for sedation. Lightheadedness: check orthostatics. Monitor Hgb. Reduced lisinopril. Anemia. Significant drop from baseline but has been stable. Likely blood loss from surgery. transfuse as needed to keep Hb<7.     Transaminitis. Resolved. ABD US showed Cholelithiasis. No evidence of acute cholecystitis. acute hepatitis panel is negative. Monitor      Seizure disorder ([facility] [address] (2021): Continue home phenobarbital.     HTN (hypertension) (2021): Reduce lisinopril due to light headedness and c/w lopressor.      HLD (hyperlipidemia) (2021): Not on home statin. Subjective:     Chief Complaint[de-identified] Patient was seen and examined as a follow up for humeral and femoral fracture. Chart was reviewed. Endorses some lightheadedness with movement; no syncope. States pain controlled. ROS:  (bold if positive, if negative)    Tolerating PT  Tolerating Diet  Lightheadedness         Objective:     Last 24hrs VS reviewed since prior progress note.  Most recent are:    Visit Vitals  /70 (BP 1 Location: Left upper arm, BP Patient Position: At rest)   Pulse 80   Temp 97.9 °F (36.6 °C)   Resp 16   Ht 5' 2.99\" (1.6 m)   Wt 90.7 kg (200 lb)   SpO2 98%   BMI 35.44 kg/m² SpO2 Readings from Last 6 Encounters:   06/04/21 98%    O2 Flow Rate (L/min): 0 l/min       Intake/Output Summary (Last 24 hours) at 6/4/2021 1608  Last data filed at 6/4/2021 9911  Gross per 24 hour   Intake 200 ml   Output 300 ml   Net -100 ml        Physical Exam:    Gen:  Well-developed, well-nourished, in no acute distress  HEENT:  Pink conjunctivae, PERRL, hearing intact to voice, moist mucous membranes  Neck:  Supple, without masses, thyroid non-tender  Resp:  No accessory muscle use, clear breath sounds without wheezes rales or rhonchi  Card:  No murmurs, normal S1, S2 without thrills, bruits or peripheral edema  Abd:  Soft, non-tender, non-distended, normoactive bowel sounds are present, no palpable organomegaly and no detectable hernias  Lymph:  No cervical or inguinal adenopathy  Musc:  No cyanosis or clubbing  Skin:  No rashes or ulcers, skin turgor is good  Neuro:  Cranial nerves are grossly intact, no focal motor weakness, follows commands appropriately  Psych:  Good insight, oriented to person, place and time, alert  __________________________________________________________________  Medications Reviewed: (see below)  Medications:     Current Facility-Administered Medications   Medication Dose Route Frequency    warfarin (COUMADIN) tablet 6 mg  6 mg Oral QPM    lisinopriL (PRINIVIL, ZESTRIL) tablet 15 mg  15 mg Oral QPM    metoprolol tartrate (LOPRESSOR) tablet 75 mg  75 mg Oral BID    aspirin delayed-release tablet 81 mg  81 mg Oral DAILY    Warfarin - Pharmacy to dose   Other Rx Dosing/Monitoring    sodium chloride (NS) flush 5-40 mL  5-40 mL IntraVENous Q8H    sodium chloride (NS) flush 5-40 mL  5-40 mL IntraVENous PRN    acetaminophen (TYLENOL) tablet 650 mg  650 mg Oral Q6H    oxyCODONE IR (ROXICODONE) tablet 5 mg  5 mg Oral Q3H PRN    oxyCODONE IR (ROXICODONE) tablet 10 mg  10 mg Oral Q3H PRN    celecoxib (CELEBREX) capsule 200 mg  200 mg Oral BID    naloxone (NARCAN) injection 0.4 mg  0.4 mg IntraVENous PRN    famotidine (PEPCID) tablet 20 mg  20 mg Oral BID    senna-docusate (PERICOLACE) 8.6-50 mg per tablet 1 Tablet  1 Tablet Oral BID    polyethylene glycol (MIRALAX) packet 17 g  17 g Oral DAILY    bisacodyL (DULCOLAX) suppository 10 mg  10 mg Rectal DAILY PRN    0.9% sodium chloride infusion 250 mL  250 mL IntraVENous PRN    sodium chloride (NS) flush 5-40 mL  5-40 mL IntraVENous Q8H    sodium chloride (NS) flush 5-40 mL  5-40 mL IntraVENous PRN    acetaminophen (TYLENOL) tablet 650 mg  650 mg Oral Q6H PRN    Or    acetaminophen (TYLENOL) suppository 650 mg  650 mg Rectal Q6H PRN    polyethylene glycol (MIRALAX) packet 17 g  17 g Oral DAILY PRN    promethazine (PHENERGAN) tablet 12.5 mg  12.5 mg Oral Q6H PRN    Or    ondansetron (ZOFRAN) injection 4 mg  4 mg IntraVENous Q6H PRN    enoxaparin (LOVENOX) injection 40 mg  40 mg SubCUTAneous DAILY    albuterol (PROVENTIL VENTOLIN) nebulizer solution 2.5 mg  2.5 mg Nebulization Q4H PRN    PHENobarbitaL (LUMINAL) tablet 129.6 mg  129.6 mg Oral QHS    arformoterol 15 mcg/budesonide 0.5 mg neb solution   Nebulization BID RT        Lab Data Reviewed: (see below)  Lab Review:     Recent Labs     06/04/21  0258 06/03/21  0528 06/02/21  1227 06/02/21  0530   WBC 8.0 7.8  --  7.9   HGB 7.1* 7.2* 7.9* 7.4*   HCT 22.3* 23.3* 24.8* 23.5*    273  --  240     Recent Labs     06/04/21  0258 06/03/21  0637 06/02/21  0530   MG 2.1 2.1 2.2   INR 1.6* 1.3* 1.0     No results found for: GLUCPOC  No results for input(s): PH, PCO2, PO2, HCO3, FIO2 in the last 72 hours.   Recent Labs     06/04/21  0258 06/03/21  0637 06/02/21  0530   INR 1.6* 1.3* 1.0     All Micro Results     Procedure Component Value Units Date/Time    COVID-19 RAPID TEST [[id number]] Collected: 05/28/21 7327    Order Status: Completed Specimen: Nasopharyngeal Updated: 05/29/21 0043     Specimen source Nasopharyngeal        COVID-19 rapid test Not detected        Comment: Rapid Abbott ID Now       Rapid NAAT:  The specimen is NEGATIVE for SARS-CoV-2, the novel coronavirus associated with COVID-19. Negative results should be treated as presumptive and, if inconsistent with clinical signs and symptoms or necessary for patient management, should be tested with an alternative molecular assay. Negative results do not preclude SARS-CoV-2 infection and should not be used as the sole basis for patient management decisions. This test has been authorized by the FDA under an Emergency Use Authorization (EUA) for use by authorized laboratories. Fact sheet for Healthcare Providers: ConventionUpdate.co.nz  Fact sheet for Patients: ConventionUpdate.co.nz       Methodology: Isothermal Nucleic Acid Amplification               I have reviewed notes of prior 24hr. Other pertinent lab: Total time spent with patient: 28 I personally reviewed chart, notes, data and current medications in the medical record. I have personally examined and treated the patient at bedside during this period.                  Care Plan discussed with: Patient, Family, Nursing Staff and >50% of time spent in counseling and coordination of care    Discussed:  Care Plan    Prophylaxis:  Lovenox, transitioning to warfarin     Disposition:   PT, OT, RN            ___________________________________________________    Attending Physician: Shady Carrasco DO

## 2021-06-04 NOTE — PROGRESS NOTES
[facility] Dosing Services: 06/04/21  Consult for Warfarin Dosing by Pharmacy by SHARIFA Villasenor NP  Consult provided for this 77 yo female for indication of new onset Atrial Fibrillation. Day of Therapy: 4  Dose to achieve an INR goal of 2-3    Order entered for Warfarin  6 (mg) ordered to be given today at 18:00. Significant drug interactions: Asa/Lovenox (dc when INR 2-3 per cards), Phenobarb  Previous dose given 6 mg   PT/INR Lab Results   Component Value Date/Time    INR 1.6 (H) 06/04/2021 02:58 AM      Platelets Lab Results   Component Value Date/Time    PLATELET 164 88/53/0583 02:58 AM      H/H Lab Results   Component Value Date/Time    HGB 7.1 (L) 06/04/2021 02:58 AM        Pharmacy to follow daily and will provide subsequent Warfarin dosing based on clinical status.   Noe Lozano)  Contact information [phone]

## 2021-06-04 NOTE — PROGRESS NOTES
Problem: Falls - Risk of  Goal: *Absence of Falls  Description: Document Elen Bateman Fall Risk and appropriate interventions in the flowsheet. Outcome: Progressing Towards Goal  Note: Fall Risk Interventions:  Mobility Interventions: Bed/chair exit alarm         Medication Interventions: Evaluate medications/consider consulting pharmacy    Elimination Interventions: Call light in reach    History of Falls Interventions: Evaluate medications/consider consulting pharmacy         Problem: Pressure Injury - Risk of  Goal: *Prevention of pressure injury  Description: Document Elbert Scale and appropriate interventions in the flowsheet.   Outcome: Progressing Towards Goal  Note: Pressure Injury Interventions:  Sensory Interventions: Assess changes in LOC    Moisture Interventions: Absorbent underpads    Activity Interventions: Increase time out of bed    Mobility Interventions: HOB 30 degrees or less    Nutrition Interventions: Document food/fluid/supplement intake    Friction and Shear Interventions: HOB 30 degrees or less

## 2021-06-04 NOTE — PROGRESS NOTES
Problem: Mobility Impaired (Adult and Pediatric)  Goal: *Acute Goals and Plan of Care (Insert Text)  Description: Problem: Mobility Impaired (Adult and Pediatric)  Goal: *Acute Goals and Plan of Care (Insert Text)  Note: FUNCTIONAL STATUS PRIOR TO ADMISSION: Patient was independent and active without use of DME.     HOME SUPPORT PRIOR TO ADMISSION: The patient lived with  but did not require assist.     Physical Therapy Goals  Initiated 5/31/2021  1. Patient will move from supine to sit and sit to supine  in bed with modified independence within 7 day(s). 2.  Patient will transfer from bed to chair and chair to bed with supervision/set-up using the least restrictive device within 7 day(s). 3.  Patient will perform sit to stand with supervision/set-up within 7 day(s). 4.  Patient will ambulate with minimal assistance/contact guard assist for 5 feet with the least restrictive device within 7 day(s). Note:   PHYSICAL THERAPY TREATMENT  Patient: Percy Gaston (1 y.o. female)  Date: 6/4/2021  Diagnosis: Humerus fracture [S42.309A] <principal problem not specified>  Procedure(s) (LRB):  RIGHT DISTAL FEMUR OPEN REDUCTION INTERNAL FIXATION (Right) 6 Days Post-Op  Precautions: Fall, TTWB (TTWB RLE, NWB RUE in sling when up)  Chart, physical therapy assessment, plan of care and goals were reviewed. ASSESSMENT  Patient continues with skilled PT services and is progressing towards goals. Full hour spent with patient working on 4 SPTs. She declined use of sliding boards. Reports too chaffing to try and scoot side to side for lateral transfer and prefers standing. Worked on SPT lead to strong left from bed>BSC, drop arm; BSC to recliner chair, leading to left; Recliner to W/C leading to R weaker side and then back to recliner chair leading to left. RUE positioned in sling, +gait belt , + shoe on left foot, and + use pia walker and arms of chairs.  Transfers overall mod-min A x 1 and Sit<>stand most challenging task with occasional min A x 2 needed or Mod X 1. Patient provided extensive session today. Plan for d/c home with  in am and does not need to await another PT tx if d/c ordered tomorrow 6/5/21. Patient and  have W/c and ramp to enter the home. If patient remains inpt then follow over weekend. Current Level of Function Impacting Discharge (mobility/balance): overall mod A x 1 for transfers only and maintains TTWB. Educated on proper set up and use of gait belt provided for home    Other factors to consider for discharge: post op anemia at 7.1 today         PLAN :  Patient continues to benefit from skilled intervention to address the above impairments. Continue treatment per established plan of care. to address goals. Recommendation for discharge: (in order for the patient to meet his/her long term goals)  Physical therapy at least 2 days/week in the home     This discharge recommendation:  Has been made in collaboration with the attending provider and/or case management    IF patient discharges home will need the following DME: bedside commode with drop arm and reacher recommended to patient       SUBJECTIVE:   Patient stated I am getting better. I don't want to use those boards.     OBJECTIVE DATA SUMMARY:   Critical Behavior:  Neurologic State: Alert  Orientation Level: Oriented X4  Cognition: Follows commands  Safety/Judgement: Insight into deficits, Good awareness of safety precautions  Functional Mobility Training:  Bed Mobility:     Supine to Sit: Moderate assistance  Sit to Supine:  (up to chair)  Scooting: Additional time        Transfers:  Sit to Stand: Moderate assistance; Additional time;Assist x1  Stand to Sit: Moderate assistance; Additional time;Assist x1  Stand Pivot Transfers: Minimum assistance; Moderate assistance; Additional time;Assist x1     Bed to Chair: Moderate assistance; Additional time;Assist x2    Balance:  Sitting: Intact; Without support  Standing: Impaired; With support  Standing - Static: Constant support; Fair  Standing - Dynamic : Constant support;Poor  Ambulation/Gait Training:  Distance (ft): 1 Feet (ft)  Assistive Device: Brace/Splint;Gait belt;Walker pia (basic sling RUE)  Ambulation - Level of Assistance: Minimal assistance; Moderate assistance; Additional time;Assist x1        Gait Abnormalities: Antalgic;Decreased step clearance  Right Side Weight Bearing: Toe touch (RUE in sling , NWB)     Base of Support: Widened     Speed/Laurence: Slow  Step Length: Left shortened;Right shortened    Pain Ratin/10    Activity Tolerance:   Poor, SpO2 stable on RA, requires frequent rest breaks, and observed SOB with activity    After treatment patient left in no apparent distress:   Sitting in chair, Call bell within reach, and Caregiver / family present    COMMUNICATION/COLLABORATION:   The patients plan of care was discussed with: Occupational therapy assistant, Registered nurse, Case management, and Certified nursing assistant/patient care technician.      Iris Patino, PT, DPT   Time Calculation: 54 mins

## 2021-06-04 NOTE — PROGRESS NOTES
Telemetry called to report what looked to be several beats of v-tach starting at 1831. Patient is nonsymptomatic. Per telemetry, vtach is not present in all leads. Paged cardiology and spoke with Dr. Davy Dobbs regarding patient. No orders at this time. Replaced patient's telemetry leads.

## 2021-06-05 VITALS
DIASTOLIC BLOOD PRESSURE: 64 MMHG | HEART RATE: 86 BPM | SYSTOLIC BLOOD PRESSURE: 132 MMHG | WEIGHT: 200 LBS | OXYGEN SATURATION: 97 % | BODY MASS INDEX: 35.44 KG/M2 | HEIGHT: 63 IN | TEMPERATURE: 97.9 F | RESPIRATION RATE: 18 BRPM

## 2021-06-05 LAB
ANION GAP SERPL CALC-SCNC: 8 MMOL/L (ref 5–15)
BASOPHILS # BLD: 0.1 K/UL (ref 0–0.1)
BASOPHILS NFR BLD: 1 % (ref 0–1)
BUN SERPL-MCNC: 14 MG/DL (ref 6–20)
BUN/CREAT SERPL: 31 (ref 12–20)
CALCIUM SERPL-MCNC: 8.1 MG/DL (ref 8.5–10.1)
CHLORIDE SERPL-SCNC: 106 MMOL/L (ref 97–108)
CO2 SERPL-SCNC: 22 MMOL/L (ref 21–32)
CREAT SERPL-MCNC: 0.45 MG/DL (ref 0.55–1.02)
DIFFERENTIAL METHOD BLD: ABNORMAL
EOSINOPHIL # BLD: 0.3 K/UL (ref 0–0.4)
EOSINOPHIL NFR BLD: 3 % (ref 0–7)
ERYTHROCYTE [DISTWIDTH] IN BLOOD BY AUTOMATED COUNT: 15.2 % (ref 11.5–14.5)
GLUCOSE SERPL-MCNC: 96 MG/DL (ref 65–100)
HCT VFR BLD AUTO: 22.5 % (ref 35–47)
HGB BLD-MCNC: 7.2 G/DL (ref 11.5–16)
IMM GRANULOCYTES # BLD AUTO: 0.2 K/UL (ref 0–0.04)
IMM GRANULOCYTES NFR BLD AUTO: 2 % (ref 0–0.5)
INR PPP: 1.7 (ref 0.9–1.1)
LYMPHOCYTES # BLD: 1.5 K/UL (ref 0.8–3.5)
LYMPHOCYTES NFR BLD: 19 % (ref 12–49)
MAGNESIUM SERPL-MCNC: 2.2 MG/DL (ref 1.6–2.4)
MCH RBC QN AUTO: 31.4 PG (ref 26–34)
MCHC RBC AUTO-ENTMCNC: 32 G/DL (ref 30–36.5)
MCV RBC AUTO: 98.3 FL (ref 80–99)
MONOCYTES # BLD: 0.5 K/UL (ref 0–1)
MONOCYTES NFR BLD: 6 % (ref 5–13)
NEUTS SEG # BLD: 5.7 K/UL (ref 1.8–8)
NEUTS SEG NFR BLD: 69 % (ref 32–75)
NRBC # BLD: 0 K/UL (ref 0–0.01)
NRBC BLD-RTO: 0 PER 100 WBC
PLATELET # BLD AUTO: 305 K/UL (ref 150–400)
PMV BLD AUTO: 10.2 FL (ref 8.9–12.9)
POTASSIUM SERPL-SCNC: 3.6 MMOL/L (ref 3.5–5.1)
PROTHROMBIN TIME: 17.4 SEC (ref 9–11.1)
RBC # BLD AUTO: 2.29 M/UL (ref 3.8–5.2)
SODIUM SERPL-SCNC: 136 MMOL/L (ref 136–145)
WBC # BLD AUTO: 8.2 K/UL (ref 3.6–11)

## 2021-06-05 PROCEDURE — 74011000250 HC RX REV CODE- 250: Performed by: ORTHOPAEDIC SURGERY

## 2021-06-05 PROCEDURE — 97110 THERAPEUTIC EXERCISES: CPT

## 2021-06-05 PROCEDURE — 94760 N-INVAS EAR/PLS OXIMETRY 1: CPT

## 2021-06-05 PROCEDURE — 74011250637 HC RX REV CODE- 250/637: Performed by: INTERNAL MEDICINE

## 2021-06-05 PROCEDURE — 94640 AIRWAY INHALATION TREATMENT: CPT

## 2021-06-05 PROCEDURE — 36415 COLL VENOUS BLD VENIPUNCTURE: CPT

## 2021-06-05 PROCEDURE — 74011250636 HC RX REV CODE- 250/636: Performed by: ORTHOPAEDIC SURGERY

## 2021-06-05 PROCEDURE — 85610 PROTHROMBIN TIME: CPT

## 2021-06-05 PROCEDURE — 74011250637 HC RX REV CODE- 250/637: Performed by: NURSE PRACTITIONER

## 2021-06-05 PROCEDURE — 74011250637 HC RX REV CODE- 250/637: Performed by: ORTHOPAEDIC SURGERY

## 2021-06-05 PROCEDURE — 85025 COMPLETE CBC W/AUTO DIFF WBC: CPT

## 2021-06-05 PROCEDURE — 80048 BASIC METABOLIC PNL TOTAL CA: CPT

## 2021-06-05 PROCEDURE — 83735 ASSAY OF MAGNESIUM: CPT

## 2021-06-05 RX ORDER — WARFARIN 6 MG/1
6 TABLET ORAL EVERY EVENING
Qty: 30 TABLET | Refills: 0 | Status: SHIPPED | OUTPATIENT
Start: 2021-06-05 | End: 2021-11-15

## 2021-06-05 RX ORDER — NALOXONE HYDROCHLORIDE 4 MG/.1ML
SPRAY NASAL
Qty: 2 EACH | Refills: 0 | Status: SHIPPED | OUTPATIENT
Start: 2021-06-05 | End: 2021-11-15

## 2021-06-05 RX ORDER — METOPROLOL TARTRATE 50 MG/1
100 TABLET ORAL 2 TIMES DAILY
Status: DISCONTINUED | OUTPATIENT
Start: 2021-06-05 | End: 2021-06-05 | Stop reason: HOSPADM

## 2021-06-05 RX ORDER — POTASSIUM CHLORIDE 750 MG/1
40 TABLET, FILM COATED, EXTENDED RELEASE ORAL
Status: COMPLETED | OUTPATIENT
Start: 2021-06-05 | End: 2021-06-05

## 2021-06-05 RX ORDER — POLYETHYLENE GLYCOL 3350 17 G/17G
17 POWDER, FOR SOLUTION ORAL
Qty: 30 PACKET | Refills: 0 | Status: SHIPPED | OUTPATIENT
Start: 2021-06-05 | End: 2021-11-15

## 2021-06-05 RX ORDER — FAMOTIDINE 20 MG/1
20 TABLET, FILM COATED ORAL DAILY
Qty: 30 TABLET | Refills: 0 | Status: SHIPPED | OUTPATIENT
Start: 2021-06-05 | End: 2021-11-15

## 2021-06-05 RX ORDER — ENOXAPARIN SODIUM 100 MG/ML
40 INJECTION SUBCUTANEOUS DAILY
Qty: 4 SYRINGE | Refills: 0 | Status: SHIPPED | OUTPATIENT
Start: 2021-06-06 | End: 2021-06-09

## 2021-06-05 RX ORDER — LISINOPRIL 5 MG/1
15 TABLET ORAL EVERY EVENING
Qty: 90 TABLET | Refills: 0 | Status: SHIPPED | OUTPATIENT
Start: 2021-06-05 | End: 2021-11-15

## 2021-06-05 RX ORDER — OXYCODONE HYDROCHLORIDE 5 MG/1
5 TABLET ORAL
Qty: 12 TABLET | Refills: 0 | Status: SHIPPED | OUTPATIENT
Start: 2021-06-05 | End: 2021-06-08

## 2021-06-05 RX ORDER — AMOXICILLIN 250 MG
1 CAPSULE ORAL
Qty: 60 TABLET | Refills: 0 | Status: SHIPPED | OUTPATIENT
Start: 2021-06-05 | End: 2021-11-15

## 2021-06-05 RX ORDER — ASPIRIN 81 MG/1
81 TABLET ORAL DAILY
Qty: 30 TABLET | Refills: 0 | Status: SHIPPED | OUTPATIENT
Start: 2021-06-06 | End: 2021-11-08

## 2021-06-05 RX ORDER — METOPROLOL TARTRATE 100 MG/1
100 TABLET ORAL 2 TIMES DAILY
Qty: 60 TABLET | Refills: 0 | Status: SHIPPED | OUTPATIENT
Start: 2021-06-05

## 2021-06-05 RX ADMIN — METOPROLOL TARTRATE 75 MG: 50 TABLET, FILM COATED ORAL at 09:34

## 2021-06-05 RX ADMIN — FAMOTIDINE 20 MG: 20 TABLET ORAL at 09:34

## 2021-06-05 RX ADMIN — ARFORMOTEROL TARTRATE: 15 SOLUTION RESPIRATORY (INHALATION) at 08:17

## 2021-06-05 RX ADMIN — Medication 10 ML: at 06:12

## 2021-06-05 RX ADMIN — CELECOXIB 200 MG: 100 CAPSULE ORAL at 09:34

## 2021-06-05 RX ADMIN — POTASSIUM CHLORIDE 40 MEQ: 750 TABLET, FILM COATED, EXTENDED RELEASE ORAL at 14:43

## 2021-06-05 RX ADMIN — Medication 81 MG: at 09:34

## 2021-06-05 RX ADMIN — ACETAMINOPHEN 650 MG: 325 TABLET ORAL at 14:44

## 2021-06-05 RX ADMIN — ACETAMINOPHEN 650 MG: 325 TABLET ORAL at 03:26

## 2021-06-05 RX ADMIN — ENOXAPARIN SODIUM 40 MG: 40 INJECTION SUBCUTANEOUS at 09:34

## 2021-06-05 RX ADMIN — ACETAMINOPHEN 650 MG: 325 TABLET ORAL at 09:34

## 2021-06-05 NOTE — PROGRESS NOTES
Spoke with Dr Edgar Mixon in regards to patient needing holter monitor at discharge. She spoke with Dr Kaya Vasquez, office will contact patient on Monday unable to do over the weekend.

## 2021-06-05 NOTE — DISCHARGE INSTRUCTIONS
HOSPITALIST DISCHARGE INSTRUCTIONS  NAME: Krishna Oneal   :  1955   MRN:  [id number]     Date/Time:  2021 1:37 PM    ADMIT DATE: 2021     DISCHARGE DATE: 2021     ADMITTING DIAGNOSIS:  Hip Fracture  Arm Fracture     DISCHARGE DIAGNOSIS:  Hip Fracture   Arm Fracture   Heart Arrhythmia     PLEASE STOP TAKING LOVENOX AND ASPIRIN ONCE YOUR INR REACHES 2 OR ABOVE ON WARFARIN. TRAUMATIC FEMUR DISCHARGE INSTRUCTIONS      Patient Anita Lyons   Date of procedure:2021   Procedure:Procedure(s):  RIGHT DISTAL FEMUR OPEN REDUCTION INTERNAL FIXATION   Surgeon:Surgeon(s) and Role:     Qiana Mccormick MD - Primary   PCP: Lorenzo Pickens MD   Date of discharge: No discharge date for patient encounter. For nursing staff, please review the special instructions below. If you have any questions you may contact my office at [phone]. Follow up appointment:  Please call our office at [phone] for your follow up appointment. This should be scheduled 14 days following the date of surgery. Physical Therapy / Nursing:  Physical Therapy following surgery will be arranged at your care facility or though Home Health. You may TOE [facility] WEIGHT BEAR on your operative leg while using a pia-walker in your left arm. Please limit right knee motion 0-45. Please do not bear weight on your right arm. We recommend use of a sling for the right arm at least when out of bed. You may work on right elbow, wrist, and digit motion as tolerated. Wound Care/ Dressing Changes:  Surgical dressings may be removed 7 days from surgery. Then cover your incisions with a porous dressing such as a Primapore. This can be changed every 7 days or more frequently based on drainage. Showering/ Bathing:  Please keep your dressings/incisions dry until cleared by your surgeon(s). Diet:  You may advance to your regular diet as tolerated. Medication:  1.  You should take over-the-counter Tylenol 650 mg every 6 hours for pain control for up to 14 days. Then reduce Tylenol to 500 mg every 6 hours thereafter. You should avoid over-the-counter anti-inflammatories (Advil, Aleve, ibuprofen, Motrin, naprosyn) while taking blood thinner medicines such as coumadin. 2. Please take narcotic medicine as prescribed, as needed for pain uncontrolled with Tylenol. 3. Refills of pain medication are authorized during office hours only (8 AM- 5 PM  Monday thru Friday). Many of these medication will require you or a family member to pick-up a physical prescription at the office. 4. Continue the blood thinner (Coumadin) for a minumum of 30 days following hip surgery OR LONGER AS RECOMMENDED BY CARDIOLOGY. 5. If you have constipation which is not improved by oral stool softeners then a Ducolax suppository should be purchased over the counter. DRIVING  You should not return to driving until you are off all opioid pain medications and able to safely and quickly apply the brakes. Please discuss further with your surgeon at the office appointment. Important Signs and Symptoms:  If any of the following signs or symptoms occur, you should contact your surgeons office. Please be advised if a problem arises which you feel requires immediate medical attention, you should seek immediate medical attention at the ER or other health care facility you have access to.    1. A sudden increase in swelling and/or redness or warmth at the area your surgery was performed which isnt relieved by rest, ice, and elevation. 2. Oral temperature greater than 101 degrees for 12 hours or more which isnt relieved by an increase in fluid intake and over-the-counter medicines. 3. Excessive drainage from your incisions, or drainage which hasnt stopped by 72 hours after your surgery.   4. Fever, chills, shortness of breath, chest pain, nausea, vomiting or other signs and symptoms which are of concern to you.        Toshia Barriga MD  [facility]  Office [phone]    Patient Education        Supraventricular Tachycardia: Care Instructions  Overview     Having supraventricular tachycardia (SVT) means that sometimes your heart beats abnormally fast. This fast rhythm is caused by changes in the electrical system of your heart. You may feel a fluttering in your chest (palpitations) and have a fast pulse. When your heart is beating fast, you may feel anxious and lightheaded, be short of breath, and feel discomfort in the chest.  Your doctor may prescribe medicines to help slow down your heartbeat. Your doctor may also suggest you try vagal maneuvers to help slow your heart rate. Your doctor can show you how to do them. In some cases, either cardioversion treatment or a procedure called catheter ablation is done to correct SVT. Your doctor may ask you to wear a small electronic device for 1 or 2 days to monitor your heart. It is called a Holter monitor. Follow-up care is a key part of your treatment and safety. Be sure to make and go to all appointments, and call your doctor if you are having problems. It's also a good idea to know your test results and keep a list of the medicines you take. How can you care for yourself at home? · Be safe with medicines. Take your medicines exactly as prescribed. Call your doctor if you think you are having a problem with your medicine. You will get more details on the specific medicines your doctor prescribes. · If your doctor showed you how to do vagal maneuvers, try them when you have an episode. These maneuvers include bearing down or putting an ice-cold, wet towel on your face. · Monitor your condition by keeping a diary of your SVT episodes. Bring this to your doctor appointments. ? Write down how fast or slow your heart was beating. To count your heart rate:  § Gently place 2 fingers of your hand on the inside of your other wrist, below your thumb.   § Count the beats for 30 seconds. § Then, double the result to get the number of beats per minute. ? Write down if your heart rhythm was regular or irregular. ? Write down the symptoms you had.  ? Write down the time of day your symptoms occurred. ? Write down how long your symptoms lasted. ? Write down what you were doing when your symptoms started. ? Write down what may have helped your symptoms go away. · If they trigger episodes, limit or avoid alcohol or drinks with caffeine. · Do not use over-the-counter decongestants, herbal remedies, diet pills, or \"pep\" pills, which often contain stimulants. · Do not use illegal drugs, such as cocaine, ecstasy, or methamphetamine, which can speed up your heart's rhythm. · Do not smoke. Smoking can make this condition worse. If you need help quitting, talk to your doctor about stop-smoking programs and medicines. These can increase your chances of quitting for good. · Be alert for new or worsening symptoms, such as shortness of breath, pounding of your heart, or unusual tiredness. If new symptoms develop or your symptoms become worse, call your doctor. When should you call for help? Call 911 anytime you think you may need emergency care. For example, call if:    · You passed out (lost consciousness).     · You are short of breath. Call your doctor now or seek immediate medical care if:    · You have a fast heartbeat.     · You are dizzy or lightheaded, or feel like you may faint. Watch closely for changes in your health, and be sure to contact your doctor if:    · You do not get better as expected. Where can you learn more? Go to http://babar-shima.info/  Enter G244 in the search box to learn more about \"Supraventricular Tachycardia: Care Instructions. \"  Current as of: August 31, 2020               Content Version: 12.8  © 8009-4691 [employer], [employer].    Care instructions adapted under license by [employer] (which disclaims liability or warranty for this information). If you have questions about a medical condition or this instruction, always ask your healthcare professional. [place] [address] any warranty or liability for your use of this information. MEDICATIONS:     · It is important that you take the medication exactly as they are prescribed. · Keep your medication in the bottles provided by the pharmacist and keep a list of the medication names, dosages, and times to be taken in your wallet. · Do not take other medications without consulting your doctor     Pain Management: per above medications    What to do at Home    Recommended diet:  Cardiac Diet    Recommended activity: Activity as tolerated    1) Return to the hospital if you feel worse    2) If you experience any of the following symptoms then please call your primary care physician or return to the emergency room if you cannot get hold of your doctor:  Fever, chills, nausea, vomiting, diarrhea, change in mentation, falling, bleeding, shortness of breath, chest pain, severe headache, severe abdominal pain. Follow Up: Follow-up Information     Follow up With Specialties Details Why Contact Info    [facility]   [facility] provider [address] [place] [address]  [phone]    [facility], If you have not received a phone call to schedule, call directly within 24 hours of discharge.  [address] [facility]    Anitra White MD Cardiology On 7/13/2021 1020 am [address] [facility]  [address] [place] [address]  [phone]      [facility] Visit        Gio Morales MD Family Medicine In 2 days Hospital Follow Up and warfarin mangement  [address] [facility]  [phone]      Jose Gaming MD Orthopedic Surgery In 10 days Surgery follow Up  [address]  [address] [facility] [address]  [phone]         PLEASE STOP TAKING LOVENOX AND ASPIRIN ONCE YOUR INR REACHES 2 OR ABOVE ON WARFARIN. Anitra White MD Cardiology  Our cardiology office will call you to set you up for a cardiac monitor. Please call them if you do not hear from them on 06/07/21. [address] [facility]  [phone]              Information obtained by :  I understand that if any problems occur once I am at home I am to contact my physician. I understand and acknowledge receipt of the instructions indicated above.                                                                                                                                            Physician's or R.N.'s Signature                                                                  Date/Time                                                                                                                                              Patient or Representative Signature                                                          Date/Time

## 2021-06-05 NOTE — DISCHARGE SUMMARY
Bhavin Ndiaye [facility]  [address] [facility], [address] [place] [address]  [phone]    Physician Discharge Summary     Patient ID:  Joyceann Oppenheim  [id number]  76 y.o.  1955    Admit date: 5/28/2021    Discharge date and time: 6/5/2021 2:25 PM    Admission Diagnoses: Humerus fracture [S42.309A]    Discharge Diagnoses:  Principal Diagnosis <principal problem not specified>                                            Active Problems:    Humerus fracture (5/28/2021)      HTN (hypertension) (5/28/2021)      HLD (hyperlipidemia) (5/28/2021)      Seizure disorder ([address] (5/28/2021)      Elevated LFTs (5/28/2021)           Hospital Course:     Heart Palpitations/ SVT/ PAF:  tsh wnl. Echo with dilated left atrium. Started on warfarin; stop aspirin and Lovenox once warfarin therapeutic. Noted v tach on 6/04 evening; discussed with cardiology, agreed with lopressor increae. Cardiology evaluated; event monitor on DC.      Right femoral fracture: evaluated by orthopedics and had ORIF on 5/29. Oxycodone Prn for pain. F/u orthopedics OP.      Right Humerus fracture (5/28/2021). Non operative management for now. Sling. NWB RUE. Oxycodone Prn for pain and monitor for sedation. F/u orthopedics OP.      Lightheadedness: orthostatics neg. Follow up with PCP for anemia and cardiology as dicussed above.      Anemia. Significant drop from baseline but has been stable. Likely blood loss from surgery. transfuse as needed to keep Hb<7.      Transaminitis. Resolved. ABD US showed Cholelithiasis. No evidence of acute cholecystitis. acute hepatitis panel is negative.  Monitor      Seizure disorder ([address] (5/28/2021): Continue home phenobarbital.     HTN (hypertension) (5/28/2021): Reduce lisinopril due lopressor and c/wlisinopril at lower dose.      HLD (hyperlipidemia) (5/28/2021): Not on home statin.       PCP: Raquel Liz MD     Consults: Cardiology and Orthopedic Surgery    Significant Diagnostic Studies:     Echo · LV: Estimated LVEF is 55 - 60%. Normal cavity size, wall thickness and systolic function (ejection fraction normal). Wall motion: normal.  · LA: Mildly dilated left atrium. · AV: Probably trileaflet aortic valve. · MV: Mild mitral valve regurgitation is present. Discharge Exam:  Physical Exam:    Gen:  Well-developed, well-nourished, in no acute distress  HEENT:  Pink conjunctivae, PERRL, hearing intact to voice, moist mucous membranes  Neck:  Supple, without masses, thyroid non-tender  Resp:  No accessory muscle use, clear breath sounds without wheezes rales or rhonchi  Card:  No murmurs, normal S1, S2 without thrills, bruits or peripheral edema  Abd:  Soft, non-tender, non-distended, normoactive bowel sounds are present, no palpable organomegaly and no detectable hernias  Lymph:  No cervical or inguinal adenopathy  Musc:  No cyanosis or clubbing  Skin:  No rashes or ulcers, skin turgor is good  Neuro:  Cranial nerves are grossly intact, no focal motor weakness, follows commands appropriately  Psych:  Good insight, oriented to person, place and time, alert    Disposition: home  Discharge Condition: Stable    Patient Instructions:   Current Discharge Medication List      START taking these medications    Details   warfarin (COUMADIN) 6 mg tablet Take 1 Tablet by mouth every evening. Qty: 30 Tablet, Refills: 0      senna-docusate (PERICOLACE) 8.6-50 mg per tablet Take 1 Tablet by mouth two (2) times daily as needed for Constipation. Qty: 60 Tablet, Refills: 0      polyethylene glycol (MIRALAX) 17 gram packet Take 1 Packet by mouth daily as needed for Constipation. Qty: 30 Packet, Refills: 0      oxyCODONE IR (ROXICODONE) 5 mg immediate release tablet Take 1 Tablet by mouth every six (6) hours as needed for Pain for up to 3 days.  Max Daily Amount: 20 mg.  Qty: 12 Tablet, Refills: 0    Associated Diagnoses: Closed fracture of distal end of femur, unspecified fracture morphology, sequela; Closed fracture of proximal end of right humerus, unspecified fracture morphology, initial encounter      naloxone (Narcan) 4 mg/actuation nasal spray Use 1 spray intranasally, then discard. Repeat with new spray every 2 min as needed for opioid overdose symptoms, alternating nostrils. Qty: 2 Each, Refills: 0      metoprolol tartrate (LOPRESSOR) 100 mg IR tablet Take 1 Tablet by mouth two (2) times a day. Qty: 60 Tablet, Refills: 0      lisinopriL (PRINIVIL, ZESTRIL) 5 mg tablet Take 3 Tablets by mouth every evening. Qty: 90 Tablet, Refills: 0      famotidine (PEPCID) 20 mg tablet Take 1 Tablet by mouth daily. Qty: 30 Tablet, Refills: 0      enoxaparin (LOVENOX) 40 mg/0.4 mL 0.4 mL by SubCUTAneous route daily for 3 days. PLEASE STOP TAKING LOVENOX ONCE YOUR INR REACHES 2 OR ABOVE ON WARFARIN. Qty: 4 Syringe, Refills: 0      aspirin delayed-release 81 mg tablet Take 1 Tablet by mouth daily. Qty: 30 Tablet, Refills: 0         CONTINUE these medications which have NOT CHANGED    Details   albuterol (PROVENTIL HFA, VENTOLIN HFA, PROAIR HFA) 90 mcg/actuation inhaler Take 2 Puffs by inhalation every four (4) hours as needed for Wheezing. fluticasone propion-salmeteroL (Wixela Inhub) 250-50 mcg/dose diskus inhaler Take 1 Puff by inhalation every twelve (12) hours. PHENobarbitaL (LUMINAL) 32.4 mg tablet Take 129.6 mg by mouth nightly. 129.6 mg = 4 tablets      acetaminophen (Tylenol Arthritis Pain) 650 mg TbER Take 1,300 mg by mouth two (2) times a day.  Takes with diclofenac         STOP taking these medications       amLODIPine (NORVASC) 5 mg tablet Comments:   Reason for Stopping:         diclofenac EC (VOLTAREN) 75 mg EC tablet Comments:   Reason for Stopping:         lisinopril-hydroCHLOROthiazide (PRINZIDE, ZESTORETIC) 20-12.5 mg per tablet Comments:   Reason for Stopping:             Activity: Activity as tolerated  Diet: Cardiac Diet  Wound Care: None needed    Follow-up with  Follow-up Information     Follow up With Specialties Details Why Contact Info    [facility]   [facility] provider [address] [facility]  [place] [address] [address] [phone]    [address], If you have not received a phone call to schedule, call directly within 24 hours of discharge. [address] [facility] [address]    Jeffrey Quevedo MD Cardiology On 7/13/2021 1020 am [address]  [facility]  [phone]      [place] [facility] Visit        Derrek Yates MD Family Medicine In 2 days Hospital Follow Up and warfarin mangement  [address]  [address] [facility]  [phone]      Corrie Zamudio MD Orthopedic Surgery In 10 days Surgery follow Up  [address] [phone]  [address] [place] INR REACHES 2 OR ABOVE ON WARFARIN. Jeffrey Quevedo MD Cardiology  Our cardiology office will call you to set you up for a cardiac monitor. Please call them if you do not hear from them on 06/07/21. [address] [facility]  [phone]            Follow-up tests/labs as above.      Signed:  Josephine Flanagan DO  6/5/2021  2:25 PM  **I personally spent 35 min on discharge**

## 2021-06-05 NOTE — PROGRESS NOTES
Problem: Mobility Impaired (Adult and Pediatric)  Goal: *Acute Goals and Plan of Care (Insert Text)  Description: Problem: Mobility Impaired (Adult and Pediatric)  Goal: *Acute Goals and Plan of Care (Insert Text)  Note: FUNCTIONAL STATUS PRIOR TO ADMISSION: Patient was independent and active without use of DME.     HOME SUPPORT PRIOR TO ADMISSION: The patient lived with  but did not require assist.     Physical Therapy Goals  Initiated 5/31/2021  1. Patient will move from supine to sit and sit to supine  in bed with modified independence within 7 day(s). 2.  Patient will transfer from bed to chair and chair to bed with supervision/set-up using the least restrictive device within 7 day(s). 3.  Patient will perform sit to stand with supervision/set-up within 7 day(s). 4.  Patient will ambulate with minimal assistance/contact guard assist for 5 feet with the least restrictive device within 7 day(s). Note:   PHYSICAL THERAPY TREATMENT  Patient: Hamida Leonardo (4 y.o. female)  Date: 6/5/2021  Diagnosis: Humerus fracture [S42.309A] <principal problem not specified>  Procedure(s) (LRB):  RIGHT DISTAL FEMUR OPEN REDUCTION INTERNAL FIXATION (Right) 7 Days Post-Op  Precautions: Fall, TTWB (TTWB RLE, NWB RUE in sling when up)  Chart, physical therapy assessment, plan of care and goals were reviewed. ASSESSMENT  Patient continues with skilled PT services. Pt reported just recently back to bed. Pt agreeable to and performed AROM and AAROM to LE with min assist and cues. Gentle active assistive hip abd/add performed on right LE. Pt instructed to perform heel slides and hip abd/add on left LE. Pt performing ankle pumps throughout the day. PT will continue to follow. PLAN :  Patient continues to benefit from skilled intervention to address the above impairments. Continue treatment per established plan of care. to address goals.     Recommendation for discharge: (in order for the patient to meet his/her long term goals)  To be determined: This discharge recommendation:  Has been made in collaboration with the attending provider and/or case management    IF patient discharges home will need the following DME: wheelchair       SUBJECTIVE:       OBJECTIVE DATA SUMMARY:   Critical Behavior:  Neurologic State: Alert  Orientation Level: Oriented X4  Cognition: Appropriate decision making, Appropriate safety awareness, Appropriate for age attention/concentration, Follows commands  Safety/Judgement: Insight into deficits, Good awareness of safety precautions      Therapeutic Exercises:   LLE AROM and AAROM with cues.   Activity Tolerance:   Good and Fair    After treatment patient left in no apparent distress:   Supine in bed    COMMUNICATION/COLLABORATION:   The patients plan of care was discussed with: Physical therapist.     Zabrina Maravilla PTA   Time Calculation: 11 mins

## 2021-06-05 NOTE — PROGRESS NOTES
[facility] Dosing Services: 06/05/21  Consult for Warfarin Dosing by Pharmacy by SHARIFA Grimm NP  Consult provided for this 77 yo female for indication of new onset Atrial Fibrillation. Day of Therapy: 5  Dose to achieve an INR goal of 2-3    Order entered for Warfarin  6 (mg) ordered to be given today at 18:00. Significant drug interactions: Asa/Lovenox (dc when INR 2-3 per cards), Phenobarb  Previous dose given 6 mg   PT/INR Lab Results   Component Value Date/Time    INR 1.7 (H) 06/05/2021 10:12 AM      Platelets Lab Results   Component Value Date/Time    PLATELET 020 08/23/8604 03:02 AM      H/H Lab Results   Component Value Date/Time    HGB 7.2 (L) 06/05/2021 03:02 AM        Pharmacy to follow daily and will provide subsequent Warfarin dosing based on clinical status.   Piero Purple)  Contact information [phone]

## 2021-06-05 NOTE — PROGRESS NOTES
Transition of care plan - RUR 11% (low risk):  1. Discharge home with [facility] provided by [facility]. [facility] agreed to draw INR as instructed. They are aware of pt's discharge. 2. Outpatient follow-up. 3. Transport at discharge- wheelchair [employer] transport, estimated arrival is 4-4:30pm     Care Management Interventions  Mode of Transport at Discharge:  Other (see comment)  Current Support Network: Lives with Spouse  Confirm Follow Up Transport: Family  The Plan for Transition of Care is Related to the Following Treatment Goals : rt distal femur fracture and humerous fracture  Discharge Location  Discharge Placement: Home with home health     Reed Edwards LCSW

## 2021-06-05 NOTE — PROGRESS NOTES
Report received pt sitting in chair assisted back to bed     2144  Pt given med     0114  Pt resting quietly call bell within reach     0326  Pt given tylenol without difficulty voiced no needs     0635  Received call from RRT nurse she was unable to draw blood after several attempts     0721  Bedside and Verbal shift change report given to 1200 Jeremy Brown (oncoming nurse) by Sudha Mullen (offgoing nurse).  Report included the following information SBAR, Kardex, ED Summary, Procedure Summary, Intake/Output, MAR and Cardiac Rhythm SR.

## 2021-06-08 NOTE — PROGRESS NOTES
Cardiac monitor - patient discharged before monitor could be applied. Lotus Donovan will order [employer] monitor per patient preference.

## 2021-07-12 ENCOUNTER — TRANSCRIBE ORDER (OUTPATIENT)
Dept: SCHEDULING | Age: 66
End: 2021-07-12

## 2021-07-12 DIAGNOSIS — S42.294G: Primary | ICD-10-CM

## 2021-08-12 ENCOUNTER — HOSPITAL ENCOUNTER (OUTPATIENT)
Dept: CT IMAGING | Age: 66
Discharge: HOME | End: 2021-08-12
Payer: MEDICARE

## 2021-08-12 ENCOUNTER — DOCUMENTATION ONLY (OUTPATIENT)
Dept: CARDIOLOGY CLINIC | Age: 66
End: 2021-08-12

## 2021-08-12 DIAGNOSIS — S42.294G: ICD-10-CM

## 2021-08-12 PROCEDURE — 73200 CT UPPER EXTREMITY W/O DYE: CPT

## 2021-08-12 NOTE — PROGRESS NOTES
Event monitor 6/21/2021-7/20/2021  Minimum heart rate 53 bpm, maximum heart rate 1 0 bpm, no A. fib/no SVT/no pauses  1 episode of NSVT 4 beat run. Follow-up appointment rescheduled/canceled per patient. Ashvin Sutton MD, [facility]

## 2021-10-01 ENCOUNTER — TRANSCRIBE ORDER (OUTPATIENT)
Dept: SCHEDULING | Age: 66
End: 2021-10-01

## 2021-10-01 DIAGNOSIS — Z13.820 SCREENING FOR OSTEOPOROSIS: Primary | ICD-10-CM

## 2021-10-14 ENCOUNTER — TRANSCRIBE ORDER (OUTPATIENT)
Dept: SCHEDULING | Age: 66
End: 2021-10-14

## 2021-10-15 ENCOUNTER — TRANSCRIBE ORDER (OUTPATIENT)
Dept: SCHEDULING | Age: 66
End: 2021-10-15

## 2021-10-15 DIAGNOSIS — M81.0 OSTEOPOROSIS WITHOUT PATHOLOGICAL FRACTURE: Primary | ICD-10-CM

## 2021-11-15 ENCOUNTER — HOSPITAL ENCOUNTER (OUTPATIENT)
Dept: PREADMISSION TESTING | Age: 66
Discharge: HOME | End: 2021-11-15
Payer: MEDICARE

## 2021-11-15 VITALS
HEIGHT: 64 IN | RESPIRATION RATE: 16 BRPM | OXYGEN SATURATION: 96 % | DIASTOLIC BLOOD PRESSURE: 77 MMHG | TEMPERATURE: 96 F | HEART RATE: 74 BPM | BODY MASS INDEX: 30.73 KG/M2 | SYSTOLIC BLOOD PRESSURE: 177 MMHG | WEIGHT: 180 LBS

## 2021-11-15 LAB
ABO + RH BLD: NORMAL
ALBUMIN SERPL-MCNC: 4.2 G/DL (ref 3.5–5)
ALBUMIN/GLOB SERPL: 1 {RATIO} (ref 1.1–2.2)
ALP SERPL-CCNC: 169 U/L (ref 45–117)
ALT SERPL-CCNC: 22 U/L (ref 12–78)
ANION GAP SERPL CALC-SCNC: 6 MMOL/L (ref 5–15)
APPEARANCE UR: CLEAR
AST SERPL-CCNC: 20 U/L (ref 15–37)
BACTERIA URNS QL MICRO: NEGATIVE /HPF
BASOPHILS # BLD: 0.1 K/UL (ref 0–0.1)
BASOPHILS NFR BLD: 1 % (ref 0–1)
BILIRUB SERPL-MCNC: 0.3 MG/DL (ref 0.2–1)
BILIRUB UR QL: NEGATIVE
BLOOD GROUP ANTIBODIES SERPL: NORMAL
BUN SERPL-MCNC: 24 MG/DL (ref 6–20)
BUN/CREAT SERPL: 27 (ref 12–20)
CALCIUM SERPL-MCNC: 10.2 MG/DL (ref 8.5–10.1)
CHLORIDE SERPL-SCNC: 105 MMOL/L (ref 97–108)
CO2 SERPL-SCNC: 29 MMOL/L (ref 21–32)
COLOR UR: ABNORMAL
CREAT SERPL-MCNC: 0.9 MG/DL (ref 0.55–1.02)
CRP SERPL-MCNC: 1.05 MG/DL (ref 0–0.6)
DIFFERENTIAL METHOD BLD: NORMAL
EOSINOPHIL # BLD: 0.1 K/UL (ref 0–0.4)
EOSINOPHIL NFR BLD: 1 % (ref 0–7)
EPITH CASTS URNS QL MICRO: ABNORMAL /LPF
ERYTHROCYTE [DISTWIDTH] IN BLOOD BY AUTOMATED COUNT: 13.5 % (ref 11.5–14.5)
ERYTHROCYTE [SEDIMENTATION RATE] IN BLOOD: 36 MM/HR (ref 0–30)
EST. AVERAGE GLUCOSE BLD GHB EST-MCNC: 105 MG/DL
GLOBULIN SER CALC-MCNC: 4.2 G/DL (ref 2–4)
GLUCOSE SERPL-MCNC: 97 MG/DL (ref 65–100)
GLUCOSE UR STRIP.AUTO-MCNC: NEGATIVE MG/DL
HBA1C MFR BLD: 5.3 % (ref 4–5.6)
HCT VFR BLD AUTO: 37.8 % (ref 35–47)
HGB BLD-MCNC: 12.1 G/DL (ref 11.5–16)
HGB UR QL STRIP: NEGATIVE
IMM GRANULOCYTES # BLD AUTO: 0 K/UL (ref 0–0.04)
IMM GRANULOCYTES NFR BLD AUTO: 0 % (ref 0–0.5)
KETONES UR QL STRIP.AUTO: NEGATIVE MG/DL
LEUKOCYTE ESTERASE UR QL STRIP.AUTO: ABNORMAL
LYMPHOCYTES # BLD: 1.5 K/UL (ref 0.8–3.5)
LYMPHOCYTES NFR BLD: 22 % (ref 12–49)
MCH RBC QN AUTO: 31.2 PG (ref 26–34)
MCHC RBC AUTO-ENTMCNC: 32 G/DL (ref 30–36.5)
MCV RBC AUTO: 97.4 FL (ref 80–99)
MONOCYTES # BLD: 0.5 K/UL (ref 0–1)
MONOCYTES NFR BLD: 7 % (ref 5–13)
NEUTS SEG # BLD: 4.8 K/UL (ref 1.8–8)
NEUTS SEG NFR BLD: 69 % (ref 32–75)
NITRITE UR QL STRIP.AUTO: NEGATIVE
NRBC # BLD: 0 K/UL (ref 0–0.01)
NRBC BLD-RTO: 0 PER 100 WBC
PH UR STRIP: 6 [PH] (ref 5–8)
PLATELET # BLD AUTO: 273 K/UL (ref 150–400)
PMV BLD AUTO: 11.1 FL (ref 8.9–12.9)
POTASSIUM SERPL-SCNC: 4.3 MMOL/L (ref 3.5–5.1)
PROT SERPL-MCNC: 8.4 G/DL (ref 6.4–8.2)
PROT UR STRIP-MCNC: NEGATIVE MG/DL
RBC # BLD AUTO: 3.88 M/UL (ref 3.8–5.2)
RBC #/AREA URNS HPF: ABNORMAL /HPF (ref 0–5)
SODIUM SERPL-SCNC: 140 MMOL/L (ref 136–145)
SP GR UR REFRACTOMETRY: 1.02 (ref 1–1.03)
SPECIMEN EXP DATE BLD: NORMAL
UA: UC IF INDICATED,UAUC: ABNORMAL
UROBILINOGEN UR QL STRIP.AUTO: 0.2 EU/DL (ref 0.2–1)
WBC # BLD AUTO: 7 K/UL (ref 3.6–11)
WBC URNS QL MICRO: ABNORMAL /HPF (ref 0–4)

## 2021-11-15 PROCEDURE — 86901 BLOOD TYPING SEROLOGIC RH(D): CPT

## 2021-11-15 PROCEDURE — 36415 COLL VENOUS BLD VENIPUNCTURE: CPT

## 2021-11-15 PROCEDURE — 85025 COMPLETE CBC W/AUTO DIFF WBC: CPT

## 2021-11-15 PROCEDURE — 86140 C-REACTIVE PROTEIN: CPT

## 2021-11-15 PROCEDURE — 80053 COMPREHEN METABOLIC PANEL: CPT

## 2021-11-15 PROCEDURE — 87086 URINE CULTURE/COLONY COUNT: CPT

## 2021-11-15 PROCEDURE — 84466 ASSAY OF TRANSFERRIN: CPT

## 2021-11-15 PROCEDURE — 83036 HEMOGLOBIN GLYCOSYLATED A1C: CPT

## 2021-11-15 PROCEDURE — 85652 RBC SED RATE AUTOMATED: CPT

## 2021-11-15 PROCEDURE — 81001 URINALYSIS AUTO W/SCOPE: CPT

## 2021-11-15 RX ORDER — FAMOTIDINE 20 MG/1
20 TABLET, FILM COATED ORAL
COMMUNITY

## 2021-11-15 RX ORDER — CHOLECALCIFEROL TAB 125 MCG (5000 UNIT) 125 MCG
5000 TAB ORAL
COMMUNITY

## 2021-11-15 RX ORDER — WARFARIN 6 MG/1
6 TABLET ORAL
COMMUNITY

## 2021-11-15 RX ORDER — LISINOPRIL AND HYDROCHLOROTHIAZIDE 12.5; 2 MG/1; MG/1
2 TABLET ORAL
COMMUNITY

## 2021-11-15 RX ORDER — ATORVASTATIN CALCIUM 20 MG/1
20 TABLET, FILM COATED ORAL
COMMUNITY

## 2021-11-15 RX ORDER — WARFARIN 6 MG/1
9 TABLET ORAL
COMMUNITY

## 2021-11-15 RX ORDER — ACETAMINOPHEN 500 MG
1000 TABLET ORAL 4 TIMES DAILY
COMMUNITY
End: 2021-12-01

## 2021-11-15 NOTE — H&P
History and Physical    Patient: Yossi Barnes MRN: [id number]  SSN: [id number]    YOB: 1955  Age: 78 y.o. Sex: female      CC: Non-union Right femur    Subjective:      Yossi Barnes is a 78 y.o. female referred for pre-operative evaluation by Dr. Joshua Davis  for surgery on 21. Fariha Alonso sustained a fall on 21 where she had a right femur and right shoulder fracture. She had an ORIF done to her right leg and they took a wait and watch approach to her shoulder. She notes when she was rechecked at surgeon's office her femur has not healed correctly. She notes they were hoping the first surgery would have fixed everything but she shattered the bone when she fell. She notes no pain to her leg even with walking. She has been given instructions to only walk 15 feet at a time. She mostly stays in her chair and then walks to the kitchen. She is followed by Dr. Jennifer Danielson for her AFIB and is on coumadin. We have obtained the last note and EKG. The patient was evaluated in the surgeon's office and it was determined that the most appropriate plan of care is to proceed with surgical intervention.   Patient's PCP Yani Bryant MD    Past Medical History:   Diagnosis Date    A-fib [facility])     Anticoagulated on warfarin     Followed by Dr. Hammad Moses GERD (gastroesophageal reflux disease)     History of femur fracture 2021    Along with shoulder fracture    Hx of seasonal allergies     Hyperlipidemia     Hypertension     Seizures ([facility]      Past Surgical History:   Procedure Laterality Date    HX ANKLE FRACTURE TX Right     HX  SECTION      x 2    HX FEMUR FRACTURE [address] Right 2021    IR INJ SINUS TRACT THERAP  2010      Family History   Problem Relation Age of Onset    Heart Disease Mother     Heart Disease Father     Anesth Problems Neg Hx     Clotting Disorder Neg Hx      Social History     Tobacco Use    Smoking status: Never Smoker    Smokeless tobacco: Never Used   Substance Use Topics    Alcohol use: Never    Drug use: Never      Prior to Admission medications    Medication Sig Start Date End Date Taking? Authorizing Provider   atorvastatin (LIPITOR) 20 mg tablet Take 20 mg by mouth nightly. Yes Provider, Historical   lisinopril-hydroCHLOROthiazide (PRINZIDE, ZESTORETIC) 20-12.5 mg per tablet Take 2 Tablets by mouth nightly. Yes Provider, Historical   warfarin (COUMADIN) 6 mg tablet Take 6 mg by mouth every Monday and Friday. Yes Provider, Historical   warfarin (COUMADIN) 6 mg tablet Take 9 mg by mouth five (5) days a week. Yes Provider, Historical   acetaminophen (Tylenol Extra Strength) 500 mg tablet Take 1,000 mg by mouth four (4) times daily. Yes Provider, Historical   cholecalciferol (Vitamin D3) (5000 Units/125 mcg) tab tablet Take 5,000 Units by mouth nightly. Yes Provider, Historical   fluticasone propionate (FLONASE NA) 2 Sprays by Nasal route every morning. Yes Provider, Historical   OTHER,NON-FORMULARY, nightly. MedTerra CBD Relief and Recovery Cream 750 mg    Yes Provider, Historical   famotidine (PEPCID) 20 mg tablet Take 20 mg by mouth daily as needed for Heartburn. Yes Provider, Historical   metoprolol tartrate (LOPRESSOR) 100 mg IR tablet Take 1 Tablet by mouth two (2) times a day. 6/5/21  Yes Blank Batres, DO   albuterol (PROVENTIL HFA, VENTOLIN HFA, PROAIR HFA) 90 mcg/actuation inhaler Take 2 Puffs by inhalation every six (6) hours as needed for Wheezing. Yes Provider, Historical   fluticasone propion-salmeteroL (Wixela Inhub) 250-50 mcg/dose diskus inhaler Take 1 Puff by inhalation every twelve (12) hours. Yes Provider, Historical   PHENobarbitaL (LUMINAL) 32.4 mg tablet Take 129.6 mg by mouth nightly.  129.6 mg = 4 tablets   Yes Provider, Historical        No Known Allergies    Review of Systems:  Constitutional: Negative for chills and fever  HENT: Negative for congestion and sore throat  Eyes: negative for blurred vision and double vision  Respiratory: Negative for cough, shortness of breath and wheezing  Mouth: Negative for loose, broken or chipped teeth. Cardiovascular: Negative for chest pain and palpitations  Gastrointestinal: Negative for abdominal pain, constipation, diarrhea and nausea  Genitourinary: Negative for dysuria and hematuria  Musculoskeletal: Right shoulder pain  Skin: Negative for rash, open wounds.    Neurological: Negative for dizziness, tremors and headaches  Psychiatric: Negative for anxiety    Objective:     Vitals:    11/15/21 1313 11/15/21 1314   BP: (!) 192/79 (!) 177/77   Pulse: 74    Resp: 16    Temp: (!) 96 °F (35.6 °C)    SpO2: 96%    Weight: 81.6 kg (180 lb)    Height: 5' 4\" (1.626 m)         Physical Exam:  General Appearance: Alert, Well Appearing and in no distress  Mental Status: Normal mood, behavior, speech and dress  Neck: Normal appearance externally  Ears: External canal no drainage  Nose: Normal external appearance and no drainage   Chest: Clear to auscultation, no wheezes, rales or rhonchi  Heart: Normal rate, regular rhythm, no murmurs, rubs, clicks or gallops  Skin: Normal color, no lesions externally  Abdomen: Not examined  Neuro: Not examined  Musculoskeletal: Gait antalgic, using wheelchair     Recent Results (from the past 336 hour(s))   C REACTIVE PROTEIN, QT    Collection Time: 11/15/21  2:14 PM   Result Value Ref Range    C-Reactive protein 1.05 (H) 0.00 - 0.60 mg/dL   CBC WITH AUTOMATED DIFF    Collection Time: 11/15/21  2:14 PM   Result Value Ref Range    WBC 7.0 3.6 - 11.0 K/uL    RBC 3.88 3.80 - 5.20 M/uL    HGB 12.1 11.5 - 16.0 g/dL    HCT 37.8 35.0 - 47.0 %    MCV 97.4 80.0 - 99.0 FL    MCH 31.2 26.0 - 34.0 PG    MCHC 32.0 30.0 - 36.5 g/dL    RDW 13.5 11.5 - 14.5 %    PLATELET 187 358 - 490 K/uL    MPV 11.1 8.9 - 12.9 FL    NRBC 0.0 0  WBC    ABSOLUTE NRBC 0.00 0.00 - 0.01 K/uL    NEUTROPHILS 69 32 - 75 %    LYMPHOCYTES 22 12 - 49 %    MONOCYTES 7 5 - 13 %    EOSINOPHILS 1 0 - 7 %    BASOPHILS 1 0 - 1 %    IMMATURE GRANULOCYTES 0 0.0 - 0.5 %    ABS. NEUTROPHILS 4.8 1.8 - 8.0 K/UL    ABS. LYMPHOCYTES 1.5 0.8 - 3.5 K/UL    ABS. MONOCYTES 0.5 0.0 - 1.0 K/UL    ABS. EOSINOPHILS 0.1 0.0 - 0.4 K/UL    ABS. BASOPHILS 0.1 0.0 - 0.1 K/UL    ABS. IMM. GRANS. 0.0 0.00 - 0.04 K/UL    DF AUTOMATED     METABOLIC PANEL, COMPREHENSIVE    Collection Time: 11/15/21  2:14 PM   Result Value Ref Range    Sodium 140 136 - 145 mmol/L    Potassium 4.3 3.5 - 5.1 mmol/L    Chloride 105 97 - 108 mmol/L    CO2 29 21 - 32 mmol/L    Anion gap 6 5 - 15 mmol/L    Glucose 97 65 - 100 mg/dL    BUN 24 (H) 6 - 20 MG/DL    Creatinine 0.90 0.55 - 1.02 MG/DL    BUN/Creatinine ratio 27 (H) 12 - 20      GFR est AA >60 >60 ml/min/1.73m2    GFR est non-AA >60 >60 ml/min/1.73m2    Calcium 10.2 (H) 8.5 - 10.1 MG/DL    Bilirubin, total 0.3 0.2 - 1.0 MG/DL    ALT (SGPT) 22 12 - 78 U/L    AST (SGOT) 20 15 - 37 U/L    Alk. phosphatase 169 (H) 45 - 117 U/L    Protein, total 8.4 (H) 6.4 - 8.2 g/dL    Albumin 4.2 3.5 - 5.0 g/dL    Globulin 4.2 (H) 2.0 - 4.0 g/dL    A-G Ratio 1.0 (L) 1.1 - 2.2     HEMOGLOBIN A1C WITH EAG    Collection Time: 11/15/21  2:14 PM   Result Value Ref Range    Hemoglobin A1c 5.3 4.0 - 5.6 %    Est. average glucose 105 mg/dL   CULTURE, MRSA    Collection Time: 11/15/21  2:14 PM    Specimen: Nares; Nasal   Result Value Ref Range    Special Requests: NO SPECIAL REQUESTS      Culture result: MRSA NOT PRESENT      Culture result:        Screening of patient nares for MRSA is for surveillance purposes and, if positive, to facilitate isolation considerations in high risk settings. It is not intended for automatic decolonization interventions per se as regimens are not sufficiently effective to warrant routine use.    SED RATE (ESR)    Collection Time: 11/15/21  2:14 PM   Result Value Ref Range    Sed rate, automated 36 (H) 0 - 30 mm/hr   URINALYSIS W/ REFLEX CULTURE    Collection Time: 11/15/21 2:14 PM    Specimen: Urine   Result Value Ref Range    Color YELLOW/STRAW      Appearance CLEAR CLEAR      Specific gravity 1.024 1.003 - 1.030      pH (UA) 6.0 5.0 - 8.0      Protein Negative NEG mg/dL    Glucose Negative NEG mg/dL    Ketone Negative NEG mg/dL    Bilirubin Negative NEG      Blood Negative NEG      Urobilinogen 0.2 0.2 - 1.0 EU/dL    Nitrites Negative NEG      Leukocyte Esterase SMALL (A) NEG      WBC 10-20 0 - 4 /hpf    RBC 5-10 0 - 5 /hpf    Epithelial cells MODERATE (A) FEW /lpf    Bacteria Negative NEG /hpf    UA:UC IF INDICATED URINE CULTURE ORDERED (A) CNI     TYPE & SCREEN    Collection Time: 11/15/21  2:14 PM   Result Value Ref Range    Crossmatch Expiration 11/29/2021,2359     ABO/Rh(D) A POSITIVE     Antibody screen NEG    TRANSFERRIN    Collection Time: 11/15/21  2:14 PM   Result Value Ref Range    Transferrin 270 192 - 364 mg/dL   CULTURE, URINE    Collection Time: 11/15/21  2:14 PM    Specimen: Urine   Result Value Ref Range    Special Requests: NO SPECIAL REQUESTS  Reflexed from [id number]        Culture result: No growth (<1,000 CFU/ML)           Assessment:     Non-union post ORIF  Pre-Operative Evaluation    Plan:     Removal of hardware and right distal femoral replacement  Labs and EKG reviewed. MRSA Negative  Urine culture Negative    ESR and CRP sent to surgeon    Cardiac note states not cleared. Will have nurse manager to look into what cardiology testing needs to be done.      Signed By: Ok Blanco NP     November 26, 2021

## 2021-11-15 NOTE — PERIOP NOTES
ZAHIDA on nurse line at Dr. Marlys Kaiser office regarding note from 10/21/21 which states that patient is not cleared for surgery; Per Guilherme Adair NP, we requested specific information needed to obtain clearance and an updated note if patient has met clearance criteria; DOS 11/29/21

## 2021-11-15 NOTE — PERIOP NOTES
[address] , [address] [facility]   MAIN OR                                  [phone]   MAIN PRE OP                          [phone]                                                                                AMBULATORY PRE OP          [phone]  PRE-ADMISSION TESTING    [phone]   Surgery Date:  Monday November 29, 2021      Is surgery arrival time given by surgeon? YES  NO  If NO, [facility] staff will call you between 3 and 7pm the day before your surgery with your arrival time. (If your surgery is on a Monday, we will call you the Friday before.)    Call [phone] after 7pm Monday-Friday if you did not receive this call. INSTRUCTIONS BEFORE YOUR SURGERY   When You  Arrive Arrive at the [address] [facility] on the day of your surgery  Have your insurance card, photo ID, and any copayment (if needed)   Food   and   Drink NO food or drink after midnight the night before surgery    This means NO water, gum, mints, coffee, juice, etc.  No alcohol (beer, wine, liquor) 24 hours before and after surgery   Medications to   TAKE   Morning of Surgery MEDICATIONS TO TAKE THE MORNING OF SURGERY WITH A SIP OF WATER:    Tylenol, Metoprolol   Flonase, Wixela inhaler   Albuterol inhaler if needed and bring to hospital    Do not take Lisinopril-Hydrochlorthiazide the night before surgery! Medications  To  STOP      7 days before surgery  Non-Steroidal anti-inflammatory Drugs (NSAID's): for example, Ibuprofen (Advil, Motrin), Naproxen (Aleve)   Aspirin, if taking for pain    Herbal supplements, vitamins, and fish oil  (Pain medications not listed above, including Tylenol may be taken)   Blood  Thinners  If you take  Aspirin, Plavix, Coumadin, or any blood-thinning or anti-blood clot medicine, talk to the doctor who prescribed the medications for pre-operative instructions.  Per patient she was instructed by Dr. Abel Puente to stop Coumadin 5 days before surgery. Last dose on 11/23/21. Med plan to be sent to Dr. Janeth Welch to confirm. Bathing Clothing  Jewelry  Valuables      If you shower the morning of surgery, please do not apply anything to your skin (lotions, powders, deodorant, or makeup, especially mascara)   Follow Chlorhexidine Care Fusion body wash instructions provided to you during PAT appointment. Begin 3 days prior to surgery.  Do not shave or trim anywhere 24 hours before surgery   Wear your hair loose or down; no pony-tails, buns, or metal hair clips   Wear loose, comfortable, clean clothes   Wear glasses   Leave money, valuables, and jewelry, including body piercings, at home   If you were given an Duda, bring it on day of surgery. Going Home - or Spending the Night    ADMITS: If your doctor is keeping you in the hospital after surgery, leave personal belongings/luggage in your car until you have a hospital room number. Hospital discharge time is 12 noon  Drivers must be here before 12 noon unless you are told differently   Special Instructions   Special Instructions:  · Use Chlorhexidine Care Fusion wash and sponges 3 days prior to surgery as instructed. · Incentive spirometer given with instructions to practice at home and bring back to the hospital on the day of surgery. · [facility] will contact you if your Hemoglobin A1C is greater than 7.5. · Ensure/Glucerna  sample, nutritional information, and Ensure/Glucerna coupon given. · Pain pamphlet and Call Don't Fall reminder reviewed with patient. · Bring PTA Medication list day of surgery with the last doses taken documented      Your COVID test date is Friday November 26, 2021 between 8:00 am and 11:00 am.     COVID testing will be performed curbside at the [address]. There will be signs leading you to the testing site. You will need to bring a photo ID with you to be swabbed.      Patients are advised to self-quarantine at home after testing and prior to your surgery date. You will be notified if your results are positive. Follow all instructions so your surgery wont be cancelled. Please, be on time. If a situation occurs and you are delayed the day of surgery, call [phone]. If your physical condition changes (like a fever, cold, flu, etc.) call your surgeon. Home medication(s) reviewed and verified via  LIST   VERBAL   during PAT appointment. The patient was contacted by    IN-PERSON  The patient verbalizes understanding of all instructions and   DOES   DOES NOT   need reinforcement.

## 2021-11-16 LAB
BACTERIA SPEC CULT: NORMAL
SERVICE CMNT-IMP: NORMAL
SERVICE CMNT-IMP: NORMAL
TRANSFERRIN SERPL-MCNC: 270 MG/DL (ref 192–364)

## 2021-11-16 NOTE — PERIOP NOTES
Chela Nguyen from [facility]. They are waiting for records from other MD's to see if they can clear pt. Without further testing. Requests we call back on 11/18. Family

## 2021-11-19 NOTE — PERIOP NOTES
Patient notified to stop Coumadin 5 days prior to surgery per her prescribing doctor's recommendation. Patient verbalized understanding.

## 2021-11-26 ENCOUNTER — ANESTHESIA EVENT (OUTPATIENT)
Dept: SURGERY | Age: 66
End: 2021-11-26
Payer: MEDICARE

## 2021-11-26 ENCOUNTER — HOSPITAL ENCOUNTER (OUTPATIENT)
Dept: PREADMISSION TESTING | Age: 66
Discharge: HOME | End: 2021-11-26
Payer: MEDICARE

## 2021-11-26 PROCEDURE — U0005 INFEC AGEN DETEC AMPLI PROBE: HCPCS

## 2021-11-28 LAB
SARS-COV-2, XPLCVT: NOT DETECTED
SOURCE, COVRS: NORMAL

## 2021-11-29 ENCOUNTER — ANESTHESIA (OUTPATIENT)
Dept: SURGERY | Age: 66
End: 2021-11-29
Payer: MEDICARE

## 2021-11-29 ENCOUNTER — HOSPITAL ENCOUNTER (OUTPATIENT)
Age: 66
Setting detail: OBSERVATION
Discharge: HOME HEALTH | End: 2021-12-01
Attending: ORTHOPAEDIC SURGERY | Admitting: ORTHOPAEDIC SURGERY
Payer: MEDICARE

## 2021-11-29 ENCOUNTER — APPOINTMENT (OUTPATIENT)
Dept: GENERAL RADIOLOGY | Age: 66
End: 2021-11-29
Attending: PHYSICIAN ASSISTANT
Payer: MEDICARE

## 2021-11-29 DIAGNOSIS — M17.11 PRIMARY OSTEOARTHRITIS OF RIGHT KNEE: Primary | ICD-10-CM

## 2021-11-29 DIAGNOSIS — Z96.9 RETAINED ORTHOPEDIC HARDWARE: ICD-10-CM

## 2021-11-29 PROCEDURE — 74011250636 HC RX REV CODE- 250/636: Performed by: PHYSICIAN ASSISTANT

## 2021-11-29 PROCEDURE — 74011250637 HC RX REV CODE- 250/637: Performed by: ANESTHESIOLOGY

## 2021-11-29 PROCEDURE — 76030000024 HC AMB SURG 3.5 TO 4 HR INTENSV-TIER 1: Performed by: ORTHOPAEDIC SURGERY

## 2021-11-29 PROCEDURE — C1776 JOINT DEVICE (IMPLANTABLE): HCPCS | Performed by: ORTHOPAEDIC SURGERY

## 2021-11-29 PROCEDURE — 74011000250 HC RX REV CODE- 250: Performed by: NURSE ANESTHETIST, CERTIFIED REGISTERED

## 2021-11-29 PROCEDURE — 77030018723 HC ELCTRD BLD COVD -A: Performed by: ORTHOPAEDIC SURGERY

## 2021-11-29 PROCEDURE — 74011000250 HC RX REV CODE- 250: Performed by: PHYSICIAN ASSISTANT

## 2021-11-29 PROCEDURE — 76210000038 HC AMBSU PH I REC 2.5 TO 3 HR: Performed by: ORTHOPAEDIC SURGERY

## 2021-11-29 PROCEDURE — 74011250636 HC RX REV CODE- 250/636: Performed by: NURSE ANESTHETIST, CERTIFIED REGISTERED

## 2021-11-29 PROCEDURE — 76060000067 HC AMB SURG ANES 3.5 TO 4 HR: Performed by: ORTHOPAEDIC SURGERY

## 2021-11-29 PROCEDURE — 74011000258 HC RX REV CODE- 258: Performed by: NURSE ANESTHETIST, CERTIFIED REGISTERED

## 2021-11-29 PROCEDURE — 74011250636 HC RX REV CODE- 250/636: Performed by: ANESTHESIOLOGY

## 2021-11-29 PROCEDURE — 77030033067 HC SUT PDO STRATFX SPIR J&J -B: Performed by: ORTHOPAEDIC SURGERY

## 2021-11-29 PROCEDURE — G0378 HOSPITAL OBSERVATION PER HR: HCPCS

## 2021-11-29 PROCEDURE — 74011000250 HC RX REV CODE- 250: Performed by: ORTHOPAEDIC SURGERY

## 2021-11-29 PROCEDURE — 2709999900 HC NON-CHARGEABLE SUPPLY: Performed by: ORTHOPAEDIC SURGERY

## 2021-11-29 PROCEDURE — C1713 ANCHOR/SCREW BN/BN,TIS/BN: HCPCS | Performed by: ORTHOPAEDIC SURGERY

## 2021-11-29 PROCEDURE — 77030040361 HC SLV COMPR DVT MDII -B

## 2021-11-29 PROCEDURE — 77030011467 HC KT BN PREP STRY -C: Performed by: ORTHOPAEDIC SURGERY

## 2021-11-29 PROCEDURE — 77030010785: Performed by: ORTHOPAEDIC SURGERY

## 2021-11-29 PROCEDURE — 73560 X-RAY EXAM OF KNEE 1 OR 2: CPT

## 2021-11-29 PROCEDURE — 77030000032 HC CUF TRNQT ZIMM -B: Performed by: ORTHOPAEDIC SURGERY

## 2021-11-29 PROCEDURE — 77030013079 HC BLNKT BAIR HGGR 3M -A: Performed by: ANESTHESIOLOGY

## 2021-11-29 PROCEDURE — 77030040922 HC BLNKT HYPOTHRM STRY -A

## 2021-11-29 PROCEDURE — 74011250637 HC RX REV CODE- 250/637: Performed by: PHYSICIAN ASSISTANT

## 2021-11-29 PROCEDURE — 77030006835 HC BLD SAW SAG STRY -B: Performed by: ORTHOPAEDIC SURGERY

## 2021-11-29 PROCEDURE — 77030028907 HC WRP KNEE WO BGS SOLM -B

## 2021-11-29 PROCEDURE — 77030038269 HC DRN EXT URIN PURWCK BARD -A

## 2021-11-29 PROCEDURE — 77030002933 HC SUT MCRYL J&J -A: Performed by: ORTHOPAEDIC SURGERY

## 2021-11-29 PROCEDURE — 77030031139 HC SUT VCRL2 J&J -A: Performed by: ORTHOPAEDIC SURGERY

## 2021-11-29 PROCEDURE — 77030008684 HC TU ET CUF COVD -B: Performed by: ANESTHESIOLOGY

## 2021-11-29 PROCEDURE — 77030003601 HC NDL NRV BLK BBMI -A: Performed by: ANESTHESIOLOGY

## 2021-11-29 PROCEDURE — P9045 ALBUMIN (HUMAN), 5%, 250 ML: HCPCS | Performed by: NURSE ANESTHETIST, CERTIFIED REGISTERED

## 2021-11-29 PROCEDURE — 77030026438 HC STYL ET INTUB CARD -A: Performed by: ANESTHESIOLOGY

## 2021-11-29 PROCEDURE — 74011250636 HC RX REV CODE- 250/636: Performed by: ORTHOPAEDIC SURGERY

## 2021-11-29 DEVICE — MRH KNEE TIBIAL INSERT
Type: IMPLANTABLE DEVICE | Site: KNEE | Status: FUNCTIONAL
Brand: MRH

## 2021-11-29 DEVICE — STRAIGHT CEMENTED STEM-REPLACEMENT
Type: IMPLANTABLE DEVICE | Site: KNEE | Status: FUNCTIONAL
Brand: MRS

## 2021-11-29 DEVICE — MRH KNEE TIBIAL BASEPLATE
Type: IMPLANTABLE DEVICE | Site: KNEE | Status: FUNCTIONAL
Brand: MRH

## 2021-11-29 DEVICE — DISTAL FEMORAL COMPONENT
Type: IMPLANTABLE DEVICE | Site: KNEE | Status: FUNCTIONAL
Brand: GMRS

## 2021-11-29 DEVICE — MRH KNEE TIBIAL SLEEVE
Type: IMPLANTABLE DEVICE | Site: KNEE | Status: FUNCTIONAL
Brand: MRH

## 2021-11-29 DEVICE — EXTENSION PIECE
Type: IMPLANTABLE DEVICE | Site: KNEE | Status: FUNCTIONAL
Brand: GMRS

## 2021-11-29 DEVICE — MRH KNEE BUMPER INSERT
Type: IMPLANTABLE DEVICE | Site: KNEE | Status: FUNCTIONAL
Brand: MRH

## 2021-11-29 DEVICE — MRH TIBIAL ROTATING COMPONENT
Type: IMPLANTABLE DEVICE | Site: KNEE | Status: FUNCTIONAL
Brand: MRH

## 2021-11-29 DEVICE — MRH KNEE FEMORAL BUSHING
Type: IMPLANTABLE DEVICE | Site: KNEE | Status: FUNCTIONAL
Brand: MRH

## 2021-11-29 DEVICE — MODULAR ROTATING HINGE KNEE-AXLE
Type: IMPLANTABLE DEVICE | Site: KNEE | Status: FUNCTIONAL
Brand: MRH

## 2021-11-29 DEVICE — COMPONENT PAT DIA31MM THK8MM KNEE X3 SYMMETRIC TRIATHLON: Type: IMPLANTABLE DEVICE | Site: KNEE | Status: FUNCTIONAL

## 2021-11-29 DEVICE — COBALT CHROME STEM
Type: IMPLANTABLE DEVICE | Site: KNEE | Status: FUNCTIONAL
Brand: DURACON

## 2021-11-29 DEVICE — CEMENT BNE 40GM FULL DOSE PMMA W/O ANTIBIO M VISC RADPQ: Type: IMPLANTABLE DEVICE | Site: KNEE | Status: FUNCTIONAL

## 2021-11-29 DEVICE — BONE PREPARATION KIT
Type: IMPLANTABLE DEVICE | Site: KNEE | Status: FUNCTIONAL
Brand: BIOPREP

## 2021-11-29 RX ORDER — ONDANSETRON 2 MG/ML
4 INJECTION INTRAMUSCULAR; INTRAVENOUS
Status: ACTIVE | OUTPATIENT
Start: 2021-11-29 | End: 2021-11-30

## 2021-11-29 RX ORDER — ACETAMINOPHEN 325 MG/1
650 TABLET ORAL EVERY 6 HOURS
Status: DISCONTINUED | OUTPATIENT
Start: 2021-11-30 | End: 2021-12-01 | Stop reason: HOSPADM

## 2021-11-29 RX ORDER — ONDANSETRON 2 MG/ML
4 INJECTION INTRAMUSCULAR; INTRAVENOUS AS NEEDED
Status: DISCONTINUED | OUTPATIENT
Start: 2021-11-29 | End: 2021-11-29 | Stop reason: HOSPADM

## 2021-11-29 RX ORDER — IBUPROFEN 800 MG/1
800 TABLET ORAL
Qty: 50 TABLET | Refills: 2 | Status: SHIPPED | OUTPATIENT
Start: 2021-11-29

## 2021-11-29 RX ORDER — SODIUM CHLORIDE, SODIUM LACTATE, POTASSIUM CHLORIDE, CALCIUM CHLORIDE 600; 310; 30; 20 MG/100ML; MG/100ML; MG/100ML; MG/100ML
125 INJECTION, SOLUTION INTRAVENOUS CONTINUOUS
Status: DISCONTINUED | OUTPATIENT
Start: 2021-11-29 | End: 2021-11-29 | Stop reason: HOSPADM

## 2021-11-29 RX ORDER — NALOXONE HYDROCHLORIDE 0.4 MG/ML
0.4 INJECTION, SOLUTION INTRAMUSCULAR; INTRAVENOUS; SUBCUTANEOUS AS NEEDED
Status: DISCONTINUED | OUTPATIENT
Start: 2021-11-29 | End: 2021-12-01 | Stop reason: HOSPADM

## 2021-11-29 RX ORDER — SODIUM CHLORIDE 0.9 % (FLUSH) 0.9 %
5-40 SYRINGE (ML) INJECTION EVERY 8 HOURS
Status: DISCONTINUED | OUTPATIENT
Start: 2021-11-30 | End: 2021-12-01 | Stop reason: HOSPADM

## 2021-11-29 RX ORDER — FAMOTIDINE 20 MG/1
20 TABLET, FILM COATED ORAL
Status: DISCONTINUED | OUTPATIENT
Start: 2021-11-29 | End: 2021-11-29 | Stop reason: SDUPTHER

## 2021-11-29 RX ORDER — CELECOXIB 100 MG/1
100 CAPSULE ORAL ONCE
Status: COMPLETED | OUTPATIENT
Start: 2021-11-29 | End: 2021-11-29

## 2021-11-29 RX ORDER — AMOXICILLIN 250 MG
1 CAPSULE ORAL 2 TIMES DAILY
Status: DISCONTINUED | OUTPATIENT
Start: 2021-11-30 | End: 2021-12-01 | Stop reason: HOSPADM

## 2021-11-29 RX ORDER — DEXAMETHASONE SODIUM PHOSPHATE 4 MG/ML
INJECTION, SOLUTION INTRA-ARTICULAR; INTRALESIONAL; INTRAMUSCULAR; INTRAVENOUS; SOFT TISSUE AS NEEDED
Status: DISCONTINUED | OUTPATIENT
Start: 2021-11-29 | End: 2021-11-29 | Stop reason: HOSPADM

## 2021-11-29 RX ORDER — ENOXAPARIN SODIUM 100 MG/ML
30 INJECTION SUBCUTANEOUS EVERY 12 HOURS
Status: DISCONTINUED | OUTPATIENT
Start: 2021-11-29 | End: 2021-12-01 | Stop reason: HOSPADM

## 2021-11-29 RX ORDER — ROCURONIUM BROMIDE 10 MG/ML
INJECTION, SOLUTION INTRAVENOUS AS NEEDED
Status: DISCONTINUED | OUTPATIENT
Start: 2021-11-29 | End: 2021-11-29 | Stop reason: HOSPADM

## 2021-11-29 RX ORDER — ALBUTEROL SULFATE 0.83 MG/ML
2.5 SOLUTION RESPIRATORY (INHALATION)
Status: DISCONTINUED | OUTPATIENT
Start: 2021-11-29 | End: 2021-12-01 | Stop reason: HOSPADM

## 2021-11-29 RX ORDER — ALBUMIN HUMAN 50 G/1000ML
SOLUTION INTRAVENOUS AS NEEDED
Status: DISCONTINUED | OUTPATIENT
Start: 2021-11-29 | End: 2021-11-29 | Stop reason: HOSPADM

## 2021-11-29 RX ORDER — DIPHENHYDRAMINE HYDROCHLORIDE 50 MG/ML
12.5 INJECTION, SOLUTION INTRAMUSCULAR; INTRAVENOUS AS NEEDED
Status: DISCONTINUED | OUTPATIENT
Start: 2021-11-29 | End: 2021-11-29 | Stop reason: HOSPADM

## 2021-11-29 RX ORDER — ACETAMINOPHEN 500 MG
975 TABLET ORAL
Qty: 60 TABLET | Refills: 1 | Status: SHIPPED | OUTPATIENT
Start: 2021-11-29

## 2021-11-29 RX ORDER — POLYETHYLENE GLYCOL 3350 17 G/17G
17 POWDER, FOR SOLUTION ORAL DAILY
Status: DISCONTINUED | OUTPATIENT
Start: 2021-11-30 | End: 2021-12-01 | Stop reason: HOSPADM

## 2021-11-29 RX ORDER — FACIAL-BODY WIPES
10 EACH TOPICAL DAILY PRN
Status: DISCONTINUED | OUTPATIENT
Start: 2021-12-01 | End: 2021-12-01 | Stop reason: HOSPADM

## 2021-11-29 RX ORDER — EPHEDRINE SULFATE/0.9% NACL/PF 50 MG/5 ML
SYRINGE (ML) INTRAVENOUS AS NEEDED
Status: DISCONTINUED | OUTPATIENT
Start: 2021-11-29 | End: 2021-11-29 | Stop reason: HOSPADM

## 2021-11-29 RX ORDER — KETOROLAC TROMETHAMINE 30 MG/ML
15 INJECTION, SOLUTION INTRAMUSCULAR; INTRAVENOUS
Status: DISCONTINUED | OUTPATIENT
Start: 2021-11-29 | End: 2021-11-29 | Stop reason: HOSPADM

## 2021-11-29 RX ORDER — HYDROMORPHONE HYDROCHLORIDE 1 MG/ML
0.5 INJECTION, SOLUTION INTRAMUSCULAR; INTRAVENOUS; SUBCUTANEOUS
Status: ACTIVE | OUTPATIENT
Start: 2021-11-29 | End: 2021-11-30

## 2021-11-29 RX ORDER — PROPOFOL 10 MG/ML
INJECTION, EMULSION INTRAVENOUS AS NEEDED
Status: DISCONTINUED | OUTPATIENT
Start: 2021-11-29 | End: 2021-11-29 | Stop reason: HOSPADM

## 2021-11-29 RX ORDER — ALBUTEROL SULFATE 0.83 MG/ML
2.5 SOLUTION RESPIRATORY (INHALATION) AS NEEDED
Status: DISCONTINUED | OUTPATIENT
Start: 2021-11-29 | End: 2021-11-29 | Stop reason: HOSPADM

## 2021-11-29 RX ORDER — OXYCODONE HCL 10 MG/1
10 TABLET, FILM COATED, EXTENDED RELEASE ORAL ONCE
Status: COMPLETED | OUTPATIENT
Start: 2021-11-29 | End: 2021-11-29

## 2021-11-29 RX ORDER — ACETAMINOPHEN 500 MG
1000 TABLET ORAL 4 TIMES DAILY
Status: DISCONTINUED | OUTPATIENT
Start: 2021-11-30 | End: 2021-11-29 | Stop reason: SDUPTHER

## 2021-11-29 RX ORDER — SODIUM CHLORIDE, SODIUM LACTATE, POTASSIUM CHLORIDE, CALCIUM CHLORIDE 600; 310; 30; 20 MG/100ML; MG/100ML; MG/100ML; MG/100ML
150 INJECTION, SOLUTION INTRAVENOUS CONTINUOUS
Status: DISCONTINUED | OUTPATIENT
Start: 2021-11-29 | End: 2021-11-29 | Stop reason: HOSPADM

## 2021-11-29 RX ORDER — KETOROLAC TROMETHAMINE 15 MG/ML
INJECTION, SOLUTION INTRAMUSCULAR; INTRAVENOUS AS NEEDED
Status: DISCONTINUED | OUTPATIENT
Start: 2021-11-29 | End: 2021-11-29 | Stop reason: HOSPADM

## 2021-11-29 RX ORDER — PREGABALIN 75 MG/1
75 CAPSULE ORAL
Status: DISCONTINUED | OUTPATIENT
Start: 2021-11-30 | End: 2021-12-01 | Stop reason: HOSPADM

## 2021-11-29 RX ORDER — SODIUM CHLORIDE 0.9 % (FLUSH) 0.9 %
5-40 SYRINGE (ML) INJECTION AS NEEDED
Status: DISCONTINUED | OUTPATIENT
Start: 2021-11-29 | End: 2021-12-01 | Stop reason: HOSPADM

## 2021-11-29 RX ORDER — FENTANYL CITRATE 50 UG/ML
INJECTION, SOLUTION INTRAMUSCULAR; INTRAVENOUS AS NEEDED
Status: DISCONTINUED | OUTPATIENT
Start: 2021-11-29 | End: 2021-11-29 | Stop reason: HOSPADM

## 2021-11-29 RX ORDER — DIPHENHYDRAMINE HYDROCHLORIDE 50 MG/ML
12.5 INJECTION, SOLUTION INTRAMUSCULAR; INTRAVENOUS
Status: ACTIVE | OUTPATIENT
Start: 2021-11-29 | End: 2021-11-30

## 2021-11-29 RX ORDER — TRAMADOL HYDROCHLORIDE 50 MG/1
50 TABLET ORAL
Qty: 28 TABLET | Refills: 0 | Status: SHIPPED | OUTPATIENT
Start: 2021-11-29 | End: 2021-12-06

## 2021-11-29 RX ORDER — PHENOBARBITAL 32.4 MG/1
129.6 TABLET ORAL
Status: DISCONTINUED | OUTPATIENT
Start: 2021-11-30 | End: 2021-12-01 | Stop reason: HOSPADM

## 2021-11-29 RX ORDER — OXYCODONE HYDROCHLORIDE 5 MG/1
5 TABLET ORAL
Status: DISCONTINUED | OUTPATIENT
Start: 2021-11-29 | End: 2021-12-01 | Stop reason: HOSPADM

## 2021-11-29 RX ORDER — MIDAZOLAM HYDROCHLORIDE 1 MG/ML
INJECTION, SOLUTION INTRAMUSCULAR; INTRAVENOUS AS NEEDED
Status: DISCONTINUED | OUTPATIENT
Start: 2021-11-29 | End: 2021-11-29 | Stop reason: HOSPADM

## 2021-11-29 RX ORDER — ONDANSETRON 2 MG/ML
INJECTION INTRAMUSCULAR; INTRAVENOUS AS NEEDED
Status: DISCONTINUED | OUTPATIENT
Start: 2021-11-29 | End: 2021-11-29 | Stop reason: HOSPADM

## 2021-11-29 RX ORDER — ACETAMINOPHEN 325 MG/1
975 TABLET ORAL ONCE
Status: COMPLETED | OUTPATIENT
Start: 2021-11-29 | End: 2021-11-29

## 2021-11-29 RX ORDER — PREGABALIN 75 MG/1
75 CAPSULE ORAL ONCE
Status: COMPLETED | OUTPATIENT
Start: 2021-11-29 | End: 2021-11-29

## 2021-11-29 RX ORDER — CHOLECALCIFEROL TAB 125 MCG (5000 UNIT) 125 MCG
5000 TAB ORAL
Status: DISCONTINUED | OUTPATIENT
Start: 2021-11-30 | End: 2021-12-01 | Stop reason: HOSPADM

## 2021-11-29 RX ORDER — METOPROLOL TARTRATE 50 MG/1
100 TABLET ORAL 2 TIMES DAILY
Status: DISCONTINUED | OUTPATIENT
Start: 2021-11-30 | End: 2021-12-01 | Stop reason: HOSPADM

## 2021-11-29 RX ORDER — FAMOTIDINE 20 MG/1
20 TABLET, FILM COATED ORAL 2 TIMES DAILY
Status: DISCONTINUED | OUTPATIENT
Start: 2021-11-30 | End: 2021-12-01 | Stop reason: HOSPADM

## 2021-11-29 RX ORDER — LIDOCAINE HYDROCHLORIDE 20 MG/ML
INJECTION, SOLUTION EPIDURAL; INFILTRATION; INTRACAUDAL; PERINEURAL AS NEEDED
Status: DISCONTINUED | OUTPATIENT
Start: 2021-11-29 | End: 2021-11-29 | Stop reason: HOSPADM

## 2021-11-29 RX ORDER — LIDOCAINE HYDROCHLORIDE 10 MG/ML
0.1 INJECTION, SOLUTION EPIDURAL; INFILTRATION; INTRACAUDAL; PERINEURAL AS NEEDED
Status: DISCONTINUED | OUTPATIENT
Start: 2021-11-29 | End: 2021-11-29 | Stop reason: HOSPADM

## 2021-11-29 RX ORDER — CELECOXIB 100 MG/1
200 CAPSULE ORAL 2 TIMES DAILY
Status: DISCONTINUED | OUTPATIENT
Start: 2021-11-30 | End: 2021-12-01 | Stop reason: HOSPADM

## 2021-11-29 RX ORDER — HYDROMORPHONE HYDROCHLORIDE 1 MG/ML
0.5 INJECTION, SOLUTION INTRAMUSCULAR; INTRAVENOUS; SUBCUTANEOUS
Status: DISCONTINUED | OUTPATIENT
Start: 2021-11-29 | End: 2021-11-29 | Stop reason: HOSPADM

## 2021-11-29 RX ORDER — OXYCODONE HYDROCHLORIDE 5 MG/1
5 TABLET ORAL
Qty: 42 TABLET | Refills: 0 | Status: SHIPPED | OUTPATIENT
Start: 2021-11-29 | End: 2021-12-06

## 2021-11-29 RX ORDER — ONDANSETRON 8 MG/1
4 TABLET, ORALLY DISINTEGRATING ORAL
Qty: 30 TABLET | Refills: 0 | Status: SHIPPED | OUTPATIENT
Start: 2021-11-29

## 2021-11-29 RX ORDER — ROPIVACAINE HYDROCHLORIDE 5 MG/ML
INJECTION, SOLUTION EPIDURAL; INFILTRATION; PERINEURAL
Status: SHIPPED | OUTPATIENT
Start: 2021-11-29 | End: 2021-11-29

## 2021-11-29 RX ORDER — OXYCODONE HYDROCHLORIDE 5 MG/1
10 TABLET ORAL
Status: DISCONTINUED | OUTPATIENT
Start: 2021-11-29 | End: 2021-12-01 | Stop reason: HOSPADM

## 2021-11-29 RX ORDER — FLUTICASONE PROPIONATE 50 MCG
2 SPRAY, SUSPENSION (ML) NASAL DAILY
Status: DISCONTINUED | OUTPATIENT
Start: 2021-11-30 | End: 2021-12-01 | Stop reason: HOSPADM

## 2021-11-29 RX ORDER — ASPIRIN 81 MG/1
81 TABLET ORAL 2 TIMES DAILY
Qty: 60 TABLET | Refills: 0 | Status: SHIPPED | OUTPATIENT
Start: 2021-11-29 | End: 2021-12-01

## 2021-11-29 RX ORDER — SODIUM CHLORIDE 9 MG/ML
125 INJECTION, SOLUTION INTRAVENOUS CONTINUOUS
Status: DISPENSED | OUTPATIENT
Start: 2021-11-29 | End: 2021-11-30

## 2021-11-29 RX ORDER — ATORVASTATIN CALCIUM 20 MG/1
20 TABLET, FILM COATED ORAL
Status: DISCONTINUED | OUTPATIENT
Start: 2021-11-30 | End: 2021-12-01 | Stop reason: HOSPADM

## 2021-11-29 RX ORDER — OXYCODONE HYDROCHLORIDE 5 MG/1
10 TABLET ORAL
Status: DISCONTINUED | OUTPATIENT
Start: 2021-11-29 | End: 2021-11-29 | Stop reason: HOSPADM

## 2021-11-29 RX ADMIN — ROCURONIUM BROMIDE 20 MG: 10 INJECTION INTRAVENOUS at 15:22

## 2021-11-29 RX ADMIN — FENTANYL CITRATE 100 MCG: 50 INJECTION, SOLUTION INTRAMUSCULAR; INTRAVENOUS at 13:47

## 2021-11-29 RX ADMIN — DEXAMETHASONE SODIUM PHOSPHATE 8 MG: 4 INJECTION, SOLUTION INTRAMUSCULAR; INTRAVENOUS at 14:57

## 2021-11-29 RX ADMIN — LIDOCAINE HYDROCHLORIDE 100 MG: 20 INJECTION, SOLUTION INTRAVENOUS at 17:48

## 2021-11-29 RX ADMIN — ONDANSETRON HYDROCHLORIDE 4 MG: 2 SOLUTION INTRAMUSCULAR; INTRAVENOUS at 17:12

## 2021-11-29 RX ADMIN — WATER 2 G: 1 INJECTION INTRAMUSCULAR; INTRAVENOUS; SUBCUTANEOUS at 20:14

## 2021-11-29 RX ADMIN — ACETAMINOPHEN 975 MG: 325 TABLET ORAL at 12:30

## 2021-11-29 RX ADMIN — PREGABALIN 75 MG: 75 CAPSULE ORAL at 12:30

## 2021-11-29 RX ADMIN — ROCURONIUM BROMIDE 20 MG: 10 INJECTION INTRAVENOUS at 14:54

## 2021-11-29 RX ADMIN — SODIUM CHLORIDE, POTASSIUM CHLORIDE, SODIUM LACTATE AND CALCIUM CHLORIDE 150 ML/HR: 600; 310; 30; 20 INJECTION, SOLUTION INTRAVENOUS at 12:58

## 2021-11-29 RX ADMIN — FENTANYL CITRATE 100 MCG: 50 INJECTION, SOLUTION INTRAMUSCULAR; INTRAVENOUS at 14:40

## 2021-11-29 RX ADMIN — OXYCODONE HYDROCHLORIDE 10 MG: 10 TABLET, FILM COATED, EXTENDED RELEASE ORAL at 12:30

## 2021-11-29 RX ADMIN — SODIUM CHLORIDE 125 ML/HR: 9 INJECTION, SOLUTION INTRAVENOUS at 20:06

## 2021-11-29 RX ADMIN — FENTANYL CITRATE 25 MCG: 50 INJECTION, SOLUTION INTRAMUSCULAR; INTRAVENOUS at 17:10

## 2021-11-29 RX ADMIN — MIDAZOLAM HYDROCHLORIDE 2 MG: 1 INJECTION, SOLUTION INTRAMUSCULAR; INTRAVENOUS at 13:47

## 2021-11-29 RX ADMIN — SODIUM CHLORIDE, POTASSIUM CHLORIDE, SODIUM LACTATE AND CALCIUM CHLORIDE: 600; 310; 30; 20 INJECTION, SOLUTION INTRAVENOUS at 15:57

## 2021-11-29 RX ADMIN — FENTANYL CITRATE 50 MCG: 50 INJECTION, SOLUTION INTRAMUSCULAR; INTRAVENOUS at 14:15

## 2021-11-29 RX ADMIN — FENTANYL CITRATE 25 MCG: 50 INJECTION, SOLUTION INTRAMUSCULAR; INTRAVENOUS at 17:50

## 2021-11-29 RX ADMIN — ROCURONIUM BROMIDE 20 MG: 10 INJECTION INTRAVENOUS at 16:07

## 2021-11-29 RX ADMIN — FENTANYL CITRATE 25 MCG: 50 INJECTION, SOLUTION INTRAMUSCULAR; INTRAVENOUS at 17:29

## 2021-11-29 RX ADMIN — Medication 10 MG: at 14:47

## 2021-11-29 RX ADMIN — CEFAZOLIN SODIUM 2 G: 1 POWDER, FOR SOLUTION INTRAMUSCULAR; INTRAVENOUS at 14:07

## 2021-11-29 RX ADMIN — SODIUM CHLORIDE, POTASSIUM CHLORIDE, SODIUM LACTATE AND CALCIUM CHLORIDE: 600; 310; 30; 20 INJECTION, SOLUTION INTRAVENOUS at 14:47

## 2021-11-29 RX ADMIN — KETOROLAC TROMETHAMINE 30 MG: 15 INJECTION, SOLUTION INTRAMUSCULAR; INTRAVENOUS at 17:15

## 2021-11-29 RX ADMIN — ROPIVACAINE HYDROCHLORIDE 15 ML: 5 INJECTION, SOLUTION EPIDURAL; INFILTRATION; PERINEURAL at 13:54

## 2021-11-29 RX ADMIN — Medication 15 MG: at 16:00

## 2021-11-29 RX ADMIN — FENTANYL CITRATE 25 MCG: 50 INJECTION, SOLUTION INTRAMUSCULAR; INTRAVENOUS at 17:08

## 2021-11-29 RX ADMIN — PROPOFOL 200 MG: 10 INJECTION, EMULSION INTRAVENOUS at 14:16

## 2021-11-29 RX ADMIN — ROCURONIUM BROMIDE 50 MG: 10 INJECTION INTRAVENOUS at 14:16

## 2021-11-29 RX ADMIN — ROCURONIUM BROMIDE 10 MG: 10 INJECTION INTRAVENOUS at 15:36

## 2021-11-29 RX ADMIN — ALBUMIN (HUMAN) 250 ML: 12.5 SOLUTION INTRAVENOUS at 15:57

## 2021-11-29 RX ADMIN — MIDAZOLAM HYDROCHLORIDE 3 MG: 1 INJECTION, SOLUTION INTRAMUSCULAR; INTRAVENOUS at 14:16

## 2021-11-29 RX ADMIN — SODIUM CHLORIDE 1 G: 9 INJECTION, SOLUTION INTRAVENOUS at 14:23

## 2021-11-29 RX ADMIN — FENTANYL CITRATE 50 MCG: 50 INJECTION, SOLUTION INTRAMUSCULAR; INTRAVENOUS at 16:36

## 2021-11-29 RX ADMIN — CELECOXIB 100 MG: 100 CAPSULE ORAL at 12:30

## 2021-11-29 RX ADMIN — FENTANYL CITRATE 50 MCG: 50 INJECTION, SOLUTION INTRAMUSCULAR; INTRAVENOUS at 15:36

## 2021-11-29 NOTE — ANESTHESIA PROCEDURE NOTES
Peripheral Block    Start time: 11/29/2021 1:47 PM  End time: 11/29/2021 1:55 PM  Performed by: Julius Allen CRNA  Authorized by: Julius Allen CRNA       Pre-procedure:    Indications: at surgeon's request and post-op pain management    Preanesthetic Checklist: patient identified, risks and benefits discussed, site marked, timeout performed, anesthesia consent given and patient being monitored    Timeout Time: 13:47 EST          Block Type:   Block Type:  Femoral single shot  Laterality:  Right  Monitoring:  Standard ASA monitoring  Injection Technique:  Single shot  Procedures: ultrasound guided    Patient Position: supine  Prep: chlorhexidine    Needle Gauge:  21 G  Needle Localization:  Ultrasound guidance  Medication Injected:  Ropivacaine (PF) (NAROPIN)(0.5%) 5 mg/mL injection, 15 mL  Med Admin Time: 11/29/2021 1:54 PM    Assessment:    Injection Assessment:

## 2021-11-29 NOTE — ANESTHESIA PREPROCEDURE EVALUATION
Relevant Problems   No relevant active problems       Anesthetic History   No history of anesthetic complications            Review of Systems / Medical History  Patient summary reviewed, nursing notes reviewed and pertinent labs reviewed    Pulmonary  Within defined limits                 Neuro/Psych     seizures         Cardiovascular    Hypertension        Dysrhythmias : atrial fibrillation      Exercise tolerance: >4 METS     GI/Hepatic/Renal     GERD           Endo/Other        Obesity and arthritis     Other Findings              Physical Exam    Airway  Mallampati: II    Neck ROM: normal range of motion   Mouth opening: Normal     Cardiovascular  Regular rate and rhythm,  S1 and S2 normal,  no murmur, click, rub, or gallop  Rhythm: regular  Rate: normal         Dental  No notable dental hx       Pulmonary  Breath sounds clear to auscultation               Abdominal  GI exam deferred       Other Findings            Anesthetic Plan    ASA: 2  Anesthesia type: general and regional - femoral single shot          Induction: Intravenous  Anesthetic plan and risks discussed with: Patient      Discussed options regarding blocks and spinal. Pt states she does not want a spinal and does not want a sciatic or popliteal block - plan for just a femoral nerve block and general anesthesia

## 2021-11-29 NOTE — ANESTHESIA POSTPROCEDURE EVALUATION
Procedure(s):  REMOVAL OF HARDWARE AND RIGHT DISTAL FEMORAL REPLACEMENT (MAKOPLASTY) (GENERAL WITH FEMORAL NERVE BLOCK AND SCIATIC NERVE BLOCK). general, regional    Anesthesia Post Evaluation      Multimodal analgesia: multimodal analgesia used between 6 hours prior to anesthesia start to PACU discharge  Patient location during evaluation: PACU  Patient participation: complete - patient participated  Level of consciousness: sleepy but conscious  Pain management: adequate  Airway patency: patent  Anesthetic complications: no  Cardiovascular status: acceptable and stable  Respiratory status: acceptable and unassisted  Hydration status: acceptable  Comments: The patient was seen and evaluated in the post-operative period. The time of my evaluation may not match the time of this note. The patient denied uncontrolled pain or nausea, and there were no significant complications evident. Emani Valdes MD      Post anesthesia nausea and vomiting:  none  Final Post Anesthesia Temperature Assessment:  Normothermia (36.0-37.5 degrees C)      INITIAL Post-op Vital signs:   Vitals Value Taken Time   /47 11/29/21 1840   Temp 36.4 °C (97.6 °F) 11/29/21 1808   Pulse 79 11/29/21 1842   Resp 15 11/29/21 1842   SpO2 94 % 11/29/21 1842   Vitals shown include unvalidated device data.

## 2021-11-30 ENCOUNTER — APPOINTMENT (OUTPATIENT)
Dept: GENERAL RADIOLOGY | Age: 66
End: 2021-11-30
Attending: PHYSICIAN ASSISTANT
Payer: MEDICARE

## 2021-11-30 LAB
ANION GAP SERPL CALC-SCNC: 10 MMOL/L (ref 5–15)
BUN SERPL-MCNC: 17 MG/DL (ref 6–20)
BUN/CREAT SERPL: 20 (ref 12–20)
CALCIUM SERPL-MCNC: 8.1 MG/DL (ref 8.5–10.1)
CHLORIDE SERPL-SCNC: 110 MMOL/L (ref 97–108)
CO2 SERPL-SCNC: 20 MMOL/L (ref 21–32)
CREAT SERPL-MCNC: 0.85 MG/DL (ref 0.55–1.02)
GLUCOSE SERPL-MCNC: 135 MG/DL (ref 65–100)
HGB BLD-MCNC: 8.5 G/DL (ref 11.5–16)
INR PPP: 1.1 (ref 0.9–1.1)
POTASSIUM SERPL-SCNC: 4.2 MMOL/L (ref 3.5–5.1)
PROTHROMBIN TIME: 11 SEC (ref 9–11.1)
SODIUM SERPL-SCNC: 140 MMOL/L (ref 136–145)

## 2021-11-30 PROCEDURE — 85018 HEMOGLOBIN: CPT

## 2021-11-30 PROCEDURE — 97161 PT EVAL LOW COMPLEX 20 MIN: CPT

## 2021-11-30 PROCEDURE — 73030 X-RAY EXAM OF SHOULDER: CPT

## 2021-11-30 PROCEDURE — 2709999900 HC NON-CHARGEABLE SUPPLY

## 2021-11-30 PROCEDURE — 80048 BASIC METABOLIC PNL TOTAL CA: CPT

## 2021-11-30 PROCEDURE — 97530 THERAPEUTIC ACTIVITIES: CPT

## 2021-11-30 PROCEDURE — 85610 PROTHROMBIN TIME: CPT

## 2021-11-30 PROCEDURE — 74011000250 HC RX REV CODE- 250: Performed by: PHYSICIAN ASSISTANT

## 2021-11-30 PROCEDURE — 36415 COLL VENOUS BLD VENIPUNCTURE: CPT

## 2021-11-30 PROCEDURE — 74011250636 HC RX REV CODE- 250/636: Performed by: PHYSICIAN ASSISTANT

## 2021-11-30 PROCEDURE — G0378 HOSPITAL OBSERVATION PER HR: HCPCS

## 2021-11-30 PROCEDURE — 97165 OT EVAL LOW COMPLEX 30 MIN: CPT

## 2021-11-30 PROCEDURE — 97116 GAIT TRAINING THERAPY: CPT

## 2021-11-30 PROCEDURE — 97535 SELF CARE MNGMENT TRAINING: CPT

## 2021-11-30 PROCEDURE — 74011250637 HC RX REV CODE- 250/637: Performed by: PHYSICIAN ASSISTANT

## 2021-11-30 RX ORDER — HYDROCHLOROTHIAZIDE 25 MG/1
25 TABLET ORAL
Status: DISCONTINUED | OUTPATIENT
Start: 2021-11-30 | End: 2021-12-01 | Stop reason: HOSPADM

## 2021-11-30 RX ORDER — LISINOPRIL 20 MG/1
40 TABLET ORAL DAILY
Status: DISCONTINUED | OUTPATIENT
Start: 2021-11-30 | End: 2021-11-30

## 2021-11-30 RX ORDER — HYDROCHLOROTHIAZIDE 25 MG/1
25 TABLET ORAL DAILY
Status: DISCONTINUED | OUTPATIENT
Start: 2021-11-30 | End: 2021-11-30

## 2021-11-30 RX ORDER — LISINOPRIL 20 MG/1
40 TABLET ORAL
Status: DISCONTINUED | OUTPATIENT
Start: 2021-11-30 | End: 2021-12-01 | Stop reason: HOSPADM

## 2021-11-30 RX ADMIN — ACETAMINOPHEN 650 MG: 325 TABLET ORAL at 11:42

## 2021-11-30 RX ADMIN — ENOXAPARIN SODIUM 30 MG: 100 INJECTION SUBCUTANEOUS at 16:25

## 2021-11-30 RX ADMIN — WARFARIN SODIUM 9 MG: 2 TABLET ORAL at 18:01

## 2021-11-30 RX ADMIN — SODIUM CHLORIDE 125 ML/HR: 9 INJECTION, SOLUTION INTRAVENOUS at 03:25

## 2021-11-30 RX ADMIN — Medication 10 ML: at 01:58

## 2021-11-30 RX ADMIN — CELECOXIB 200 MG: 100 CAPSULE ORAL at 18:00

## 2021-11-30 RX ADMIN — ATORVASTATIN CALCIUM 20 MG: 20 TABLET, FILM COATED ORAL at 22:26

## 2021-11-30 RX ADMIN — WATER 2 G: 1 INJECTION INTRAMUSCULAR; INTRAVENOUS; SUBCUTANEOUS at 06:51

## 2021-11-30 RX ADMIN — ENOXAPARIN SODIUM 30 MG: 100 INJECTION SUBCUTANEOUS at 01:59

## 2021-11-30 RX ADMIN — Medication 10 ML: at 22:26

## 2021-11-30 RX ADMIN — Medication 5000 UNITS: at 01:58

## 2021-11-30 RX ADMIN — CELECOXIB 200 MG: 100 CAPSULE ORAL at 10:09

## 2021-11-30 RX ADMIN — Medication 10 ML: at 13:55

## 2021-11-30 RX ADMIN — ACETAMINOPHEN 650 MG: 325 TABLET ORAL at 01:58

## 2021-11-30 RX ADMIN — WARFARIN SODIUM 6 MG: 5 TABLET ORAL at 01:58

## 2021-11-30 RX ADMIN — Medication 10 ML: at 06:51

## 2021-11-30 RX ADMIN — WATER 2 G: 1 INJECTION INTRAMUSCULAR; INTRAVENOUS; SUBCUTANEOUS at 13:54

## 2021-11-30 RX ADMIN — ACETAMINOPHEN 650 MG: 325 TABLET ORAL at 06:52

## 2021-11-30 RX ADMIN — PHENOBARBITAL 129.6 MG: 32.4 TABLET ORAL at 20:31

## 2021-11-30 RX ADMIN — ACETAMINOPHEN 650 MG: 325 TABLET ORAL at 18:01

## 2021-11-30 RX ADMIN — PHENOBARBITAL 129.6 MG: 32.4 TABLET ORAL at 02:36

## 2021-11-30 RX ADMIN — ATORVASTATIN CALCIUM 20 MG: 20 TABLET, FILM COATED ORAL at 01:58

## 2021-11-30 RX ADMIN — PREGABALIN 75 MG: 75 CAPSULE ORAL at 22:26

## 2021-11-30 RX ADMIN — SODIUM CHLORIDE 125 ML/HR: 9 INJECTION, SOLUTION INTRAVENOUS at 14:07

## 2021-11-30 RX ADMIN — PREGABALIN 75 MG: 75 CAPSULE ORAL at 01:58

## 2021-11-30 RX ADMIN — FAMOTIDINE 20 MG: 20 TABLET ORAL at 10:09

## 2021-11-30 RX ADMIN — METOPROLOL TARTRATE 100 MG: 50 TABLET, FILM COATED ORAL at 10:09

## 2021-11-30 RX ADMIN — HYDROCHLOROTHIAZIDE 25 MG: 25 TABLET ORAL at 20:31

## 2021-11-30 RX ADMIN — FAMOTIDINE 20 MG: 20 TABLET ORAL at 18:01

## 2021-11-30 RX ADMIN — LISINOPRIL 40 MG: 20 TABLET ORAL at 20:31

## 2021-11-30 NOTE — PROGRESS NOTES
Occupational Therapy:  11/30/21    Orders received, chart reviewed and patient evaluated by occupational therapy. Pending progression with skilled acute occupational therapy, recommend:  No skilled occupational therapy/ follow up rehabilitation needs identified at this time. 11/30/21 0950 11/30/21 0957   BP: 113/62 129/62   BP Patient Position: At rest Sitting   Pulse: 91 101   SpO2:  95%      Recommend with nursing ADLs with supervision/setup, OOB to chair 3x/day and toileting via functional mobility to and from bathroom with 1 assist and walker. Thank you for completing as able in order to maintain patient strength, endurance and independence. Full evaluation to follow.      Thank you,  Krystyna Zavala, OTR/L
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
## 2021-11-30 NOTE — FACE TO FACE
Rounded on patient, f/u from Joint Pre-op Patient Education Class. Patient states class information was valuable in preparing for surgery. Patient states their home space is prepared and safe for recovery. Reviewed ice application, exercises and incentive spirometry use. IS provided out of patients bag, ice packs replaced  Questions answered. Leader Rounding with patient   Patient is alert and oriented x4 and in no acute distress at this time  Validated patients white board information   Care has been excellent  Food has been ok , has not received breakfast yet, order placed  Opportunity provided for patient to ask questions and to provided comments and concerns.

## 2021-11-30 NOTE — PROGRESS NOTES
Patient was not observed to have any seizure activity during night shift. Nursing continued to round and monitor for patient safety and comfort. Family member at bedside and fall prevention safety measures in place.

## 2021-11-30 NOTE — PROGRESS NOTES
Problem: Mobility Impaired (Adult and Pediatric)  Goal: *Acute Goals and Plan of Care (Insert Text)  Description: FUNCTIONAL STATUS PRIOR TO ADMISSION: Patient was modified independent using a rolling walker and wheelchair for functional mobility. HOME SUPPORT PRIOR TO ADMISSION: The patient lived with  but did not require assist.    Physical Therapy Goals  Initiated 11/30/2021    1. Patient will move from supine to sit and sit to supine  in bed with modified independence within 4 days. 2. Patient will perform sit to stand with modified independence within 4 days. 3. Patient will ambulate with modified independence for 75 feet with the least restrictive device within 4 days. 5. Patient will perform home exercise program per protocol with independence within 4 days. 6. Patient will demonstrate AROM 0-90 degrees in operative joint within 4 days. 11/30/2021 1442 by Author Isabella  Outcome: Progressing Towards Goal    PHYSICAL THERAPY TREATMENT  Patient: Karin Neely (83 y.o. female)  Date: 11/30/2021  Diagnosis: Closed fracture of right hip, initial encounter ([facility] [address] [S72.001A]  Primary osteoarthritis of right knee [M17.11]  Retained orthopedic hardware [Z96.9]   <principal problem not specified>  Procedure(s) (LRB):  REMOVAL OF HARDWARE AND RIGHT DISTAL FEMORAL REPLACEMENT (MAKOPLASTY) (GENERAL WITH FEMORAL NERVE BLOCK AND SCIATIC NERVE BLOCK) (Right) 1 Day Post-Op  Precautions:  WBAT RLE  Chart, physical therapy assessment, plan of care and goals were reviewed. ASSESSMENT  Patient continues with skilled PT services and is progressing towards goals. Pt greeted lying supine in bed, agreeable to work with therapy. Pt transferred from supine to WOB with use of gait belt with hook end with SBA. Once seated at EOB, she then stood with SBA and ambulated 50ft with RW with CGA with increased WBing through RLE tolerated as gait distance progressed.  Pt left sitting up in recline with all needs in reach. Encouraged to ambulate to and from restroom with RN assist for all toileting needs. Anticipate that pt will clear acute PT in 1 additional PT visit. .     Current Level of Function Impacting Discharge (mobility/balance): SBA/CGA    Other factors to consider for discharge:          PLAN :  Patient continues to benefit from skilled intervention to address the above impairments. Continue treatment per established plan of care. to address goals. Recommendation for discharge: (in order for the patient to meet his/her long term goals)  Physical therapy at least 2 days/week in the home AND ensure assist and/or supervision for safety with mobility    This discharge recommendation:  Has been made in collaboration with the attending provider and/or case management    IF patient discharges home will need the following DME: rolling walker       SUBJECTIVE:   Patient stated I'm a bit sore now.     OBJECTIVE DATA SUMMARY:   Critical Behavior:  Neurologic State: Alert  Orientation Level: Oriented X4  Cognition: Appropriate decision making, Appropriate for age attention/concentration, Appropriate safety awareness, Follows commands  Safety/Judgement: Awareness of environment, Fall prevention, Good awareness of safety precautions, Home safety, Insight into deficits  Functional Mobility Training:  Bed Mobility:     Supine to Sit: Contact guard assistance;  Additional time; Bed Modified     Scooting: Supervision        Transfers:  Sit to Stand: Contact guard assistance  Stand to Sit: Contact guard assistance                             Balance:  Sitting: Intact  Standing: Impaired  Standing - Static: Constant support; Good  Standing - Dynamic : Constant support; Good  Ambulation/Gait Training:  Distance (ft): 50 Feet (ft)  Assistive Device: Walker, rolling; Gait belt  Ambulation - Level of Assistance: Contact guard assistance     Gait Description (WDL): Exceptions to WDL  Gait Abnormalities: Antalgic; Decreased step clearance  Right Side Weight Bearing: As tolerated     Base of Support: Shift to left          Pain Ratin/10 R knee pain    Activity Tolerance:   Good    After treatment patient left in no apparent distress:   Sitting in chair and Call bell within reach    COMMUNICATION/COLLABORATION:   The patients plan of care was discussed with: Occupational therapist and Registered nurse.      Nisha Garza PT, DPT   Time Calculation: 28 mins

## 2021-11-30 NOTE — PROGRESS NOTES
11/30/2021  4:15 PM  Care Management Assessment      Reason for Admission: Elective - Surgery required      ICD-10-CM ICD-9-CM    1. Primary osteoarthritis of right knee  M17.11 715.16 oxyCODONE IR (ROXICODONE) 5 mg immediate release tablet      traMADoL (ULTRAM) 50 mg tablet   2. Retained orthopedic hardware  Z96.9 V45.89 oxyCODONE IR (ROXICODONE) 5 mg immediate release tablet      traMADoL (ULTRAM) 50 mg tablet       Assessment:   [x]In person with pt   []Via p/c with pt   []With family member in person. Who/Relation:     []With family member via p/c. Who/Relation:   []Chart Review    RUR:  NA - OBS  Risk Level: [x]Low []Moderate []High  Value-based purchasing: [] Yes [x] No  Bundle patient: [] Yes [x] No   Specify:     Advance Directive: Full Code. [x] No AD on file. [] AD on file. [] Current AD not on file. Copy requested. [] Requests AD, and referral submitted to [facility]. Healthcare Decision Maker:  Edmundo Moe -         Assessment:    Age: 78    Sex: [] Male [x]Female     Residency: [x]Private residence []Apartment []Assisted Living []LTC []Other:   Exterior Steps: ramp  Interior Steps: 0    Lives With: [x]With spouse []Other family members []Underage children []Alone []Care provider []Other:    Prior functioning:  [x]Independent with ADLs and iADLS []Dependent with ADLs and iADLs []Partial dependence, Specify:     Prior DME required:  []None []RW []Cane []Crutches []Bedside commode []CPAP []Home O2 (Liter/Provider: ) []Nebulizer   []Shower Chair [x]Wheelchair []Hospital Bed []Anna []Stair lift []Rollator [x]Other: Hemiwalker      Rehab history: []None []Outpatient PT [x]Home Health (Provider/Date: At [address] / 05/2021) []SNF (Provider/Date: ) []IPR (Provider/Date: ) []LTC (Provider/Date: ) []Hospice (Provider/Date: )  []Other:     Discharge Concerns: []Yes [x]No []Unknown   Describe:    Comments:      Insurer:   [address] [place] [address] [facility] Phone: [phone]: Anjum Garcia Subscriber#: [id number]    Group#: [id number] Precert#: --      Observation notice provided in writing to patient and/or caregiver as well as verbal explanation of the policy. Patients who are in outpatient status also receive the Observation notice. Patient has received notice and or patient representative has received via secure email, fax, or certified mail based on patient representative's preference. PCP: Alvarado Fernando   Address: [address] [place] [address]  / [place] [address] [address]   Phone number: [phone]   Current patient: [x]Yes []No   Approximate date of last visit: 10/21   Access to virtual PCP visits: []Yes [x]No    Pharm[facility]:  [facility] [address] Transport: Family       Transition of care plan:    []Unable to determine at this time. Awaiting clinical progress, and disposition recommendations. [] Home with outpatient follow-up    [] Home with Outpatient PT and outpatient follow-up   Pt aware of OP appt? []Yes, Provider:   []Not scheduled   Transport provider:     [] Home with family assistance as needed and outpatient follow-up   Family able to assist:    Schedule:  Transport provider:      [x] Home with Home Health   - Provider:      []SNF/IPR   -[]Preferences given:   []Listing provided and preferences requested   -Status: []Pending []Accepted:    -Auth required: []Yes []No    -Auth initiated date:   -3 midnight stay required: []Yes []No  Date satisfied:     [] Home with Hospice   -Provider:     [] [facility] information provided. [] Other:     Lay Cardona MA    Care Management Interventions  PCP Verified by CM:  Yes Clara Cockayne)  Mode of Transport at Discharge: Self  Transition of Care Consult (CM Consult): Discharge Planning  MyChart Signup: No (RW)  Discharge Durable Medical Equipment: Yes  Physical Therapy Consult: Yes  Occupational Therapy Consult: Yes  Speech Therapy Consult: No  Support Systems: Spouse/Significant Other  The Plan for Transition of Care is Related to the Following Treatment Goals : Ortho  The Patient and/or Patient Representative was Provided with a Choice of Provider and Agrees with the Discharge Plan?: (S) Yes  Name of the Patient Representative Who was Provided with a Choice of Provider and Agrees with the Discharge Plan: Self  Freedom of Choice List was Provided with Basic Dialogue that Supports the Patient's Individualized Plan of Care/Goals, Treatment Preferences and Shares the Quality Data Associated with the Providers?: (S) Yes  Discharge Location  Discharge Placement: Home with home health

## 2021-11-30 NOTE — OP NOTES
OPERATIVE REPORT     Admit Date: 11/29/2021  Admit Diagnosis: Closed fracture of right knee - non-union, initial encounter ([facility] [address] [S72.001A]  Retained orthopedic hardware [Z96.9]    Date of Procedure: 11/29/2021   Preoperative Diagnosis: Closed fracture of right distal femur, non-union, initial encounter ([facility] [address] [S72.001A]  Postoperative Diagnosis:  Closed fracture of right distal femur[facility]union[facility] [S72.[facility]]  Procedure: Procedure(s):  REMOVAL OF HARDWARE AND RIGHT DISTAL FEMORAL REPLACEMENT (MAKOPLASTY) (GENERAL WITH FEMORAL NERVE BLOCK AND SCIATIC NERVE BLOCK)  Surgeon: Robert Odom MD  Assistant(s): Sarthak Longo PA-C  Anesthesia: General   Estimated Blood Loss: 700cc  Specimens: * No specimens in log *   Complications: None          INDICATIONS:     The patient is a 78 y.o., female who has a non-union of right distal femoral fracture. The patient underwent the appropriate preoperative labs and was indicated for surgery. Informed consent obtained including a discussion of the risks and benefits, which include, but are not limited to, bleeding, infection, neurovascular damage, wound complications, pain and stiffness in the knee, periprosthetic loosening, fracture dislocation and DVT, the patient consented for the procedure. DESCRIPTION OF PROCEDURE:     The proper limb was identified and signed in the preoperative holding area. All questions were answered. After adequate anesthesia, the right lower extremity was prepped and draped in sterile fashion. A standard mid-line para-patellar exposure was used. The plate and previous fracture site was then exposed. The plate was exposed proximally and the proximal most screws were removed. A transverse bony cut approximately 15cm from the joint line was made. The distal femur was removed en-bloc along with the plate. Care was taken to protect neurovascular structures. The bovie was used to establish the correct femoral rotation.  The femur was prepared after sequential reaming. The appropriate facing reamer for a line to line fit was used. Next the proximal tibia was exposed. Soft tissue was removed to include the medial and lateral meniscus. The free tibial guide was used to make the final tibial cut with the appropriate valgus and slope. The tibia was prepared including baseplate sizing, boss reaming and keel placement. Tibial reaming was also performed. A trial for the tibia was assembled and placed. The femoral trial was assembled and the appropriate polyethylene was selected. The hinged component trial demonstarted good patellar tracking and ROM of 0 to 80 degrees. A thinner polyethylene was used initially with marked knee hyper-extension. A thicker poly ethylene was placed and there was some impingement of the distal pole of the patella, stable ligamentous tension and good overall patellar tracking, and no hyper-extension of the knee. Final components were selected and assembled. All bony surfaces were washed and the cement was applied first to the tibia and tibial canal. This implant was placed and pressure applied until the cement hardened. A separate cement batch was prepared for the femur and the component cemented in place once the tibia had hardened. Care was taken to establish appropriate tibial and femoral rotation. The polyethylene was impacted in place. Cemented implants were placed in a staged fashion including tibial placement, and second stage of cement mixing for the femoral placement. The bushings for the femur were placed. The rotating hinged for the tibia was placed and the femur was reduced to the tibia. The hinged was placed and the locking mechanism was placed. The knee was brought out into full extension. Cement was removed and the knee ranged to verify alignment and tracking. The capsulotomy was closed w/ 0-vicryl suture and #2 Q-will suture.  The sub-cutaneous tissue was closed with a 2-0 Vicryl and staples. A sterile dressing was applied. The patient was awoken from anesthesia and taken to the recovery room in a stable condiition. OPERATIVE FINDINGS : Comminuted distal femur fracture non-union. IMPLANTS :   Implant Name Type Inv.  Item Serial No.  Lot No. LRB No. Used Action   [employer] Bone Preparation kit-small and medium cement restrictor   NA [employer] [id number] Right 1 Implanted   simplex p speedset bone cement   [employer] [id number] Right 5 Implanted   PLATE BNE [id number] 10 H NONSTERILE R CNDYL S STL CRV VIRAL - SN/A  PLATE BNE [id number] 10 H NONSTERILE R CNDYL S STL CRV VIRAL N/A [employer] [place]_WD N/A Right 1 Explanted   SCR BNE CRTX ST 4.5X36MM SS --  - SN/A  SCR BNE CRTX ST 4.5X36MM SS --  N/A SYNTHES [place] N/A Right 2 Explanted   SCR BNE CRTX ST 4.5X32MM SS --  - SN/A  SCR BNE CRTX ST 4.5X32MM SS --  N/A SYNTHES [place] N/A Right 3 Explanted   SCR BNE CRTX ST 4.5X34MM SS --  - SN/A  SCR BNE CRTX ST 4.5X34MM SS --  N/A SYNTHES [place] N/A Right 2 Explanted   SCREW BNE L35MM DIA5MM CNDYL S STL ST MARCIN ANG EUGENIO VIRAL FULL - SN/A  SCREW BNE L35MM DIA5MM CNDYL S STL ST MARCIN ANG EUGENIO VIRAL FULL N/A [employer] [place] N/A Right 1 Explanted   SCREW BNE L55MM DIA5MM CNDYL S STL ST MARCIN ANG VIRAL FULL THRD - SN/A  SCREW BNE L55MM DIA5MM CNDYL S STL ST MARCIN ANG VIRAL FULL THRD N/A [employer] [place] N/A Right 1 Explanted   SCREW BNE L70MM DIA5MM CNDYL S STL ST MARCIN ANG VIRAL FULL THRD - SN/A  SCREW BNE L70MM DIA5MM CNDYL S STL ST MARCIN ANG VIRAL FULL THRD N/A [employer] [place] N/A Right 1 Explanted   SCREW BNE L80MM DIA5MM CNDYL S STL ST MARCIN ANG EUGENIO VIRAL FULL - SN/A  SCREW BNE L80MM DIA5MM CNDYL S STL ST MARCIN ANG EUGENIO VIRAL FULL N/A [employer] [place] N/A Right 3 Explanted   EXTENSION STEM L80MM [id number] STD UNIV VIT KNEE TIP PRESSFIT - SNA  EXTENSION STEM L80MM [id number] STD UNIV VIT KNEE TIP PRESSFIT NA [employer] HOW_WD [id number] Right 1 Implanted   BASEPLATE TIB SM SZ 2 KNEE MOD ROT HNG KEEL GMRS - SNA  BASEPLATE TIB SM SZ 2 KNEE MOD ROT HNG KEEL GMRS NA [employer] [facility] [id number] Right 1 Implanted   COMPONENT FEM L65MM STD R DST HIP GMRS - SNA  COMPONENT FEM L65MM STD R DST HIP GMRS NA [employer] [facility] [id number] Right 1 Implanted   STEM FEM L102MM DIA9MM KNEE TI W/O POR BODY STR MECHE GMRS - SNA  STEM FEM L102MM DIA9MM KNEE TI W/O POR BODY STR MECHE GMRS NA [employer] [facility] [id number] Right 1 Implanted   EXTENSION STEM L100MM DST FEM MOD PC GMRS - SNA  EXTENSION STEM L100MM DST FEM MOD PC GMRS NA [employer] [facility] [id number] Right 1 Implanted   SLEEVE TIB STD UNIV POLYETH MOD MECHE STEM ROT HNG REV - SNA  SLEEVE TIB STD UNIV POLYETH MOD MECHE STEM ROT HNG REV NA [employer] [facility] [id number] Right 1 Implanted   Triathlon x3 Symmetric Patella   NA [employer] 2THH Right 1 Implanted   AXLE TIB PROX KNEE MOD ROT HNG MONOGRAM - SNA  AXLE TIB PROX KNEE MOD ROT HNG MONOGRAM [employer] [facility] [id number] Right 1 Implanted   INSERT TIB 3DEG DST KNEE POLY BUMPER MOD ROT HNG GMRS - SNA  INSERT TIB 3DEG DST KNEE POLY BUMPER MOD ROT HNG GMRS NA [employer] [facility] [id number] Right 1 Implanted   BUSHING FEM UNIV KNEE PLOY MOD MECHE STEM ROT HNG REV - SNA  BUSHING FEM UNIV KNEE PLOY MOD MECHE STEM ROT HNG REV NA [employer] [facility] [id number] Right 1 Implanted   BUSHING FEM UNIV KNEE PLOY MOD MECHE STEM ROT HNG REV - SNA  BUSHING FEM UNIV KNEE PLOY MOD MECHE STEM ROT HNG REV NA [employer] [facility] [id number] Right 1 Implanted   COMPONENT TIB XSM-XL KNEE POLYETH MOD MECHE STEM ROT HNG REV - SNA  COMPONENT TIB XSM-XL KNEE POLYETH MOD MECHE STEM ROT HNG REV NA [employer] [facility] [id number] Right 1 Implanted   INSERT TIB SM SZ 1/2 [id number] MOD ROT HNG MONOGRAM - SNA  INSERT TIB SM SZ 1/2 [id number] MOD ROT HNG MONOGRAM NA [employer] HOWM_WD [id number] Right 1 Implanted       POST OPERATIVE CONSIDERATIONS :   WBAT w/ walker.     JUSTIFICATION FOR SURGICAL ASSISTANT:   Surgical Assistant, was requried and necessary in this case, to help with soft tissue retraction, extremity positioning, equiment management, implant management, and wound closure.     Josephine Hussein MD

## 2021-11-30 NOTE — PERIOP NOTES
TRANSFER - OUT REPORT:    Verbal report given to [facility] (name) on Urmila Screen  being transferred to   (unit) for routine post - op       Report consisted of patients Situation, Background, Assessment and   Recommendations(SBAR). Information from the following report(s) SBAR, Kardex, MAR, Recent Results and Cardiac Rhythm NSR was reviewed with the receiving nurse. Lines:   Peripheral IV 11/29/21 Left; Posterior Hand (Active)   Site Assessment Clean, dry, & intact 11/29/21 1805   Phlebitis Assessment 0 11/29/21 1805   Infiltration Assessment 0 11/29/21 1805   Dressing Status Clean, dry, & intact 11/29/21 1805   Dressing Type Transparent 11/29/21 1805   Hub Color/Line Status Pink; Infusing; Patent 11/29/21 1805   Action Taken Open ports on tubing capped 11/29/21 1805   Alcohol Cap Used Yes 11/29/21 1805        Opportunity for questions and clarification was provided.       Patient transported with:   O2 @ 2 liters  Registered Nurse

## 2021-11-30 NOTE — PROGRESS NOTES
TOTAL KNEE ARTHROPLASTY DAILY NOTE     ASSESSMENT / PLAN :   1. Pain Control : Excellent - Able to sleep and participate with therapy  2. Overnight Issues : none reported  3. Wound or incisional issue : Healing incision with no visible drainage  4. Therapy / Weight Bearing Recommendations : Weight bear as tolerated with use of a walker and two person assist while mobilizing  5. DVT Prophylaxis : Mechanical and Aspirin and mechanical lower extremity compression device  6. Disposition : Home - outpatient PT  7. Medical Concerns : deconditioning  8. Comments : Discharge home once stable and able to get around safely per PT     POD  1 Day Post-Op s/p Procedure(s):  REMOVAL OF HARDWARE AND RIGHT DISTAL FEMORAL REPLACEMENT (MAKOPLASTY) (GENERAL WITH FEMORAL NERVE BLOCK AND SCIATIC NERVE BLOCK)     SUBJECTIVE :     Overnight complaints : none. OBJECTIVE :     Vitals:    11/29/21 2243 11/29/21 2357 11/30/21 0047 11/30/21 0347   BP: 128/76 109/66 100/64 (!) 104/45   Pulse: 88 92 89 83   Resp: 16 16 16 16   Temp: 98 °F (36.7 °C) 98 °F (36.7 °C) 97.3 °F (36.3 °C) 98 °F (36.7 °C)   SpO2: 96% 94% 94% 94%   Weight:       Height:           Alert and oriented x3. Examination of the right knee reveals that the dressing is clean, dry and intact. Motor Exam : Pt is not able to perform a straight leg raise. Sensation is intact to light touch. No calf pain. MEDS / LABS :   Lovenox 30 mg BID and pharmacy to dose coumadin until INR therapeutic.     Labs:  Recent Labs     11/30/21  0434 11/30/21  0156   HGB 8.5*  --    INR  --  1.1   NA  --  140   K  --  4.2   CL  --  110*   CO2  --  20*   BUN  --  17   CREA  --  0.85   GLU  --  135*      Patient mobility           Richard Adkins MD  Cell [phone]  Janet Son PA-C  Cell [phone]  Medical Assistants: Osman Zeng [phone]                 Surgery Scheduler: Stella Edmondson, [address] [phone] ext [phone]

## 2021-11-30 NOTE — PROGRESS NOTES
Problem: Mobility Impaired (Adult and Pediatric)  Goal: *Acute Goals and Plan of Care (Insert Text)  Description: FUNCTIONAL STATUS PRIOR TO ADMISSION: Patient was modified independent using a rolling walker and wheelchair for functional mobility. HOME SUPPORT PRIOR TO ADMISSION: The patient lived with  but did not require assist.    Physical Therapy Goals  Initiated 11/30/2021    1. Patient will move from supine to sit and sit to supine  in bed with modified independence within 4 days. 2. Patient will perform sit to stand with modified independence within 4 days. 3. Patient will ambulate with modified independence for 75 feet with the least restrictive device within 4 days. 5. Patient will perform home exercise program per protocol with independence within 4 days. 6. Patient will demonstrate AROM 0-90 degrees in operative joint within 4 days. Outcome: Progressing Towards Goal     PHYSICAL THERAPY EVALUATION  Patient: Verona Mendoza (22 y.o. female)  Date: 11/30/2021  Primary Diagnosis: Closed fracture of right hip, initial encounter ([address] [S72.001A]  Primary osteoarthritis of right knee [M17.11]  Retained orthopedic hardware [Z96.9]  Procedure(s) (LRB):  REMOVAL OF HARDWARE AND RIGHT DISTAL FEMORAL REPLACEMENT (MAKOPLASTY) (GENERAL WITH FEMORAL NERVE BLOCK AND SCIATIC NERVE BLOCK) (Right) 1 Day Post-Op   Precautions: WBAT RLE       ASSESSMENT  Based on the objective data described below, the patient presents with decreased activity tolerance and generalized weakness now POD 1 s/p R TKA with removal of femur hardware. Pt with femur fx and R shoulder fx in May of this year and underwent ORIF of R femur with poor healing alignment. She has been TTWB on RLE for the last 6 months. Today, pt denied pain throughout session. She is current requiring Min A for bed mobility and CGA for transfers and ambulation with RW.  Overall, pt is mobilizing well and expect she will clear acute PT in 1-2 additional PT visits. She has ramp access into home so will not need to clear stairs prior to dc. Acute PT will continue to follow. Rec HH vs OP PT per MD recommendations. .    Current Level of Function Impacting Discharge (mobility/balance): Min A bed mobility, CGA transfers and gait with RW     Functional Outcome Measure: The patient scored 55/100 on the Barthel Index outcome measure which is indicative of 45% functional impairment. Other factors to consider for discharge: =     Patient will benefit from skilled therapy intervention to address the above noted impairments. PLAN :  Recommendations and Planned Interventions: bed mobility training, transfer training, gait training, therapeutic exercises, patient and family training/education, and therapeutic activities      Frequency/Duration: Patient will be followed by physical therapy:  twice daily to address goals. Recommendation for discharge: (in order for the patient to meet his/her long term goals)  To be determined: per MD recommendation     This discharge recommendation:  Has been made in collaboration with the attending provider and/or case management    IF patient discharges home will need the following DME: rolling walker         SUBJECTIVE:   Patient stated It feels so weird to have my foot flat on the ground.     OBJECTIVE DATA SUMMARY:   HISTORY:    Past Medical History:   Diagnosis Date    A-fib ([facility]     Anticoagulated on warfarin     Followed by Dr. Katelyn Ocasio    GERD (gastroesophageal reflux disease)     History of femur fracture 2021    Along with shoulder fracture    Hx of seasonal allergies     Hyperlipidemia     Hypertension     Seizures ([facility]      Past Surgical History:   Procedure Laterality Date    HX ANKLE FRACTURE TX Right     HX  SECTION      x 2    HX FEMUR FRACTURE [address] Right 2021    IR INJ SINUS TRACT THERAP         Personal factors and/or comorbidities impacting plan of care:     [address] [place] [address] Environment: Private residence  # Steps to Enter: 0  Wheelchair Ramp: Yes  One/Two Story Residence: One story  Living Alone: No  Support Systems: Spouse/Significant Other  Patient Expects to be Discharged to[de-identified] [employer]  Current DME Used/Available at [facility]: Wheelchair, Shower chair, Raised toilet seat (pia walker)  Tub or Shower Type: Tub/Shower combination    EXAMINATION/PRESENTATION/DECISION MAKING:   Critical Behavior:              Hearing:     Skin:  Defer to RN notes  Edema: Defer to RN notes  Range Of Motion:   Generally decreased, functional                       Strength:      Generally decreased, functional                    Tone & Sensation:    WNL           Functional Mobility:  Bed Mobility:     Supine to Sit: Minimum assistance     Scooting: Supervision  Transfers:  Sit to Stand: Contact guard assistance  Stand to Sit: Contact guard assistance                       Balance:   Sitting: Intact  Standing: Impaired  Standing - Static: Good; Constant support  Standing - Dynamic : Good; Constant support  Ambulation/Gait Training:  Distance (ft): 18 Feet (ft) (x 2 reps)  Assistive Device: Walker, rolling; Gait belt  Ambulation - Level of Assistance: Contact guard assistance     Gait Description (WDL): Exceptions to WDL  Gait Abnormalities: Antalgic; Decreased step clearance  Right Side Weight Bearing: As tolerated     Base of Support: Shift to left          Functional Measure:  Barthel Index:    Bathin  Bladder: 5  Bowels: 10  Groomin  Dressin  Feeding: 10  Mobility: 0  Stairs: 0  Toilet Use: 10  Transfer (Bed to Chair and Back): 10  Total: 55/100       The Barthel ADL Index: Guidelines  1. The index should be used as a record of what a patient does, not as a record of what a patient could do. 2. The main aim is to establish degree of independence from any help, physical or verbal, however minor and for whatever reason. 3. The need for supervision renders the patient not independent.   4. A patient's performance should be established using the best available evidence. Asking the patient, friends/relatives and nurses are the usual sources, but direct observation and common sense are also important. However direct testing is not needed. 5. Usually the patient's performance over the preceding 24-48 hours is important, but occasionally longer periods will be relevant. 6. Middle categories imply that the patient supplies over 50 per cent of the effort. 7. Use of aids to be independent is allowed. Score Interpretation (from [facility])    Independent   60-79 Minimally independent   40-59 Partially dependent   20-39 Very dependent   <20 Totally dependent     -Bina Goddard., Barthel, D.W. (1965). Functional evaluation: the Barthel Index. [address] [facility] [address] (1997). The Barthel activities of daily living index: self-reporting versus actual performance in the old (> or = 75 years). Journal of [address] [place] [address] 45(2), [facility], SCARLETT, Lyn Garcia., Mukul Underwood. (1999). Measuring the change in disability after inpatient rehabilitation; comparison of the responsiveness of the Barthel Index and Functional [place] Measure. Journal of Neurology, Neurosurgery, and Psychiatry, 66(4), 774-697. Alverto Cabrera, N.J.A, YOKASTA Perez, & Chacha Gunn M.A. (2004) Assessment of post-stroke quality of life in cost-effectiveness studies: The usefulness of the Barthel Index and the EuroQoL-5D.  Quality of Life Research, 15, 574-35           Physical Therapy Evaluation Charge Determination   History Examination Presentation Decision-Making   MEDIUM  Complexity : 1-2 comorbidities / personal factors will impact the outcome/ POC  LOW Complexity : 1-2 Standardized tests and measures addressing body structure, function, activity limitation and / or participation in recreation  LOW Complexity : Stable, uncomplicated  LOW Complexity : FOTO score of       Based on the above components, the patient evaluation is determined to be of the following complexity level: LOW     Pain Rating:  Denied pain    Activity Tolerance:   Good    After treatment patient left in no apparent distress:   Sitting in chair and Call bell within reach    COMMUNICATION/EDUCATION:   The patients plan of care was discussed with: Occupational therapist and Registered nurse. Fall prevention education was provided and the patient/caregiver indicated understanding., Patient/family have participated as able in goal setting and plan of care. , and Patient/family agree to work toward stated goals and plan of care.     Thank you for this referral.  Sharan Benjamin PT, DPT   Time Calculation: 25 mins

## 2021-11-30 NOTE — PROGRESS NOTES
[facility] Dosing Services: 11/30/2021    Consult for Warfarin Dosing by Pharmacy by Jesika SAVAGE  Consult provided for this 78 y.o.  female , for indication of Atrial Fibrillation. Resumed Day 2     Dose to achieve an INR goal of 2-3  Current home dose 6 mg Monday and Friday 9 mg Ray Pickup entered for  Warfarin  9 (mg) ordered to be given today at 18:00. Per consult lovenox 30 mg every 12 hours until INR reaches therapeutic range  Significant drug interactions: phenobarbital (home med), celecoxib  Previous dose given 6 mg   PT/INR Lab Results   Component Value Date/Time    INR 1.1 11/30/2021 01:56 AM      Platelets Lab Results   Component Value Date/Time    PLATELET 287 85/55/7430 02:14 PM      H/H Lab Results   Component Value Date/Time    HGB 8.5 (L) 11/30/2021 04:34 AM        Pharmacy to follow daily and will provide subsequent Warfarin dosing based on clinical status.   [facility])  Contact information [phone]

## 2021-12-01 VITALS
DIASTOLIC BLOOD PRESSURE: 62 MMHG | WEIGHT: 237.8 LBS | OXYGEN SATURATION: 94 % | RESPIRATION RATE: 18 BRPM | HEART RATE: 80 BPM | TEMPERATURE: 97.6 F | BODY MASS INDEX: 40.6 KG/M2 | HEIGHT: 64 IN | SYSTOLIC BLOOD PRESSURE: 109 MMHG

## 2021-12-01 LAB
INR PPP: 1.2 (ref 0.9–1.1)
PROTHROMBIN TIME: 12.4 SEC (ref 9–11.1)

## 2021-12-01 PROCEDURE — 96372 THER/PROPH/DIAG INJ SC/IM: CPT

## 2021-12-01 PROCEDURE — 74011250636 HC RX REV CODE- 250/636: Performed by: PHYSICIAN ASSISTANT

## 2021-12-01 PROCEDURE — 36415 COLL VENOUS BLD VENIPUNCTURE: CPT

## 2021-12-01 PROCEDURE — 74011250637 HC RX REV CODE- 250/637: Performed by: PHYSICIAN ASSISTANT

## 2021-12-01 PROCEDURE — 97535 SELF CARE MNGMENT TRAINING: CPT

## 2021-12-01 PROCEDURE — 85610 PROTHROMBIN TIME: CPT

## 2021-12-01 PROCEDURE — 97530 THERAPEUTIC ACTIVITIES: CPT

## 2021-12-01 PROCEDURE — 97116 GAIT TRAINING THERAPY: CPT

## 2021-12-01 PROCEDURE — G0378 HOSPITAL OBSERVATION PER HR: HCPCS

## 2021-12-01 RX ORDER — ENOXAPARIN SODIUM 100 MG/ML
30 INJECTION SUBCUTANEOUS EVERY 12 HOURS
Qty: 4.2 ML | Refills: 0 | Status: SHIPPED | OUTPATIENT
Start: 2021-12-01 | End: 2021-12-08

## 2021-12-01 RX ADMIN — ENOXAPARIN SODIUM 30 MG: 100 INJECTION SUBCUTANEOUS at 06:56

## 2021-12-01 RX ADMIN — FAMOTIDINE 20 MG: 20 TABLET ORAL at 10:23

## 2021-12-01 RX ADMIN — METOPROLOL TARTRATE 100 MG: 50 TABLET, FILM COATED ORAL at 10:23

## 2021-12-01 RX ADMIN — Medication 10 ML: at 06:57

## 2021-12-01 RX ADMIN — CELECOXIB 200 MG: 100 CAPSULE ORAL at 10:23

## 2021-12-01 RX ADMIN — ACETAMINOPHEN 650 MG: 325 TABLET ORAL at 06:56

## 2021-12-01 RX ADMIN — Medication 10 ML: at 16:17

## 2021-12-01 RX ADMIN — ACETAMINOPHEN 650 MG: 325 TABLET ORAL at 00:18

## 2021-12-01 RX ADMIN — ACETAMINOPHEN 650 MG: 325 TABLET ORAL at 12:47

## 2021-12-01 NOTE — DISCHARGE INSTRUCTIONS
TOTAL KNEE DISCHARGE INSTRUCTIONS      Patient: Yossi Barnes MRN: [id number]  SSN: [id number]              Please take the time to review the following instructions before you leave the hospital and use them as guidelines during your recovery from surgery. If you have any questions you may contact my office at [phone]  After business hours or during the weekend you can contact me through [address] or text / call at [phone] (cell phone) for emergency's. Please use the office number during regular business hours. SPECIAL INSTRUCTIONS :   1. Full extension at the knee is the most important aspect of your range of motion. Avoid placing a pillow or bump behind the knee. Rather, place the heel up on a bump or pillow and allow gravity to help straighten the knee. 2. You may weight bear as tolerated on the knee and during the day you should bend the knee as much as possible. 3. Drainage from the incision more than 4 days from surgery is concerning. Contact my office if there is any question [phone].   4. You may contact me directly through [employer] if there are specific questions or text / call using my cell number [phone]. DRESSING :     Post-op Dressings : This should be removed by physical therapy or you may remove this yourself 7 days after the date of your surgery. If there is no drainage, then a simple dressing may be used or no dressing at all. Other dressing options can be purchased over the counter at a local pharmacy or medical supply vendor. A porous adhesive dressing such as pictured above can be purchased online ([place]) or at your local [facility] or [employer]. You only need to keep the incision covered for 7 days after showers. A dressing may be used for longer if there are issues with clothing clinging to the incision. Showering/ Bathing: You may shower with the Post-op dressing in place.  This is left in place for 7 days following discharge from the hospital. If your incision is dry without drainage you may shower following your discharge home. After 7 days your dressing should be removed for showering. It is fine to have water run over the incision. Do not vigorously scrub your incision. Apply a clean, dry dressing after you have dried your incision. Do not take a bath or get into a swimming pool / [employer] until you follow up with Dr. Rito Jerome. Do not soak your incision under water. If there is continued drainage or you are concerned contact Dr Miller Daily office prior to showering [phone] ext [phone] . Diet:  You may advance to your regular diet as tolerated. Increase your clear liquid intake for the next 2-3 days. Medication:      1. You will be given prescriptions for pain medication when you are discharged from the hospital. The side effects of these medications can be substantial and the narcotic medications are not mandatory. You may substitute these medications with Tylenol or Alleve / Motrin. 2. Please use the medications as prescribed. Pain medications may cause constipation- Colace twice daily and Miralax one scoop daily while taking the narcotic medication should help prevent constipation. Please discuss with your local pharmacist regarding increasing this dosage if constipation persists. Other possible side effects of pain medication are dizziness, headache, nausea, vomiting, and urinary retention. Discontinue the pain medication if you develop itching, rash, shortness of breath, or difficulties swallowing. If these symptoms become severe or are not relieved by discontinuing the medication, you should seek immediate medical attention. 3. Refills of pain medication are authorized during office hours only (8 AM- 5 PM  Monday thru Friday). Many of these medication will require you or a family member to pick-up a physical prescription at the office.    4. Medications other than antiinflammatories will not be called into the pharmacy after business hours. 5. You may resume the medication(s) you were taking prior to your surgery. Narcotics may change the effects of some antidepressant medication(s). If you have any questions about possible interactions between your regular medications and the pain medication, you should ask the pharmacist or contact the prescribing physician. 6. If you have constipation which is not improved by oral stool softeners then a Ducolax suppository should be purchased over the counter. 7. Anticoagulation**: Resume your home dose Coumadin. You will be sent home with a 7 day prescription for Lovenox. Continue the Lovenox until your INR is checked by home health (Friday or Saturday). Contact Dr. Escobar Stover with your results for instructions as to whether to stop or continue the Lovenox**         Follow up appointment:    Please call our office at [phone] for your follow up appointment. This should be scheduled 14 days following the date of surgery. You should also have a scheduled appointment for physical therapy following surgery. Physical Therapy / Nursing:    Physical Therapy following surgery will be arranged as an outpatient or at home. They have specific instructions for rehab and wound care. It is fine to have physical therapy remove your dressing at 7 days following surgery. Returning to work:    Normal return to work is 6-12 weeks following surgery. Depending on your progression following surgery and specific job duties you may take longer for a full return to work. DRIVING    You should not return to driving until you are off all opioid pain medications and able to safely and quickly apply the brakes. This is normally 2-6 weeks for left sided surgery and 2-8 weeks for right sided surgery. Important Signs and Symptoms:    If any of the following signs or symptoms occur, you should contact Dr. Brooks Haines office.   Please be advised if a problem arises which you feel requires immediate medical attention or you are unable to contact Dr. Shannon Salazar office you should seek immediate medical attention at the ER or other health care facility you have access to.    1. A sudden increase in swelling and/or redness or warmth at the area your surgery was performed which isnt relieved by rest, ice, and elevation. 2. Oral temperature greater than 101 degrees for 12 hours or more which isnt relieved by an increase in fluid intake and taking 2 Tylenol every 4-6 hours. 3. Excessive drainage from your incisions, or drainage which hasnt stopped by 72 hours after your surgery. 4. Fever, chills, shortness of breath, chest pain, nausea, vomiting or other signs and symptoms which are of concern to you. YOUR TOTAL JOINT REPLACEMENT  FREQUENTLY ASKED QUESTIONS   What should I take for pain?  o You will be discharged with four medications for pain (Oxycodone, Tramadol, Ibuprofen and Tylenol). These may vary slightly depending on what you were taking in the hospital.   - 1st Line - Tylenol Arthritis Strength - 325-650 mg every 4 hours (scheduled for the 1st 24 hours)  - 2nd Line -  Ibuprofen 400 (2 tabs) - 800 (4 tabs) mg every 8 hours  (scheduled for the 1st 24 hours)  - 3rd Line - Oxycodone 5 mg (1-2 tablets every 4-6 hrs)  - 4th Line - Tramadol 50 mg (1-2 tablets every 4-6 hours) - take these between Oxycodone doses if your pain is not alleviated.  When should I call for advice regarding my pain?  o If your pain is still uncontrolled after being on the regimen above for at least 12 hours, please call the office [phone] or text / call my cell after hours [phone].  Can I get refills?  o Opioid refills are provided for the first 2-6 weeks following surgery. o Use Tylenol 500 mg along with Aleve 220mg twice daily or Motrin 200-800mg every 4-6 hours during the daytime hours after two weeks.   - After two weeks, I suggest the opioid pain medications be used only 1 hour prior to your physical therapy appointment and 1 hour before sleeping at night. Use Tylenol and Ibuprofen at other times during the day. - Keep in mind that you will need to discontinue opioids before you resume driving.  Is swelling normal?  o Almost everyone has some degree of swelling following surgery. o Following hip and knee replacement surgery, swelling can be normal below the incision for the first few weeks. - This swelling peaks around 5-7 days after surgery. - It is not unusual to have some bruising about the back of the thigh, calf, ankle, and foot.  What should I do for the swelling?  o Keep the limb elevated above the level of your heart - 'Toes above Nose'. o Apply compression socks (knee high for total knees and up to the mid-thigh for total hips). o Use the ice packs that you are discharged home with several times a day for the first several weeks.  How long should I remain on blood thinners following surgery? o 30 days   Which blood thinners will I be on? Can I take them with Tylenol?  o  Aspirin 81 mg twice daily - these should be taken with meals and can be used with Tylenol. In certain instances, you may be sent home on Lovenox for 30 days. o For short periods of time (30 days), aspirin and anti-inflammatories (i.e. Aleve, Motrin / Advil / ibuprofen, diclofenac, etc. can be taken together).  When can I drive?  o Once you have stopped using regular narcotic pain medications (Oxycodone, Percocet, Lortab, Norco etc.) and can safely apply the brakes without hesitation, (emergency braking).  When can I shower?  o You may shower immediately if your Optifoam bandage is dry and without discharge. The Optifoam dressing should be removed 7 days following surgery, after which you may continue to shower.  o No submersion of the incision, bathing or swimming for 14 days following surgery or until cleared by Dr Bill Marti.    Can I remove this dressing?  o Yes, this is removed just like removing a band aid.  o If you are concerned, this can be removed by your therapist.   Jennifer Pastrana What do I do with the dressing when I shower?  o The Optifoam dressing is waterproof and you may shower with it.   o The incision is sealed with Dermabond, a biologic skin glue, which also serves as a watertight seal. If your incision is draining, it is no longer considered to be watertight - you should contact our office prior to showering if you experience any drainage.  Which dressing should I purchase after I remove my Optifoam?  o An occlusive dressing which covers your entire incision. This does not have to be waterproof, but will need to be removed when you shower and then replaced. (Example Only)   How active should I be following surgery? o Progress activities in moderation and at your own pace.   o Walking room to room in your house is encouraged. o Walk each day and set progressive goals with small increments (1st week - ?block of walking, 2nd week - 1 block, 3rd week - 2 blocks, etc.)   Will I need help at home?  o You will likely need a caretaker who should be available for the first week following surgery. It is fine for family members to work during the day, as long as they are available by phone. o Planning ahead makes coming home from the hospital a much easier transition.  How long will my surgery take?  o On average, total joint replacement takes approximately 1-2 hour.   o The entire process, including pre-op and post-op care can last as long as 4- 5 hours before you are transferred to your room. o stroke, pulmonary embolism (a clot going from the legs to the lungs), and even death with surgery.  Will I be given antibiotics? Will I need antibiotics at discharge?  o Antibiotics will be given to you both before and after your procedure.   To further minimize the risk of infection, we have streamlined the surgical procedure to take less time in the operating room.    o You do not require antibiotics following surgery.       Please do not hesitate to contact me through [employer] or by text / call me at [phone] (cell phone) for questions following surgery - MD Jamaal Correa MD  Cell [phone]  Stoney John PA-C  Cell [phone]  Medical Assistants: Osman Zeng [phone]

## 2021-12-01 NOTE — PROGRESS NOTES
12/1/2021     2:05 PM   Revised HH noted. CM submitted to [facility], and they accepted for PT and SN as ordered. 11:16 AM  Care Management Progress Note      ICD-10-CM ICD-9-CM    1. Primary osteoarthritis of right knee  M17.11 715.16 oxyCODONE IR (ROXICODONE) 5 mg immediate release tablet      traMADoL (ULTRAM) 50 mg tablet   2. Retained orthopedic hardware  Z96.9 V45.89 oxyCODONE IR (ROXICODONE) 5 mg immediate release tablet      traMADoL (ULTRAM) 50 mg tablet       RUR:  NA - OBS  Risk Level: [x]Low []Moderate []High  Value-based purchasing: [] Yes [x] No  Bundle patient: [] Yes [x] No   Specify:     Transition of care plan:  1. DC pending PT/OT clearance. 2. Home with  - CM notified pt had [facility] whom pt prefers in the past, not [facility]. CM completed referral via [employer] to [facility], and they accepted. [facility] will submit auth. CM consult for RW noted. CM completed referral via [employer] to [facility], and is awaiting response. [place] will submit auth. CM received acceptance from [facility] for RW. RW delivered, and invoice attached in AllScripts. 3. Outpatient follow-up. 4. Pt's family to transport. Care Management Interventions  PCP Verified by CM:  Yes Alison Jarrell)  Mode of Transport at Discharge: Self  Transition of Care Consult (CM Consult): Discharge Planning  MyChart Signup: No (RW)  Discharge Durable Medical Equipment: Yes  Physical Therapy Consult: Yes  Occupational Therapy Consult: Yes  Speech Therapy Consult: No  Support Systems: Spouse/Significant Other  The Plan for Transition of Care is Related to the Following Treatment Goals : Ortho  The Patient and/or Patient Representative was Provided with a Choice of Provider and Agrees with the Discharge Plan?: (S) Yes  Name of the Patient Representative Who was Provided with a Choice of Provider and Agrees with the Discharge Plan: Self  Freedom of Choice List was Provided with Basic Dialogue that Supports the Patient's Individualized Plan of Care/Goals, Treatment Preferences and Shares the Quality Data Associated with the Providers?: (S) Yes  Discharge Location  Discharge Placement: Home with home health

## 2021-12-01 NOTE — PROGRESS NOTES
TOTAL KNEE REVISION ARTHROPLASTY DAILY NOTE     ASSESSMENT / PLAN :   1. Pain Control : Excellent - Able to sleep and participate with therapy  2. Overnight Issues : none  3. Wound or incisional issue : Healing incision with no visible drainage, ace wrap intact. 4. Therapy / Weight Bearing Recommendations : Weight bear as tolerated with use of a walker and two person assist while mobilizing  5. DVT Prophylaxis : Mechanical and Lovenox -> Coumadin once her INR is therapeutic and mechanical lower extremity compression device  6. Disposition : Discharge to home with home health (maximum of 4 visits)  7. Medical Concerns : Stable, MMP  8. Comments : Stable and doing well with therapy. POD  2 Days Post-Op s/p Procedure(s):  REMOVAL OF HARDWARE AND RIGHT DISTAL FEMORAL REPLACEMENT (MAKOPLASTY) (GENERAL WITH FEMORAL NERVE BLOCK AND SCIATIC NERVE BLOCK)     SUBJECTIVE :     Patient notes the following issues : none. OBJECTIVE :     Vitals:    11/30/21 1544 11/30/21 1941 11/30/21 2312 12/01/21 0252   BP: 116/71 108/67 119/69 110/67   Pulse: 75 87 90 100   Resp: 18 18 18 18   Temp: 97.9 °F (36.6 °C) 98.6 °F (37 °C) 98.6 °F (37 °C) 99.3 °F (37.4 °C)   SpO2: 97% 94% 92% 92%   Weight:       Height:               Alert and oriented x3. Examination of the right knee reveals that the dressing is clean, dry and intact. Motor Exam is intact and normal. Pt is able to perform a straight leg raise. Sensation is intact to light touch. No calf pain. Labs:  Recent Labs     12/01/21  0019 11/30/21  0434 11/30/21  0156 11/30/21  0156   HGB  --  8.5*  --   --    INR 1.2*  --    < > 1.1   NA  --   --   --  140   K  --   --   --  4.2   CL  --   --   --  110*   CO2  --   --   --  20*   BUN  --   --   --  17   CREA  --   --   --  0.85   GLU  --   --   --  135*    < > = values in this interval not displayed.        CULTURES :  No results found for: SDES  Lab Results   Component Value Date/Time    Culture result: MRSA NOT PRESENT 11/15/2021 02:14 PM    Culture result:  11/15/2021 02:14 PM     Screening of patient nares for MRSA is for surveillance purposes and, if positive, to facilitate isolation considerations in high risk settings. It is not intended for automatic decolonization interventions per se as regimens are not sufficiently effective to warrant routine use.     Culture result: No growth (<1,000 CFU/ML) 11/15/2021 02:14 PM        Patient mobility  Gait  Base of Support: Shift to left  Gait Abnormalities: Antalgic, Decreased step clearance  Ambulation - Level of Assistance: Contact guard assistance  Distance (ft): 50 Feet (ft)  Assistive Device: Walker, rolling, Gait belt        MICROBIOLOGY :   All Micro Results     None            Armen Cruz MD  Cell [phone]  Jovanni Ramos PA-C  Cell [phone]  Office : [phone]  Medical Assistant Staff:  Rafaela Pablo [phone]                 Surgery Scheduler: Jacklyn Choi, [address] [place] [address]

## 2021-12-01 NOTE — PROGRESS NOTES
Problem: Mobility Impaired (Adult and Pediatric)  Goal: *Acute Goals and Plan of Care (Insert Text)  Description: FUNCTIONAL STATUS PRIOR TO ADMISSION: Patient was modified independent using a rolling walker and wheelchair for functional mobility. HOME SUPPORT PRIOR TO ADMISSION: The patient lived with  but did not require assist.    Physical Therapy Goals  Initiated 11/30/2021    1. Patient will move from supine to sit and sit to supine  in bed with modified independence within 4 days. 2. Patient will perform sit to stand with modified independence within 4 days. 3. Patient will ambulate with modified independence for 75 feet with the least restrictive device within 4 days. 5. Patient will perform home exercise program per protocol with independence within 4 days. 6. Patient will demonstrate AROM 0-90 degrees in operative joint within 4 days. Outcome: Progressing Towards Goal  Note:   PHYSICAL THERAPY TREATMENT  Patient: Fanny Easley (76 y.o. female)  Date: 12/1/2021  Diagnosis: [facility] [S72.001A]  Primary osteoarthritis of right knee [M17.11]  Retained orthopedic hardware [Z96.9]   <principal problem not specified>  Procedure(s) (LRB):  REMOVAL OF HARDWARE AND RIGHT DISTAL FEMORAL REPLACEMENT (MAKOPLASTY) (GENERAL WITH FEMORAL NERVE BLOCK AND SCIATIC NERVE BLOCK) (Right) 2 Days Post-Op  Precautions:    Chart, physical therapy assessment, plan of care and goals were reviewed. ASSESSMENT  Patient continues with skilled PT services and is progressing towards goals. Increased time for functional tranfers and gait training using RW for steadying, occasional verbal cues for WB on RLE, after having WB restrictions for several months. Overall decresed endurance requiring two standing rest breaks with gait training. No knee in stability noted. Spouse present and supportive throughout session. Pt has ramp and w/c to enter home.  Will need RW prior to discharge. Pt is cleared for discharge from PT standpoint. Current Level of Function Impacting Discharge (mobility/balance): CGA    Other factors to consider for discharge:          PLAN :  Patient continues to benefit from skilled intervention to address the above impairments. Continue treatment per established plan of care. to address goals. Recommendation for discharge: (in order for the patient to meet his/her long term goals)  Physical therapy at least 2 days/week in the home     This discharge recommendation:  Has been made in collaboration with the attending provider and/or case management    IF patient discharges home will need the following DME: rolling walker       SUBJECTIVE:   Patient stated i am not used to putting wt on my leg.     OBJECTIVE DATA SUMMARY:   Critical Behavior:  Neurologic State: Alert  Orientation Level: Oriented X4  Cognition: Follows commands  Safety/Judgement: Awareness of environment, Fall prevention, Good awareness of safety precautions, Home safety, Insight into deficits    Range of Motion:                            Functional Mobility Training:  Bed Mobility:     Supine to Sit: Contact guard assistance; Additional time; Assist x1     Scooting: Stand-by assistance; Additional time; Assist x1        Transfers:  Sit to Stand: Contact guard assistance  Stand to Sit: Contact guard assistance                             Balance:  Sitting: Intact  Standing: With support  Standing - Static: Constant support; Good  Standing - Dynamic : Constant support; Good  Ambulation/Gait Training:  Distance (ft): 50 Feet (ft)  Assistive Device: Walker, rolling; Gait belt  Ambulation - Level of Assistance: Contact guard assistance        Gait Abnormalities: Antalgic; Decreased step clearance; Step to gait                                      Stairs:               Therapeutic Exercises:     EXERCISE   Sets   Reps   Active Active Assist   Passive Self ROM   Comments   Ankle Pumps   [x] []                                        []                                        []                                           Quad Sets   [x]                                        []                                        []                                        []                                           Hamstring Sets   []                                        []                                        []                                        []                                           Short Arc Quads   []                                        []                                        []                                        []                                           Knee Extension Stretch     []                                          []                                          [x]                                          []                                           Heel Slides   [x]                                        []                                        []                                        []                                        30 degrees   Long Arc Quads   []                                        []                                        []                                        []                                           Knee Flexion Stretch   []                                        []                                        []                                        []                                           Straight Leg Raises   []                                        [x]                                        []                                        []                                               Pain Rating:  3/10    Activity Tolerance:   Good    After treatment patient left in no apparent distress:   Sitting in chair, Call bell within reach, and Caregiver / family present    COMMUNICATION/COLLABORATION:   The patients plan of care was discussed with: Registered nurse.      Gardenia Bella   Time Calculation: 31 mins

## 2021-12-01 NOTE — PROGRESS NOTES
Problem: Self Care Deficits Care Plan (Adult)  Goal: *Acute Goals and Plan of Care (Insert Text)  Description:   FUNCTIONAL STATUS PRIOR TO ADMISSION: Patient was modified independent using a wheelchair and pia-walker for functional mobility due to recent R femur and R shoulder fx from [facility]  on 5/29/2021; pt has only been mobilizing 15 ft per MD order. Patient was modified independent for basic and instrumental ADLs and utilized shower bench for bathing. HOME SUPPORT: The patient lived with  but did not require assist.    Occupational Therapy Goals  Initiated 11/30/2021    1. Patient will perform lower body dressing at modified independence using AE PRN within 7 days. 2. Patient will perform toilet transfers at modified independence using Walkers, Type: Rolling Walker within 7 days. 3. Patient will perform all aspects of toileting at modified independence within 7 days. 4. Patient will tolerate greater than 5 minutes of standing without a rest break at modified independence using Walkers, Type: Rolling Walker during ADL's within 7 days. Outcome: Progressing Towards Goal   OCCUPATIONAL THERAPY TREATMENT  Patient: Mikal Bass (35 y.o. female)  Date: 12/1/2021  Diagnosis: Closed fracture of right hip, initial encounter ([facility] [S72.001A]  Primary osteoarthritis of right knee [M17.11]  Retained orthopedic hardware [Z96.9]   <principal problem not specified>  Procedure(s) (LRB):  REMOVAL OF HARDWARE AND RIGHT DISTAL FEMORAL REPLACEMENT (MAKOPLASTY) (GENERAL WITH FEMORAL NERVE BLOCK AND SCIATIC NERVE BLOCK) (Right) 2 Days Post-Op  Precautions:    Chart, occupational therapy assessment, plan of care, and goals were reviewed. ASSESSMENT  Patient continues with skilled OT services and is progressing towards goals. Pt seated in chair and willing to work on [employer] dress with use of AE. She stated having one at home that she has been using since May. She has other AE at home as well.  Pt transfers back to bed contact guard. She is clear from an OT standpoint once medically clear. Current Level of Function Impacting Discharge (ADLs): dons pants with use of reacher, contact guard ADL related mobility    Other factors to consider for discharge:          PLAN :  Patient continues to benefit from skilled intervention to address the above impairments. Continue treatment per established plan of care to address goals. Recommend with staff: out of bed to chair for ADl's, there ex, there act, meals    Recommend next OT session: pt has cleared OT     Recommendation for discharge: (in order for the patient to meet his/her long term goals)  Occupational therapy at least 2 days/week in the home     This discharge recommendation:  Has been made in collaboration with the attending provider and/or case management    IF patient discharges home will need the following DME:        SUBJECTIVE:   Patient stated I have been doing this since May    OBJECTIVE DATA SUMMARY:   Cognitive/Behavioral Status:  Neurologic State: Alert  Orientation Level: Oriented X4  Cognition: Follows commands             Functional Mobility and Transfers for ADLs:  Bed Mobility:  Supine to Sit: Contact guard assistance; Additional time; Assist x1  Scooting: Stand-by assistance;  Additional time; Assist x1    Transfers:  Sit to Stand: Contact guard assistance          Balance:  Sitting: Intact  Standing: With support  Standing - Static: Constant support; Good  Standing - Dynamic : Constant support; Good    ADL Intervention:     Lower Body Dressing Assistance  Pants With Elastic Waist: Contact guard assistance  Leg Crossed Method Used: No  Position Performed: Seated in chair  Adaptive Equipment Used: Reacher         Activity Tolerance:   Fair    After treatment patient left in no apparent distress:   Supine in bed and Call bell within reach    COMMUNICATION/COLLABORATION:   The patients plan of care was discussed with: Occupational therapistSharron Barney JESSE Clark/L  Time Calculation: 20 mins

## 2021-12-01 NOTE — PROGRESS NOTES
Problem: Falls - Risk of  Goal: *Absence of Falls  Description: Document Bard  Fall Risk and appropriate interventions in the flowsheet.   Outcome: Progressing Towards Goal  Note: Fall Risk Interventions:  Mobility Interventions: Communicate number of staff needed for ambulation/transfer         Medication Interventions: Evaluate medications/consider consulting pharmacy    Elimination Interventions: Call light in reach, Patient to call for help with toileting needs    History of Falls Interventions: Evaluate medications/consider consulting pharmacy         Problem: Hypertension  Goal: *Blood pressure within specified parameters  Outcome: Progressing Towards Goal  Goal: *Fluid volume balance  Outcome: Progressing Towards Goal

## 2021-12-01 NOTE — ROUTINE PROCESS
If you experience chest pain call 911. Do not drive yourself. Went over all discharge paperwork. Answered all questions.

## 2021-12-03 ENCOUNTER — TELEPHONE (OUTPATIENT)
Dept: SURGERY | Age: 66
End: 2021-12-03

## 2021-12-03 NOTE — TELEPHONE ENCOUNTER
Post Discharge Phone placed to patient after Joint Replacement surgery   Spoke with patient [place] nurse there today  Patient is taking tylenol and states pain is tolerable and pain medication is effective.    Pharmacy was out of the lovenox but her  has gone to pick it up now    States discharge instructions were clear and easy to understand has no questions  Patient is using a walker without difficulty, moving every hour  Able to do exercises regularly  Bruising is minimal  Swelling is minimal   Patient is elevating leg and using ice with good relief  States dressing is intact without drainage  Denies N/V, appetite is good  Constipation is none, +BM  Follow up appointment is scheduled with surgeon   PT is scheduled at home  Denies fever, cough, chest pain, back pain, SOB  Denies any unusual symptoms  Discussed calling surgeon or PCP for concerns  Reminded patient to fill out survey  Opportunity given for patient to ask questions  Call duration 4:05 minutes  Perfect serve sent to Dr. Dedrick Stinson r/t no lovenox since discharge

## 2022-03-18 PROBLEM — I10 HTN (HYPERTENSION): Status: ACTIVE | Noted: 2021-05-28

## 2022-03-18 PROBLEM — S42.309A HUMERUS FRACTURE: Status: ACTIVE | Noted: 2021-05-28

## 2022-03-18 PROBLEM — E78.5 HLD (HYPERLIPIDEMIA): Status: ACTIVE | Noted: 2021-05-28

## 2022-03-19 PROBLEM — M17.11 PRIMARY OSTEOARTHRITIS OF RIGHT KNEE: Status: ACTIVE | Noted: 2021-11-29

## 2022-03-19 PROBLEM — R79.89 ELEVATED LFTS: Status: ACTIVE | Noted: 2021-05-28

## 2022-03-19 PROBLEM — G40.909 SEIZURE DISORDER (HCC): Status: ACTIVE | Noted: 2021-05-28

## 2022-03-19 PROBLEM — Z96.9 RETAINED ORTHOPEDIC HARDWARE: Status: ACTIVE | Noted: 2021-11-29

## 2023-05-15 RX ORDER — FLUTICASONE PROPIONATE AND SALMETEROL 250; 50 UG/1; UG/1
1 POWDER RESPIRATORY (INHALATION) EVERY 12 HOURS
COMMUNITY

## 2023-05-15 RX ORDER — ATORVASTATIN CALCIUM 20 MG/1
20 TABLET, FILM COATED ORAL NIGHTLY
COMMUNITY

## 2023-05-15 RX ORDER — LISINOPRIL AND HYDROCHLOROTHIAZIDE 20; 12.5 MG/1; MG/1
2 TABLET ORAL NIGHTLY
COMMUNITY

## 2023-05-15 RX ORDER — METOPROLOL TARTRATE 100 MG/1
100 TABLET ORAL 2 TIMES DAILY
COMMUNITY
Start: 2021-06-05

## 2023-05-15 RX ORDER — ONDANSETRON 8 MG/1
4 TABLET, ORALLY DISINTEGRATING ORAL EVERY 8 HOURS PRN
COMMUNITY
Start: 2021-11-29

## 2023-05-15 RX ORDER — ALBUTEROL SULFATE 90 UG/1
2 AEROSOL, METERED RESPIRATORY (INHALATION) EVERY 6 HOURS PRN
COMMUNITY

## 2023-05-15 RX ORDER — PHENOBARBITAL 32.4 MG/1
129.6 TABLET ORAL
COMMUNITY

## 2023-05-15 RX ORDER — IBUPROFEN 800 MG/1
800 TABLET ORAL EVERY 6 HOURS PRN
COMMUNITY
Start: 2021-11-29

## 2023-05-15 RX ORDER — WARFARIN SODIUM 6 MG/1
9 TABLET ORAL
COMMUNITY

## 2023-05-15 RX ORDER — ACETAMINOPHEN 500 MG
1000 TABLET ORAL EVERY 6 HOURS PRN
COMMUNITY
Start: 2021-11-29

## 2023-05-15 RX ORDER — FAMOTIDINE 20 MG/1
20 TABLET, FILM COATED ORAL DAILY PRN
COMMUNITY

## (undated) DEVICE — CONTAINER,SPECIMEN,3OZ,OR STRL: Brand: MEDLINE

## (undated) DEVICE — STERILE POLYISOPRENE POWDER-FREE SURGICAL GLOVES: Brand: PROTEXIS

## (undated) DEVICE — GLOVE SURG SZ 9 L12IN FNGR THK79MIL GRN LTX FREE

## (undated) DEVICE — SUTURE MCRYL SZ 3-0 L27IN ABSRB UD L24MM PS-1 3/8 CIR PRIM Y936H

## (undated) DEVICE — SPONGE LAP 18X18IN STRL -- 5/PK

## (undated) DEVICE — SUTURE VCRL + SZ 1-0 L36IN ABSRB UD CTX 1/2 CIR TAPR PNT VCP977H

## (undated) DEVICE — BANDAGE COMPR M W6INXL10YD WHT BGE VELC E MTRX HK AND LOOP

## (undated) DEVICE — BONE PREPARATION KIT: Brand: BIOPREP

## (undated) DEVICE — GOWN,PREVENTION PLUS,XLN/XL,ST,24/CS: Brand: MEDLINE

## (undated) DEVICE — 1010 S-DRAPE TOWEL DRAPE 10/BX: Brand: STERI-DRAPE™

## (undated) DEVICE — Device: Brand: JELCO

## (undated) DEVICE — INFECTION CONTROL KIT SYS

## (undated) DEVICE — PADDING CST 6IN STERILE --

## (undated) DEVICE — GUIDEWIRE ORTH DIA2.5MM LOK SMOOTH DRL TIP FLX THRD FOR

## (undated) DEVICE — PREP KIT PEEL PTCH POVIDONE IOD

## (undated) DEVICE — DERMABOND SKIN ADH 0.7ML -- DERMABOND ADVANCED 12/BX

## (undated) DEVICE — REM POLYHESIVE ADULT PATIENT RETURN ELECTRODE: Brand: VALLEYLAB

## (undated) DEVICE — COVER LT HNDL PLAS RIG 1 PER PK

## (undated) DEVICE — SPONGE GZ W4XL4IN COT 12 PLY TYP VII WVN C FLD DSGN

## (undated) DEVICE — T4 HOOD

## (undated) DEVICE — Device

## (undated) DEVICE — ZIMMER® STERILE DISPOSABLE TOURNIQUET CUFF WITH PROTECTIVE SLEEVE AND PLC, DUAL PORT, SINGLE BLADDER, 34 IN. (86 CM)

## (undated) DEVICE — GLOVE SURG SZ 85 L12IN FNGR THK79MIL GRN LTX FREE

## (undated) DEVICE — BIT DRL L145MM DIA3.2MM QUIK CPL W/O STP REUSE

## (undated) DEVICE — BRUSH SCRB 4% CHG RED DISP --

## (undated) DEVICE — DUAL IRRIGATION ADAPTOR

## (undated) DEVICE — SOLUTION IRRIG 3000ML 0.9% SOD CHL USP UROMATIC PLAS CONT

## (undated) DEVICE — STRAP,POSITIONING,KNEE/BODY,FOAM,4X60": Brand: MEDLINE

## (undated) DEVICE — PREP SKN CHLRAPRP APL 26ML STR --

## (undated) DEVICE — GLOVE SURG SZ 85 L12IN FNGR ORTHO 126MIL CRM LTX FREE

## (undated) DEVICE — YANKAUER,BULB TIP,W/O VENT,RIGID,STERILE: Brand: MEDLINE

## (undated) DEVICE — 4-PORT MANIFOLD: Brand: NEPTUNE 2

## (undated) DEVICE — BANDAGE COMPR W6INXL10YD ST M E WHITE/BEIGE

## (undated) DEVICE — SYR LR LCK 1ML GRAD NSAF 30ML --

## (undated) DEVICE — SOL IRR SOD CL 0.9% 1000ML BTL --

## (undated) DEVICE — DRAPE,REIN 53X77,STERILE: Brand: MEDLINE

## (undated) DEVICE — TOTAL JOINT-SFMC: Brand: MEDLINE INDUSTRIES, INC.

## (undated) DEVICE — NDL PRT INJ NSAF BLNT 18GX1.5 --

## (undated) DEVICE — SUTURE VCRL SZ 2-0 L36IN ABSRB UD L36MM CT-1 1/2 CIR J945H

## (undated) DEVICE — SMOKE EVACUATION PENCIL: Brand: VALLEYLAB

## (undated) DEVICE — CEMENT MIXING SYSTEM WITH FEMORAL BREAKWAY NOZZLE: Brand: REVOLUTION

## (undated) DEVICE — HANDPIECE SET WITH COAXIAL HIGH FLOW TIP AND SUCTION TUBE: Brand: INTERPULSE

## (undated) DEVICE — ELECTRODE BLDE L4IN NONINSULATED EDGE

## (undated) DEVICE — STRYKER PERFORMANCE SERIES SAGITTAL BLADE: Brand: STRYKER PERFORMANCE SERIES

## (undated) DEVICE — DRAPE C-ARMOUR C-ARM KIT --

## (undated) DEVICE — CANISTER, RIGID, 3000CC: Brand: MEDLINE INDUSTRIES, INC.

## (undated) DEVICE — BIT DRL L300MM DIA4.3MM QUIK CPL PERC CALIB W/O STP REUSE

## (undated) DEVICE — GOWN,SURGICAL,AURORA,SLEEVE: Brand: MEDLINE

## (undated) DEVICE — SUTURE STRATAFIX SPRL SZ 1 L14IN ABSRB VLT L48CM CTX 1/2 SXPD2B405

## (undated) DEVICE — DRAPE,EXTREMITY,89X128,STERILE: Brand: MEDLINE

## (undated) DEVICE — 6619 2 PTNT ISO SYS INCISE AREA&LT;(&GT;&&LT;)&GT;P: Brand: STERI-DRAPE™ IOBAN™ 2

## (undated) DEVICE — SUTURE VCRL SZ 2-0 L36IN ABSRB UD L40MM CT 1/2 CIR J957H